# Patient Record
Sex: MALE | Race: WHITE | NOT HISPANIC OR LATINO | Employment: OTHER | URBAN - METROPOLITAN AREA
[De-identification: names, ages, dates, MRNs, and addresses within clinical notes are randomized per-mention and may not be internally consistent; named-entity substitution may affect disease eponyms.]

---

## 2023-06-01 ENCOUNTER — TELEPHONE (OUTPATIENT)
Dept: TRANSPLANT | Facility: CLINIC | Age: 58
End: 2023-06-01

## 2023-06-01 ENCOUNTER — TELEPHONE (OUTPATIENT)
Dept: TRANSPLANT | Facility: CLINIC | Age: 58
End: 2023-06-01
Payer: COMMERCIAL

## 2023-06-01 NOTE — LETTER
June 1, 2023    Grupo Gallegos  [address]  [address]  [place] [address]  Phone: [phone]  Fax: [phone]             Dear Dr. Grupo Gallegos:    Patient: Cornelio Rivers   MR Number: [id number]   YOB: 1965     Thank you for the referral of Cornelio Rivers to our transplant program. Your patients demographic and insurance information has been forwarded to our financial counselor for insurance clearance.  Once the insurance company has approved a transplant evaluation for your patient he will be contacted by the transplant coordinator.  An initial appointment will then be scheduled for your patient with one of our transplant hepatologists.      Thank you again for your trust in our program.  If there is anything we can do for you or your staff, please feel free to contact us.      Sincerely,        [facility]   [address] [place] [address]  [phone]

## 2023-06-01 NOTE — TELEPHONE ENCOUNTER
----- Message from Jatinder Rivers sent at 6/1/2023  1:03 PM CDT -----    Hepatology referral received and scanned into media; pt chart sent to referral nurse for medical review.       Referring Provider: Grupo Gallegos MD   Phone: [phone]   Fax: [phone]        .

## 2023-06-01 NOTE — LETTER
June 1, 2023    Cornelio Rivers  [address]  [place] [address]          Dear Cornelio Rivers:    Your doctor has referred you to the [facility] for liver transplant consideration.  Your demographic and insurance information have been forwarded to our financial counselor for insurance clearance.  You will be contacted by our office once your insurance company has approved a transplant consult and/or evaluation for you. An initial appointment will then be scheduled for you.     We look forward to seeing you soon. If you have any further questions, please contact us at [phone].       Sincerely,        [facility]   [address] [place] [address]  [phone]

## 2023-06-01 NOTE — TELEPHONE ENCOUNTER
"----- Message from Jatinder Rivers sent at 6/1/2023  1:47 PM CDT -----    Called pt x2 at [phone] (Mobile), but the VM belongs to a woman named "Charles." [facility] for him to call back to [facility] appt. Called pt at [phone], but that a company number for "[employer]" stating to call [phone], but there was no answer.     Called ref Md office at [phone] and [facility] for them to call back with better contact info for the pt.  .      "

## 2023-06-01 NOTE — TELEPHONE ENCOUNTER
Referral received from Dr Grupo Gallegos  Patient with Alcoholic cirrhosis. MELD 29  ICD-10: K74.60  Referred for liver transplant for EVALUATION.    Referral completed and forwarded to Transplant Financial Services.        Insurance:

## 2023-06-02 ENCOUNTER — TELEPHONE (OUTPATIENT)
Dept: TRANSPLANT | Facility: CLINIC | Age: 58
End: 2023-06-02
Payer: COMMERCIAL

## 2023-06-02 NOTE — TELEPHONE ENCOUNTER
----- Message from Jatinder Rivers sent at 6/2/2023  8:21 AM CDT -----  Regarding: FW: Appointment    Returned call to pt wife to get pt shaun an appt for next week, but she wanted to what a few weeks. She told me that the pt was d/c'ed form the [facility] at the end of April and was fine, but the last few weeks the pt has been very weak; low BP and sleeping more and hard to rouse. Told her that I will have a nurse give her a call back.    .  ----- Message -----  From: Princess Aquino  Sent: 6/2/2023   6:03 AM CDT  To: Jatinder Rivers  Subject: FW: Appointment                                    ----- Message -----  From: Amanda Sagastume  Sent: 6/1/2023   5:00 PM CDT  To: [facility] Non-Clinical  Subject: Appointment                                      Pt's wife is returning a call to Jatinder. In regards to scheduling an appt for the pt.      Rogers  (Wife) @ [phone]

## 2023-06-02 NOTE — TELEPHONE ENCOUNTER
Informed per  who spoke with patient's wife that patient is difficult to arouse and has low BP. Called and spoke with wife. She states he sleeps a lot and his BP is low. Advised wife to bring patient to ER and notify Dr. Gallegos's office. States patient is taking Lactulose and Rifaximin. Advised that patient sounds encephalopathic and letting him sleep is not going help him get better. He could go into a coma. Needs to be seen and assessed urgently.

## 2023-06-09 ENCOUNTER — TELEPHONE (OUTPATIENT)
Dept: TRANSPLANT | Facility: CLINIC | Age: 58
End: 2023-06-09
Payer: COMMERCIAL

## 2023-06-09 NOTE — TELEPHONE ENCOUNTER
Patient called to let him know that he was cleared for transplant evaluation and consult appointment.  There was no answer message left for the patient to call the office.

## 2023-06-09 NOTE — TELEPHONE ENCOUNTER
Patient is currently inpatient at [facility] for elevated ammonia level, pneumonia and sepsis.  Patient is no longer in  ICU.  Patient still has an elevated ammonia  level and is currently being treated to bring it down to normal level..   Patient remains extremely lethargic according to his wife.    Patient's wife instructed to call the office when he is discharged so the patient can be scheduled urgently in the clinic.

## 2023-06-12 ENCOUNTER — TELEPHONE (OUTPATIENT)
Dept: HEPATOLOGY | Facility: CLINIC | Age: 58
End: 2023-06-12
Payer: COMMERCIAL

## 2023-06-13 NOTE — TELEPHONE ENCOUNTER
Dr. Morrison called from ER via transfer center about this 57 yo alc hep pt, quit drinking 3 mon ago because he got too sick, slow recovery. Had appt last wk for transplant eval. INR 2.5, T. bili 13, mildly HE, creat 0.5, Na 142.  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     MELD 28 or 29.     May be a transplant candidate.

## 2023-06-13 NOTE — PROVIDER TRANSFER
(Physician in Lead of Transfers)  Outside Transfer Acceptance Note / Regional Referral Center    Upon patient arrival to floor, please send SecureChat to [facility] or call extension [phone] (if no answer, do NOT leave a callback number after the beep, rather please send a SecureChat to [facility]), for Hospital Medicine admit team assignment and for additional admit orders for the patient.  Do not page the attending physician associated with the patient on arrival (this physician may not be on duty at the time of arrival).  Rather, always send a SecureChat to [facility] or call [phone] to reach the triage physician for orders and team assignment.    Referring facility: [facility]   Referring provider: BLAS AGUDELO  Accepting facility: [facility]  Accepting provider: LURDES CASTILLO  Reason for transfer: Higher Level of Care   Transfer diagnosis: decompensated cirrhosis  Transfer specialty requested: Hepatology  Transfer specialty notified: Yes  Transfer level: NUMBER 1-5: 2  Bed type requested: stepdown  Isolation status: No active isolations   Admission class or status: IP- Inpatient      Narrative     58-year-old male with a history of cirrhosis with ascites and varices, SBP, alcohol use, hyperlipidemia, thrombocytopenia   admitted to [facility] on June 2 with altered mental status, increasing weakness, poor appetite, and possible pneumonia.  He had ammonia level 102 and a MELD score 32 (sodium 118, INR 2.2, total bilirubin 16.3, creatinine 1.02) chest x-ray on admission had mild interstitial edema versus pneumonitis with findings suggestive of developing airspace disease or atelectasis in the right chest.  Initially he had hypotension with concern for septic shock and was treated in the ICU with pressors.  He was treated with broad-spectrum antibiotics.  Hemoglobin decreased during his stay and he received packed red blood cells, but he did not  have signs of GI bleeding.  INR increased during his stay and he received vitamin K/FFP.  He gradually weaned off pressors and was transferred out of the ICU on June 8.  Mental status has not improved, and he remains intermittently lethargic.  Bilirubin was stable to slightly improved, but INR has worsened.  He was empirically started on Rocephin for SBP prophylaxis (no paracentesis with elevated INR//blood cultures with no growth so far).  He has persistent abdominal distention.  Current medications include Xifaxan and lactulose, Protonix, midodrine, ceftriaxone and Lasix/Aldactone.  Last alcohol use was noted to be about 2 months ago. Current MELD score 29.  Referring team spoke with Hepatology at [facility].  Requesting transfer to Hospital Medicine at [facility] for further treatment of decompensated cirrhosis.  Referring provider noted that the patient is stable.  He remains somnolent but will awake and answer questions.  He is able to tolerate oral medications.  He is not on any continuous IV infusions by report.    Current diagnoses include hepatic encephalopathy, alcoholic hepatitis, coagulopathy, and anemia.    June 13: Sodium 141, potassium 3.4, chloride 105 CO2 30, BUN 11, creatinine 0.51, glucose 103, total bilirubin 13.8, , ALT 75, INR 3, white blood cells 8.3, hemoglobin 8.4, hematocrit 25.5, platelets 89    June 12: Sodium 142, potassium 3, chloride 106, CO2 29, BUN 10, creatinine 0.59, glucose 125, calcium 9.5, magnesium 1.64, bilirubin 13.5, , ALT 78, white blood cells 7.4, hemoglobin 8.3, hematocrit 25.5, platelets 109, INR 2.9    June 9:  Right upper extremity Doppler venous ultrasound had no DVT  -chest x-ray had stable patchy bilateral pulmonary infiltrates.    June 2: COVID negative, influenza negative  -gallbladder ultrasound noted moderate volume ascites.  Thick-walled gallbladder seen, nonspecific.  Internal gallbladder sludge.  Chest x-ray had mild interstitial  edema or pneumonitis with findings suggestive of developing airspace disease or atelectasis in the right chest.  -CT head had no evidence of acute intracranial hemorrhage.  Some microvascular disease is present.    February 16, 2023: EGD had no significant recurrence of varices in the esophagus.  Moderate portal hypertensive gastropathy with friable mucosa.    Objective     Vitals:  Temperature 98.9°, pulse 81, blood pressure 100/62, O2 sats 96%    Instructions    Admit to Hospital Medicine  Consult Hepatology      JATINDER Hardy MD  Hospital Medicine Staff  Cell: [phone]

## 2023-06-15 ENCOUNTER — HOSPITAL ENCOUNTER (INPATIENT)
Facility: HOSPITAL | Age: 58
LOS: 13 days | Discharge: HOSPICE | DRG: 432 | End: 2023-06-28
Attending: INTERNAL MEDICINE | Admitting: INTERNAL MEDICINE
Payer: COMMERCIAL

## 2023-06-15 DIAGNOSIS — K72.90 DECOMPENSATED HEPATIC CIRRHOSIS: ICD-10-CM

## 2023-06-15 DIAGNOSIS — R06.02 SOB (SHORTNESS OF BREATH): ICD-10-CM

## 2023-06-15 DIAGNOSIS — K70.31 ALCOHOLIC CIRRHOSIS OF LIVER WITH ASCITES: ICD-10-CM

## 2023-06-15 DIAGNOSIS — Z03.89 RULED OUT FOR MYOCARDIAL INFARCTION: ICD-10-CM

## 2023-06-15 DIAGNOSIS — K74.60 DECOMPENSATED HEPATIC CIRRHOSIS: ICD-10-CM

## 2023-06-15 DIAGNOSIS — R07.9 CHEST PAIN: ICD-10-CM

## 2023-06-15 DIAGNOSIS — K76.82 HEPATIC ENCEPHALOPATHY: Primary | ICD-10-CM

## 2023-06-15 PROBLEM — E87.6 HYPOKALEMIA: Status: ACTIVE | Noted: 2023-06-15

## 2023-06-15 PROBLEM — R41.0 DELIRIUM: Status: ACTIVE | Noted: 2023-06-15

## 2023-06-15 PROBLEM — I85.10 SECONDARY ESOPHAGEAL VARICES WITHOUT BLEEDING: Status: ACTIVE | Noted: 2023-06-15

## 2023-06-15 PROBLEM — R53.81 PHYSICAL DEBILITY: Status: ACTIVE | Noted: 2023-06-15

## 2023-06-15 PROBLEM — E83.42 HYPOMAGNESEMIA: Status: ACTIVE | Noted: 2023-06-15

## 2023-06-15 PROBLEM — K76.6 PORTAL HYPERTENSION: Status: ACTIVE | Noted: 2023-06-15

## 2023-06-15 PROBLEM — D68.9 COAGULOPATHY: Status: ACTIVE | Noted: 2023-06-15

## 2023-06-15 PROBLEM — D69.6 THROMBOCYTOPENIA: Status: ACTIVE | Noted: 2023-06-15

## 2023-06-15 LAB
ABO + RH BLD: NORMAL
AFP SERPL-MCNC: <2 NG/ML (ref 0–8.4)
ALBUMIN FLD-MCNC: <0.4 G/DL
ALBUMIN SERPL BCP-MCNC: 2.4 G/DL (ref 3.5–5.2)
ALP SERPL-CCNC: 210 U/L (ref 55–135)
ALT SERPL W/O P-5'-P-CCNC: 71 U/L (ref 10–44)
AMMONIA PLAS-SCNC: 40 UMOL/L (ref 10–50)
AMPHET+METHAMPHET UR QL: NEGATIVE
ANION GAP SERPL CALC-SCNC: 11 MMOL/L (ref 8–16)
APPEARANCE FLD: CLEAR
AST SERPL-CCNC: 97 U/L (ref 10–40)
AV INDEX (PROSTH): 1.27
AV MEAN GRADIENT: 3 MMHG
AV PEAK GRADIENT: 4 MMHG
AV VALVE AREA: 6.65 CM2
AV VELOCITY RATIO: 1.21
BARBITURATES UR QL SCN>200 NG/ML: NEGATIVE
BASOPHILS # BLD AUTO: 0.06 K/UL (ref 0–0.2)
BASOPHILS NFR BLD: 0.6 % (ref 0–1.9)
BENZODIAZ UR QL SCN>200 NG/ML: NEGATIVE
BILIRUB SERPL-MCNC: 13.5 MG/DL (ref 0.1–1)
BLD GP AB SCN CELLS X3 SERPL QL: NORMAL
BNP SERPL-MCNC: 133 PG/ML (ref 0–99)
BODY FLD TYPE: NORMAL
BODY FLUID SOURCE, LDH: NORMAL
BSA FOR ECHO PROCEDURE: 1.92 M2
BUN SERPL-MCNC: 14 MG/DL (ref 6–20)
BZE UR QL SCN: NEGATIVE
CALCIUM SERPL-MCNC: 10 MG/DL (ref 8.7–10.5)
CANNABINOIDS UR QL SCN: ABNORMAL
CHLORIDE SERPL-SCNC: 100 MMOL/L (ref 95–110)
CO2 SERPL-SCNC: 29 MMOL/L (ref 23–29)
COLOR FLD: YELLOW
CREAT SERPL-MCNC: 0.6 MG/DL (ref 0.5–1.4)
CREAT UR-MCNC: 126 MG/DL (ref 23–375)
CV ECHO LV RWT: 0.31 CM
DIFFERENTIAL METHOD: ABNORMAL
DOP CALC AO PEAK VEL: 1.02 M/S
DOP CALC AO VTI: 19.79 CM
DOP CALC LVOT AREA: 5.2 CM2
DOP CALC LVOT DIAMETER: 2.58 CM
DOP CALC LVOT PEAK VEL: 1.23 M/S
DOP CALC LVOT STROKE VOLUME: 131.68 CM3
DOP CALCLVOT PEAK VEL VTI: 25.2 CM
E WAVE DECELERATION TIME: 218.75 MSEC
E/A RATIO: 1.31
E/E' RATIO: 9.63 M/S
ECHO LV POSTERIOR WALL: 0.84 CM (ref 0.6–1.1)
EJECTION FRACTION: 70 %
EOSINOPHIL # BLD AUTO: 0.2 K/UL (ref 0–0.5)
EOSINOPHIL NFR BLD: 1.9 % (ref 0–8)
ERYTHROCYTE [DISTWIDTH] IN BLOOD BY AUTOMATED COUNT: ABNORMAL % (ref 11.5–14.5)
EST. GFR  (NO RACE VARIABLE): >60 ML/MIN/1.73 M^2
ETHANOL UR-MCNC: <10 MG/DL
FERRITIN SERPL-MCNC: 1840 NG/ML (ref 20–300)
FIBRINOGEN PPP-MCNC: 93 MG/DL (ref 182–400)
FRACTIONAL SHORTENING: 35 % (ref 28–44)
GLUCOSE SERPL-MCNC: 121 MG/DL (ref 70–110)
GRAM STN SPEC: NORMAL
GRAM STN SPEC: NORMAL
HAV IGM SERPL QL IA: NORMAL
HBV CORE IGM SERPL QL IA: NORMAL
HBV SURFACE AG SERPL QL IA: NORMAL
HCT VFR BLD AUTO: 25.5 % (ref 40–54)
HCV AB SERPL QL IA: NORMAL
HGB BLD-MCNC: 8.3 G/DL (ref 14–18)
HIV 1+2 AB+HIV1 P24 AG SERPL QL IA: NORMAL
IMM GRANULOCYTES # BLD AUTO: 0.06 K/UL (ref 0–0.04)
IMM GRANULOCYTES NFR BLD AUTO: 0.6 % (ref 0–0.5)
INR PPP: 2.1 (ref 0.8–1.2)
INTERVENTRICULAR SEPTUM: 0.71 CM (ref 0.6–1.1)
LA MAJOR: 6.37 CM
LA MINOR: 4.97 CM
LA WIDTH: 4.49 CM
LDH FLD L TO P-CCNC: 39 U/L
LEFT ATRIUM SIZE: 3.77 CM
LEFT ATRIUM VOLUME INDEX MOD: 36.2 ML/M2
LEFT ATRIUM VOLUME INDEX: 41.8 ML/M2
LEFT ATRIUM VOLUME MOD: 69.47 CM3
LEFT ATRIUM VOLUME: 80.34 CM3
LEFT INTERNAL DIMENSION IN SYSTOLE: 3.49 CM (ref 2.1–4)
LEFT VENTRICLE DIASTOLIC VOLUME INDEX: 74.07 ML/M2
LEFT VENTRICLE DIASTOLIC VOLUME: 142.22 ML
LEFT VENTRICLE MASS INDEX: 78 G/M2
LEFT VENTRICLE SYSTOLIC VOLUME INDEX: 26.3 ML/M2
LEFT VENTRICLE SYSTOLIC VOLUME: 50.45 ML
LEFT VENTRICULAR INTERNAL DIMENSION IN DIASTOLE: 5.41 CM (ref 3.5–6)
LEFT VENTRICULAR MASS: 149.37 G
LV LATERAL E/E' RATIO: 8.56 M/S
LV SEPTAL E/E' RATIO: 11 M/S
LYMPHOCYTES # BLD AUTO: 1.4 K/UL (ref 1–4.8)
LYMPHOCYTES NFR BLD: 13.8 % (ref 18–48)
LYMPHOCYTES NFR FLD MANUAL: 28 %
MAGNESIUM SERPL-MCNC: 1.5 MG/DL (ref 1.6–2.6)
MCH RBC QN AUTO: 36.2 PG (ref 27–31)
MCHC RBC AUTO-ENTMCNC: 32.5 G/DL (ref 32–36)
MCV RBC AUTO: 111 FL (ref 82–98)
MESOTHL CELL NFR FLD MANUAL: 16 %
METHADONE UR QL SCN>300 NG/ML: NEGATIVE
MONOCYTES # BLD AUTO: 0.9 K/UL (ref 0.3–1)
MONOCYTES NFR BLD: 9 % (ref 4–15)
MONOS+MACROS NFR FLD MANUAL: 47 %
MV A" WAVE DURATION": 10.85 MSEC
MV PEAK A VEL: 0.59 M/S
MV PEAK E VEL: 0.77 M/S
MV STENOSIS PRESSURE HALF TIME: 63.44 MS
MV VALVE AREA P 1/2 METHOD: 3.47 CM2
NEUTROPHILS # BLD AUTO: 7.7 K/UL (ref 1.8–7.7)
NEUTROPHILS NFR BLD: 74.1 % (ref 38–73)
NEUTROPHILS NFR FLD MANUAL: 9 %
NRBC BLD-RTO: 0 /100 WBC
OPIATES UR QL SCN: NEGATIVE
PCP UR QL SCN>25 NG/ML: NEGATIVE
PHOSPHATE SERPL-MCNC: 3.2 MG/DL (ref 2.7–4.5)
PLATELET # BLD AUTO: 133 K/UL (ref 150–450)
PMV BLD AUTO: 9.7 FL (ref 9.2–12.9)
POTASSIUM SERPL-SCNC: 3.2 MMOL/L (ref 3.5–5.1)
PROCALCITONIN SERPL IA-MCNC: 0.11 NG/ML
PROT FLD-MCNC: <1 G/DL
PROT SERPL-MCNC: 6 G/DL (ref 6–8.4)
PROTHROMBIN TIME: 21.6 SEC (ref 9–12.5)
PULM VEIN S/D RATIO: 2.83
PV PEAK D VEL: 0.29 M/S
PV PEAK S VEL: 0.82 M/S
RBC # BLD AUTO: 2.29 M/UL (ref 4.6–6.2)
SINUS: 4.64 CM
SODIUM SERPL-SCNC: 140 MMOL/L (ref 136–145)
SODIUM UR-SCNC: 38 MMOL/L (ref 20–250)
SPECIMEN OUTDATE: NORMAL
SPECIMEN SOURCE: NORMAL
SPECIMEN SOURCE: NORMAL
STJ: 4.55 CM
TDI LATERAL: 0.09 M/S
TDI SEPTAL: 0.07 M/S
TDI: 0.08 M/S
TOXICOLOGY INFORMATION: ABNORMAL
TRICUSPID ANNULAR PLANE SYSTOLIC EXCURSION: 1.65 CM
WBC # BLD AUTO: 10.35 K/UL (ref 3.9–12.7)
WBC # FLD: 25 /CU MM

## 2023-06-15 PROCEDURE — 93010 ELECTROCARDIOGRAM REPORT: CPT | Mod: NTX,,, | Performed by: INTERNAL MEDICINE

## 2023-06-15 PROCEDURE — 83880 ASSAY OF NATRIURETIC PEPTIDE: CPT | Mod: NTX | Performed by: HOSPITALIST

## 2023-06-15 PROCEDURE — 84157 ASSAY OF PROTEIN OTHER: CPT | Mod: NTX | Performed by: HOSPITALIST

## 2023-06-15 PROCEDURE — 36415 COLL VENOUS BLD VENIPUNCTURE: CPT | Mod: NTX | Performed by: HOSPITALIST

## 2023-06-15 PROCEDURE — 85610 PROTHROMBIN TIME: CPT | Mod: NTX | Performed by: HOSPITALIST

## 2023-06-15 PROCEDURE — 82105 ALPHA-FETOPROTEIN SERUM: CPT | Mod: NTX | Performed by: HOSPITALIST

## 2023-06-15 PROCEDURE — 86900 BLOOD TYPING SEROLOGIC ABO: CPT | Mod: NTX | Performed by: HOSPITALIST

## 2023-06-15 PROCEDURE — 83615 LACTATE (LD) (LDH) ENZYME: CPT | Mod: NTX | Performed by: HOSPITALIST

## 2023-06-15 PROCEDURE — 99222 PR INITIAL HOSPITAL CARE,LEVL II: ICD-10-PCS | Mod: NTX,,, | Performed by: INTERNAL MEDICINE

## 2023-06-15 PROCEDURE — 84145 PROCALCITONIN (PCT): CPT | Mod: NTX | Performed by: HOSPITALIST

## 2023-06-15 PROCEDURE — 93010 EKG 12-LEAD: ICD-10-PCS | Mod: NTX,,, | Performed by: INTERNAL MEDICINE

## 2023-06-15 PROCEDURE — 87075 CULTR BACTERIA EXCEPT BLOOD: CPT | Mod: NTX | Performed by: HOSPITALIST

## 2023-06-15 PROCEDURE — 84100 ASSAY OF PHOSPHORUS: CPT | Mod: NTX | Performed by: HOSPITALIST

## 2023-06-15 PROCEDURE — 85384 FIBRINOGEN ACTIVITY: CPT | Mod: NTX | Performed by: HOSPITALIST

## 2023-06-15 PROCEDURE — 99223 PR INITIAL HOSPITAL CARE,LEVL III: ICD-10-PCS | Mod: NTX,,, | Performed by: HOSPITALIST

## 2023-06-15 PROCEDURE — 87205 SMEAR GRAM STAIN: CPT | Mod: NTX | Performed by: HOSPITALIST

## 2023-06-15 PROCEDURE — 82140 ASSAY OF AMMONIA: CPT | Mod: NTX | Performed by: HOSPITALIST

## 2023-06-15 PROCEDURE — 25000003 PHARM REV CODE 250: Mod: NTX | Performed by: HOSPITALIST

## 2023-06-15 PROCEDURE — 89051 BODY FLUID CELL COUNT: CPT | Mod: NTX | Performed by: HOSPITALIST

## 2023-06-15 PROCEDURE — 82728 ASSAY OF FERRITIN: CPT | Mod: NTX | Performed by: HOSPITALIST

## 2023-06-15 PROCEDURE — 80053 COMPREHEN METABOLIC PANEL: CPT | Mod: NTX | Performed by: HOSPITALIST

## 2023-06-15 PROCEDURE — 84300 ASSAY OF URINE SODIUM: CPT | Mod: NTX | Performed by: HOSPITALIST

## 2023-06-15 PROCEDURE — 85025 COMPLETE CBC W/AUTO DIFF WBC: CPT | Mod: NTX | Performed by: HOSPITALIST

## 2023-06-15 PROCEDURE — 99223 1ST HOSP IP/OBS HIGH 75: CPT | Mod: NTX,,, | Performed by: HOSPITALIST

## 2023-06-15 PROCEDURE — 80074 ACUTE HEPATITIS PANEL: CPT | Mod: NTX | Performed by: HOSPITALIST

## 2023-06-15 PROCEDURE — 97530 THERAPEUTIC ACTIVITIES: CPT | Mod: NTX

## 2023-06-15 PROCEDURE — 83735 ASSAY OF MAGNESIUM: CPT | Mod: NTX | Performed by: HOSPITALIST

## 2023-06-15 PROCEDURE — 82042 OTHER SOURCE ALBUMIN QUAN EA: CPT | Mod: NTX | Performed by: HOSPITALIST

## 2023-06-15 PROCEDURE — 63600175 PHARM REV CODE 636 W HCPCS: Mod: NTX | Performed by: HOSPITALIST

## 2023-06-15 PROCEDURE — 87389 HIV-1 AG W/HIV-1&-2 AB AG IA: CPT | Mod: NTX | Performed by: HOSPITALIST

## 2023-06-15 PROCEDURE — 80307 DRUG TEST PRSMV CHEM ANLYZR: CPT | Mod: NTX | Performed by: HOSPITALIST

## 2023-06-15 PROCEDURE — 97162 PT EVAL MOD COMPLEX 30 MIN: CPT | Mod: NTX

## 2023-06-15 PROCEDURE — 80321 ALCOHOLS BIOMARKERS 1OR 2: CPT | Mod: NTX | Performed by: HOSPITALIST

## 2023-06-15 PROCEDURE — 93005 ELECTROCARDIOGRAM TRACING: CPT | Mod: NTX

## 2023-06-15 PROCEDURE — 99222 1ST HOSP IP/OBS MODERATE 55: CPT | Mod: NTX,,, | Performed by: INTERNAL MEDICINE

## 2023-06-15 PROCEDURE — 25000003 PHARM REV CODE 250: Mod: NTX

## 2023-06-15 PROCEDURE — 87070 CULTURE OTHR SPECIMN AEROBIC: CPT | Mod: NTX | Performed by: HOSPITALIST

## 2023-06-15 PROCEDURE — 20600001 HC STEP DOWN PRIVATE ROOM: Mod: NTX

## 2023-06-15 RX ORDER — DEXTROSE 40 %
15 GEL (GRAM) ORAL
Status: DISCONTINUED | OUTPATIENT
Start: 2023-06-15 | End: 2023-06-28 | Stop reason: HOSPADM

## 2023-06-15 RX ORDER — GLUCAGON 1 MG
1 KIT INJECTION
Status: DISCONTINUED | OUTPATIENT
Start: 2023-06-15 | End: 2023-06-28 | Stop reason: HOSPADM

## 2023-06-15 RX ORDER — SODIUM CHLORIDE 0.9 % (FLUSH) 0.9 %
10 SYRINGE (ML) INJECTION EVERY 12 HOURS PRN
Status: DISCONTINUED | OUTPATIENT
Start: 2023-06-15 | End: 2023-06-28 | Stop reason: HOSPADM

## 2023-06-15 RX ORDER — ACETAMINOPHEN 500 MG
500 TABLET ORAL EVERY 6 HOURS PRN
Status: DISCONTINUED | OUTPATIENT
Start: 2023-06-15 | End: 2023-06-28 | Stop reason: HOSPADM

## 2023-06-15 RX ORDER — IPRATROPIUM BROMIDE AND ALBUTEROL SULFATE 2.5; .5 MG/3ML; MG/3ML
3 SOLUTION RESPIRATORY (INHALATION) EVERY 4 HOURS PRN
Status: DISCONTINUED | OUTPATIENT
Start: 2023-06-15 | End: 2023-06-28 | Stop reason: HOSPADM

## 2023-06-15 RX ORDER — THIAMINE HCL 100 MG
100 TABLET ORAL DAILY
Status: DISCONTINUED | OUTPATIENT
Start: 2023-06-15 | End: 2023-06-15

## 2023-06-15 RX ORDER — POTASSIUM CHLORIDE 7.45 MG/ML
10 INJECTION INTRAVENOUS
Status: COMPLETED | OUTPATIENT
Start: 2023-06-15 | End: 2023-06-15

## 2023-06-15 RX ORDER — MIDODRINE HYDROCHLORIDE 5 MG/1
10 TABLET ORAL
Status: DISCONTINUED | OUTPATIENT
Start: 2023-06-15 | End: 2023-06-19

## 2023-06-15 RX ORDER — NADOLOL 40 MG/1
40 TABLET ORAL DAILY
Status: ON HOLD | COMMUNITY
Start: 2023-04-26 | End: 2023-06-28 | Stop reason: HOSPADM

## 2023-06-15 RX ORDER — RIFAXIMIN 550 MG/1
550 TABLET ORAL 2 TIMES DAILY
Status: ON HOLD | COMMUNITY
Start: 2023-05-29 | End: 2023-06-28 | Stop reason: SDUPTHER

## 2023-06-15 RX ORDER — SPIRONOLACTONE 50 MG/1
100 TABLET, FILM COATED ORAL DAILY
Status: DISCONTINUED | OUTPATIENT
Start: 2023-06-15 | End: 2023-06-21

## 2023-06-15 RX ORDER — FUROSEMIDE 40 MG/1
40 TABLET ORAL DAILY
Status: DISCONTINUED | OUTPATIENT
Start: 2023-06-15 | End: 2023-06-15

## 2023-06-15 RX ORDER — LACTULOSE 10 G/15ML
SOLUTION ORAL
Status: ON HOLD | COMMUNITY
End: 2023-06-28 | Stop reason: HOSPADM

## 2023-06-15 RX ORDER — DEXTROSE 40 %
30 GEL (GRAM) ORAL
Status: DISCONTINUED | OUTPATIENT
Start: 2023-06-15 | End: 2023-06-28 | Stop reason: HOSPADM

## 2023-06-15 RX ORDER — LACTULOSE 10 G/15ML
20 SOLUTION ORAL 3 TIMES DAILY
Status: DISCONTINUED | OUTPATIENT
Start: 2023-06-15 | End: 2023-06-17

## 2023-06-15 RX ORDER — PANTOPRAZOLE SODIUM 40 MG/1
40 TABLET, DELAYED RELEASE ORAL DAILY
Status: ON HOLD | COMMUNITY
Start: 2023-05-29 | End: 2023-06-28 | Stop reason: HOSPADM

## 2023-06-15 RX ORDER — TALC
6 POWDER (GRAM) TOPICAL NIGHTLY PRN
Status: DISCONTINUED | OUTPATIENT
Start: 2023-06-15 | End: 2023-06-28 | Stop reason: HOSPADM

## 2023-06-15 RX ORDER — CIPROFLOXACIN 500 MG/1
500 TABLET ORAL DAILY
Status: ON HOLD | COMMUNITY
Start: 2023-05-04 | End: 2023-06-28 | Stop reason: SDUPTHER

## 2023-06-15 RX ORDER — FUROSEMIDE 10 MG/ML
40 INJECTION INTRAMUSCULAR; INTRAVENOUS DAILY
Status: DISCONTINUED | OUTPATIENT
Start: 2023-06-16 | End: 2023-06-16

## 2023-06-15 RX ORDER — PANTOPRAZOLE SODIUM 40 MG/1
40 TABLET, DELAYED RELEASE ORAL DAILY
Status: DISCONTINUED | OUTPATIENT
Start: 2023-06-15 | End: 2023-06-22

## 2023-06-15 RX ORDER — LIDOCAINE HYDROCHLORIDE 10 MG/ML
INJECTION INFILTRATION; PERINEURAL
Status: COMPLETED | OUTPATIENT
Start: 2023-06-15 | End: 2023-06-15

## 2023-06-15 RX ORDER — MAGNESIUM SULFATE HEPTAHYDRATE 40 MG/ML
2 INJECTION, SOLUTION INTRAVENOUS ONCE
Status: COMPLETED | OUTPATIENT
Start: 2023-06-15 | End: 2023-06-15

## 2023-06-15 RX ORDER — MIRTAZAPINE 7.5 MG/1
7.5 TABLET, FILM COATED ORAL NIGHTLY
Status: ON HOLD | COMMUNITY
Start: 2023-05-05 | End: 2023-06-28 | Stop reason: HOSPADM

## 2023-06-15 RX ORDER — FOLIC ACID 1 MG/1
1 TABLET ORAL DAILY
Status: DISCONTINUED | OUTPATIENT
Start: 2023-06-15 | End: 2023-06-15

## 2023-06-15 RX ORDER — SPIRONOLACTONE 100 MG/1
100 TABLET, FILM COATED ORAL DAILY
Status: ON HOLD | COMMUNITY
Start: 2023-05-22 | End: 2023-06-28 | Stop reason: SDUPTHER

## 2023-06-15 RX ORDER — FUROSEMIDE 10 MG/ML
40 INJECTION INTRAMUSCULAR; INTRAVENOUS DAILY
Status: DISCONTINUED | OUTPATIENT
Start: 2023-06-15 | End: 2023-06-15

## 2023-06-15 RX ORDER — NALOXONE HCL 0.4 MG/ML
0.02 VIAL (ML) INJECTION
Status: DISCONTINUED | OUTPATIENT
Start: 2023-06-15 | End: 2023-06-28 | Stop reason: HOSPADM

## 2023-06-15 RX ORDER — LACTULOSE 10 G/15ML
200 SOLUTION ORAL; RECTAL 3 TIMES DAILY
Status: DISCONTINUED | OUTPATIENT
Start: 2023-06-15 | End: 2023-06-15

## 2023-06-15 RX ORDER — ONDANSETRON 2 MG/ML
4 INJECTION INTRAMUSCULAR; INTRAVENOUS EVERY 8 HOURS PRN
Status: DISCONTINUED | OUTPATIENT
Start: 2023-06-15 | End: 2023-06-28 | Stop reason: HOSPADM

## 2023-06-15 RX ORDER — MECLIZINE HYDROCHLORIDE 25 MG/1
25 TABLET ORAL DAILY PRN
Status: ON HOLD | COMMUNITY
Start: 2023-04-19 | End: 2023-06-28 | Stop reason: HOSPADM

## 2023-06-15 RX ORDER — FUROSEMIDE 40 MG/1
40 TABLET ORAL 2 TIMES DAILY
Status: ON HOLD | COMMUNITY
Start: 2023-05-29 | End: 2023-06-28 | Stop reason: SDUPTHER

## 2023-06-15 RX ADMIN — POTASSIUM CHLORIDE 10 MEQ: 7.46 INJECTION, SOLUTION INTRAVENOUS at 06:06

## 2023-06-15 RX ADMIN — FOLIC ACID 1 MG: 1 TABLET ORAL at 09:06

## 2023-06-15 RX ADMIN — PHYTONADIONE 5 MG: 10 INJECTION, EMULSION INTRAMUSCULAR; INTRAVENOUS; SUBCUTANEOUS at 09:06

## 2023-06-15 RX ADMIN — PHYTONADIONE 10 MG: 10 INJECTION, EMULSION INTRAMUSCULAR; INTRAVENOUS; SUBCUTANEOUS at 11:06

## 2023-06-15 RX ADMIN — MIDODRINE HYDROCHLORIDE 10 MG: 5 TABLET ORAL at 05:06

## 2023-06-15 RX ADMIN — LACTULOSE 20 G: 20 SOLUTION ORAL at 09:06

## 2023-06-15 RX ADMIN — RIFAXIMIN 550 MG: 550 TABLET ORAL at 09:06

## 2023-06-15 RX ADMIN — PANTOPRAZOLE SODIUM 40 MG: 40 TABLET, DELAYED RELEASE ORAL at 09:06

## 2023-06-15 RX ADMIN — POTASSIUM CHLORIDE 10 MEQ: 7.46 INJECTION, SOLUTION INTRAVENOUS at 05:06

## 2023-06-15 RX ADMIN — Medication 100 MG: at 09:06

## 2023-06-15 RX ADMIN — MIDODRINE HYDROCHLORIDE 10 MG: 5 TABLET ORAL at 07:06

## 2023-06-15 RX ADMIN — CEFTRIAXONE 1 G: 1 INJECTION, POWDER, FOR SOLUTION INTRAMUSCULAR; INTRAVENOUS at 03:06

## 2023-06-15 RX ADMIN — MAGNESIUM SULFATE 2 G: 2 INJECTION INTRAVENOUS at 05:06

## 2023-06-15 RX ADMIN — SPIRONOLACTONE 100 MG: 50 TABLET ORAL at 09:06

## 2023-06-15 RX ADMIN — LACTULOSE 20 G: 20 SOLUTION ORAL at 03:06

## 2023-06-15 RX ADMIN — LIDOCAINE HYDROCHLORIDE 10 ML: 10 INJECTION, SOLUTION INFILTRATION; PERINEURAL at 12:06

## 2023-06-15 RX ADMIN — FUROSEMIDE 40 MG: 40 TABLET ORAL at 09:06

## 2023-06-15 NOTE — PT/OT/SLP EVAL
"Physical Therapy Evaluation and Treatment    Patient Name:  Cornelio Rivers   MRN:  [id number]    Recommendations:     Discharge Recommendations: nursing facility, skilled   Discharge Equipment Recommendations: hospital bed, lift device, wheelchair   Barriers to discharge: Increased level of assist    Assessment:     Cornelio Rivers is a 58 y.o. male admitted with a medical diagnosis of Decompensated hepatic cirrhosis. He presents with the following impairments/functional limitations: weakness, impaired endurance, impaired self care skills, impaired functional mobility, gait instability, impaired balance, decreased lower extremity function. Patient appears to attempt to follow commands minimally with significant delay. Attempted sit to stand however no activation noted.    Rehab Prognosis: Fair; patient would benefit from acute skilled PT services 3 x/week to address these deficits and reach maximum level of function.  Recent Surgery: * No surgery found *      Plan:     During this hospitalization, patient to be seen 3 x/week to address the identified rehab impairments via gait training, therapeutic activities, therapeutic exercises, neuromuscular re-education and progress toward the following goals:    Plan of Care Expires:  07/15/23    Subjective     Chief Complaint: Indicated pain with seated forward flexion, stated "burning" while sitting edge of bed  Patient/Family Comments/Goals: Pt's wife became tearful at end of session  Pain/Comfort:  Pain Rating 1:  (unable to assess, indicates pain with seated forward flexion, unable to rate or localize)    Patients cultural, spiritual, Holiness conflicts given the current situation: no    Living Environment: Per pt's wife  Living Environment: Patient lives with their spouse in a mobile home. Plans to discharge to wife's sister's house, single story home with threshold step to enter, tub-shower combo.  Prior Level of Function: Prior to admission, patient was independent and not " working and able to drive but hasn't been . Pt's wife reports he was independent prior to 2 weeks ago and then has required total assistance for ADLs since. He was ambulating independently prior to that and began using a rolling walker (~30 ft) for ambulation in the weeks leading to hospital admission and then wasn't getting out of bed the last couple of days prior to admission.   Equipment Used at Home: walker, rolling.  DME owned (not currently used): none  Assistance Upon Discharge: significant other and family (spouse is able to work from home sometimes and her sister can be there on the other days)    Objective:     Communicated with nursing prior to session. Patient found HOB elevated with telemetry, peripheral IV, Condom Catheter, oxygen upon PT entry to room.    General Precautions: Standard, fall  Orthopedic Precautions:N/A    Braces: N/A    Exams:  Cognitive Exam:  Patient is oriented to unable to assess, patient non-verbal, follows commands <10% of the time with significant delay and minimal movement (when asked to stick his tongue out, he opened his mouth slightly)  RLE ROM: WFL except lacking ~30 degrees knee extension in short sitting  RLE Strength: 1/5 dorsiflexion and knee extension, 0/5 otherwise  LLE ROM: WFL except lacking ~30 degrees knee extension in short sitting  LLE Strength: 1/5 dorsiflexion, 0/5 otherwise  Sensation: unable to assess    Functional Mobility:  Bed Mobility:    Rolling Right: total assistance  Supine to Sit: total assistance  Sit to Supine: total assistance  Transfers: attempted sit to stand with total assist, unable to clear hips, no initiation noted  Balance:   Static Sitting: Poor, able to maintain for 8 minute(s) with maximal - total assistance  Dynamic Sitting: Poor: Patient unable to accept challenge or move without loss of balance, total assistance    Therapeutic Activities and Exercises:  Patient educated on role of acute care PT and PT POC  Patient is not clear to  transfer with RN/PCT  Educated pt's spouse about pressure relief boots    AM-PAC 6 CLICK MOBILITY  Total Score:6     Patient left HOB elevated with all lines intact, call button in reach, RN notified, and spouse present.    GOALS:   Multidisciplinary Problems       Physical Therapy Goals          Problem: Physical Therapy    Goal Priority Disciplines Outcome Goal Variances Interventions   Physical Therapy Goal     PT, PT/OT Ongoing, Progressing     Description: Goals to be met by: 2023     Patient will increase functional independence with mobility by performin. Supine to sit with moderate assistance  2. Sit to supine with moderate assistance  3. Sit to stand transfer with maximal assistance  4. Bed to chair transfer with maximal assistance using LRAD as needed  5. Gait  x 10 feet with maximal assistance using LRAD as needed  6. Lower extremity exercise program x10 reps per handout, with supervision                        History:     Past Medical History:   Diagnosis Date    Alcoholic cirrhosis of liver with ascites     Ascites     Coagulopathy     Esophageal varices with bleeding     Hepatic encephalopathy     Mixed hyperlipidemia     Portal hypertension     Thrombocytopenia, unspecified        Past Surgical History:   Procedure Laterality Date    BICEPS TENDON REPAIR Right     femur facture Right     HERNIA REPAIR Bilateral        Time Tracking:     PT Received On: 06/15/23  PT Start Time: 935     PT Stop Time: 955  PT Total Time (min): 20 min     Billable Minutes: Evaluation 10 min Therapeutic Activity 8 min      06/15/2023

## 2023-06-15 NOTE — PROGRESS NOTES
"[facility] - Intensive Care ([facility]  Adult Nutrition  Consult Note    SUMMARY     Recommendations    1) Continue low sodium diet  2) If intake <50% add Boost Plus TID   3) Following    Goals: meet % een/epn by next Rd f/u  Nutrition Goal Status: new  Communication of RD Recs: other (comment) (POC)    Assessment and Plan    Nutrition Problem  Increased nutrient needs    Related to (etiology):   Physiological condition    Signs and Symptoms (as evidenced by):   Decompensated hepatic cirrhosis    Interventions/Recommendations (treatment strategy):  Collaboration with other providers    Nutrition Diagnosis Status:   Continues     Reason for Assessment    Reason For Assessment: consult  Diagnosis: liver disease  Relevant Medical History: Alcoholic cirrhosis of liver with ascites and portal HTN, HLD  Interdisciplinary Rounds: did not attend  General Information Comments: RD consulted for low appetite and wt loss. Unable to speak to pt x2 attempts. D/T radiology at 1 attempts and CT scan at second attempt. Saw pt be wheeled off of floor, appears thin. Wt hx per chart review is in conclusive with wt loss.  Following    Nutrition Discharge Planning: pending medical course    Nutrition Risk Screen    Nutrition Risk Screen: unintentional loss of 10 lbs or more in the past 2 months    Nutrition/Diet History    Spiritual, Cultural Beliefs, Adventism Practices, Values that Affect Care: no    Anthropometrics    Temp: 97.6 °F (36.4 °C)  Height: 5' 10" (177.8 cm)  Height (inches): 70 in  Weight Method: Bed Scale  Weight: 74.4 kg (164 lb)  Weight (lb): 164 lb  Ideal Body Weight (IBW), Male: 166 lb  % Ideal Body Weight, Male (lb): 98.8 %  BMI (Calculated): 23.5  BMI Grade: 18.5-24.9 - normal       Lab/Procedures/Meds    Pertinent Labs Reviewed: reviewed  Pertinent Labs Comments: H/H: 8.3/25.5, Potassium: 3.2, Glu:121, Ma.5, AST: 97, ALT: 71  Pertinent Medications Reviewed: reviewed  Pertinent Medications Comments: " lactulose, ABX, pantoprazole, thiamine      Estimated/Assessed Needs    Weight Used For Calorie Calculations: 74.4 kg (164 lb)  Energy Calorie Requirements (kcal): 1962 (MSJ * 1.25)  Energy Need Method: [place]  Protein Requirements:  (1.3-1.6 g/kg)  Weight Used For Protein Calculations: 74.4 kg (164 lb)     Estimated Fluid Requirement Method: RDA Method  RDA Method (mL): 1962         Nutrition Prescription Ordered    Current Diet Order: Low Sodium, 2gm    Evaluation of Received Nutrient/Fluid Intake    I/O: -360 ml since admit  Comments: LBM 6/15  % Intake of Estimated Energy Needs: Other: no intake recorded   % Meal Intake: Other: no intake recorded     Nutrition Risk    Level of Risk/Frequency of Follow-up: low - moderate (f/u 1x/week)       Monitor and Evaluation    Food and Nutrient Intake: food and beverage intake, energy intake  Food and Nutrient Adminstration: diet order  Knowledge/Beliefs/Attitudes: food and nutrition knowledge/skill, beliefs and attitudes  Physical Activity and Function: nutrition-related ADLs and IADLs  Anthropometric Measurements: height/length, weight, weight change, body mass index  Biochemical Data, Medical Tests and Procedures: gastrointestinal profile, electrolyte and renal panel, glucose/endocrine profile, inflammatory profile, lipid profile  Nutrition-Focused Physical Findings: overall appearance       Nutrition Follow-Up    RD Follow-up?: Yes    Abby Duff, Registration Eligible, Provisional LDN

## 2023-06-15 NOTE — PLAN OF CARE
Recommendations    1) Continue low sodium diet  2) If intake <50% add Boost Plus TID   3) Following    Goals: meet % een/epn by next Rd f/u  Nutrition Goal Status: new  Communication of RD Recs: other (comment) (POC)

## 2023-06-15 NOTE — PLAN OF CARE
Pt arrived to mpu room 3 for para, no acute distress noted. Orders and labs reviewed on chart. Awaiting consent.  Family at bedside.

## 2023-06-15 NOTE — ASSESSMENT & PLAN NOTE
-see above   -cut down alcohol in 2020 when diagnosed with esophageal varices and cirrhosis   -last alcohol use 2-3 months ago per wife   -check PETH   -consider addiction psychiatry consult

## 2023-06-15 NOTE — PLAN OF CARE
"End of shift Report  Patient arrived to hospital slightly lethargic but easily arousal with confusion noted.   Vitals sign stable on 3L bnc. Juandice noted to eyes and whole body.  Patient arrived with a left A/C I.V. RN informed on call provider at bedside of I.V who was okay with keeping it in.  RN performed dressing change to I.V blood return noted.   Blood work performed, chest xray, ultrasound of liver performed. Electrolytes replaced.   Pending Urine culture collection.   Wife at bedside    Blood pressure 110/69, pulse 99, temperature 97.9 °F (36.6 °C), temperature source Axillary, resp. rate 20, height 5' 10" (1.778 m), weight 74.8 kg (164 lb 14.5 oz), SpO2 95 %.\    Problem: Gas Exchange Impaired  Goal: Optimal Gas Exchange  Outcome: Ongoing, Progressing     Problem: Fall Injury Risk  Goal: Absence of Fall and Fall-Related Injury  Outcome: Ongoing, Progressing     Problem: Adult Inpatient Plan of Care  Goal: Plan of Care Review  Outcome: Ongoing, Progressing     "

## 2023-06-15 NOTE — ASSESSMENT & PLAN NOTE
-had initial episode of bleeding in 2020 treated with banding   -no further bleeding episode since then per wife   -follows with local GI. Dr. Gallegos   -last EGD in February 2023 showed no bleeding from varices, but had portal hypertensive gastropathy   -continue protonix daily

## 2023-06-15 NOTE — H&P
Inpatient Radiology Pre-procedure Note    History of Present Illness:  Cornelio Rivers is a 58 y.o. male who presents for paracentesis.  Admission H&P reviewed.  Past Medical History:   Diagnosis Date    Alcoholic cirrhosis of liver with ascites     Ascites     Coagulopathy     Esophageal varices with bleeding     Hepatic encephalopathy     Mixed hyperlipidemia     Portal hypertension     Thrombocytopenia, unspecified      Past Surgical History:   Procedure Laterality Date    BICEPS TENDON REPAIR Right     femur facture Right     HERNIA REPAIR Bilateral        Review of Systems:   As documented in primary team H&P    Home Meds:   Prior to Admission medications    Medication Sig Start Date End Date Taking? Authorizing Provider   ciprofloxacin HCl (CIPRO) 500 MG tablet Take 500 mg by mouth Daily. 5/4/23  Yes Historical Provider   furosemide (LASIX) 40 MG tablet Take 40 mg by mouth 2 (two) times daily. 5/29/23  Yes Historical Provider   lactulose (CHRONULAC) 20 gram/30 mL Soln 15 ml   Yes Historical Provider   mirtazapine (REMERON) 7.5 MG Tab Take 7.5 mg by mouth every evening. 5/5/23  Yes Historical Provider   nadoloL (CORGARD) 40 MG tablet Take 40 mg by mouth once daily. 4/26/23  Yes Historical Provider   pantoprazole (PROTONIX) 40 MG tablet Take 40 mg by mouth once daily. 5/29/23  Yes Historical Provider   spironolactone (ALDACTONE) 100 MG tablet Take 100 mg by mouth once daily. 5/22/23  Yes Historical Provider   XIFAXAN 550 mg Tab Take 550 mg by mouth 2 (two) times daily. 5/29/23  Yes Historical Provider   meclizine (ANTIVERT) 25 mg tablet Take 25 mg by mouth daily as needed. 4/19/23   Historical Provider     Scheduled Meds:    cefTRIAXone (ROCEPHIN) IVPB  1 g Intravenous Q24H    [START ON 6/16/2023] furosemide (LASIX) injection  40 mg Intravenous Daily    lactulose  200 g Rectal TID    lactulose  20 g Oral TID    midodrine  10 mg Oral TID WM    pantoprazole  40 mg Oral Daily    phytonadione ((AQUA-MEPHYTON) IVPB  10  mg Intravenous Daily    rifAXImin  550 mg Oral BID    spironolactone  100 mg Oral Daily    [START ON 6/16/2023] thiamine (VITAMIN B1) IVPB  100 mg Intravenous Daily     Continuous Infusions:   PRN Meds:acetaminophen, albuterol-ipratropium, dextrose 10%, dextrose 10%, dextrose, dextrose, glucagon (human recombinant), LIDOcaine HCL 10 mg/ml (1%), melatonin, naloxone, ondansetron, sodium chloride 0.9%  Anticoagulants/Antiplatelets: no anticoagulation    Allergies: Review of patient's allergies indicates:  No Known Allergies  Sedation Hx: have not been any systemic reactions    Labs:  Recent Labs   Lab 06/15/23  0056   INR 2.1*       Recent Labs   Lab 06/15/23  0056   WBC 10.35   HGB 8.3*   HCT 25.5*   *   *      Recent Labs   Lab 06/15/23  0056   *      K 3.2*      CO2 29   BUN 14   CREATININE 0.6   CALCIUM 10.0   MG 1.5*   ALT 71*   AST 97*   ALBUMIN 2.4*   BILITOT 13.5*         Vitals:  Temp: 97.6 °F (36.4 °C) (06/15/23 0737)  Pulse: 87 (06/15/23 1040)  Resp: 16 (06/15/23 0737)  BP: 113/67 (06/15/23 0737)  SpO2: 95 % (06/15/23 1040)     Physical Exam:  ASA: 3    General: no acute distress  Mental Status: confused  HEENT: normocephalic, atraumatic  Chest: unlabored breathing  Heart: regular heart rate  Abdomen: distended    Plan: imaging guided paracentesis  Sedation Plan: local anesthesia    Brianne Song MD PhD  Radiology Resident    Patient is 65 year old female admitted with history of MI, LBBB, HLD, PUD, CAD, asthma, presents with nausea, vomiting and loose stools.

## 2023-06-15 NOTE — HPI
Cornelio Rivers is a 58-year-old male with a history of alcoholic cirrhosis diagnosed in 2020 complicated by portal hypertension, bleeding esophageal varices s/p banding in 2020, ascites, thrombocytopenia, coagulopathy, hepatic encephalopathy, SBP, alcohol use, and hyperlipidemia who presents as a transfer from [facility] for management of decompensated liver cirrhosis. Patient was admitted to [facility] on June 2 with altered mental status, increasing weakness, low blood pressure, poor appetite, and possible pneumonia.  He had ammonia level 102 and a MELD score 32 (sodium 118, INR 2.2, total bilirubin 16.3, creatinine 1.02). Chest x-ray on admission had mild interstitial edema versus pneumonitis with findings suggestive of developing airspace disease or atelectasis in the right chest.  Initially he had hypotension with concern for septic shock and was treated in the ICU with pressors.  He was treated with broad-spectrum antibiotics.  Hemoglobin decreased during his stay and he received packed red blood cells, but he did not have signs of GI bleeding.  INR increased during his stay and he received vitamin K/FFP.  He gradually weaned off pressors and was transferred out of the ICU on June 8.  Mental status has not improved, and he remains intermittently lethargic.  Bilirubin was stable to slightly improved, but INR has worsened.  He was empirically started on Rocephin for SBP prophylaxis (no paracentesis with elevated INR//blood cultures with no growth so far).  He has persistent abdominal distention.  Current medications include Xifaxan and lactulose, Protonix, midodrine, ceftriaxone and Lasix/Aldactone.  Last alcohol use was noted to be about 2 months ago. Current MELD score 29.  Referring team spoke with Hepatology at [facility] with plan to transfer for further evaluation.   Current diagnoses include hepatic encephalopathy, alcoholic hepatitis, coagulopathy, and anemia.     June  13: Sodium 141, potassium 3.4, chloride 105 CO2 30, BUN 11, creatinine 0.51, glucose 103, total bilirubin 13.8, , ALT 75, INR 3, white blood cells 8.3, hemoglobin 8.4, hematocrit 25.5, platelets 89     June 12: Sodium 142, potassium 3, chloride 106, CO2 29, BUN 10, creatinine 0.59, glucose 125, calcium 9.5, magnesium 1.64, bilirubin 13.5, , ALT 78, white blood cells 7.4, hemoglobin 8.3, hematocrit 25.5, platelets 109, INR 2.9     June 9:  Right upper extremity Doppler venous ultrasound had no DVT  -chest x-ray had stable patchy bilateral pulmonary infiltrates.     June 2: COVID negative, influenza negative  -gallbladder ultrasound noted moderate volume ascites.  Thick-walled gallbladder seen, nonspecific.  Internal gallbladder sludge.  Chest x-ray had mild interstitial edema or pneumonitis with findings suggestive of developing airspace disease or atelectasis in the right chest.  -CT head had no evidence of acute intracranial hemorrhage.  Some microvascular disease is present.     February 16, 2023: EGD had no significant recurrence of varices in the esophagus.  Moderate portal hypertensive gastropathy with friable mucosa.    During my interview upon arrival to [facility], patient with waxing and waning mental status. He open eyes upon calling name, speaks few words but then falls back to sleep. He is oriented to person only as he was able to tell his name and his wife's name correctly who is present at bedside. He is able to follow simple commands like open his mouth upon asking. Wife states patient had a long history of alcohol use prior to 2020 when he developed acute esophageal variceal bleeding and diagnosed with alcoholic liver cirrhosis. He has been following with local GI doctor Dr. Gallegos in [place] for cirrhosis. His last hospital admission was in January of this year when he was admitted with SBP and had 5 liter paracentesis. He was discharged home on course of prednisone and ciprofloxacin at  that time. Wife states patient cut down alcohol since 2020 and his last alcohol use about 2-3 months ago. Wife noticed overall declining about 2 weeks prior to this hospital admission as he was getting more weak, confused, lethargy, losing weight and muscle mass, decreased appetite. On June 2nd wife noticed his blood pressure to be low in 70s when she drove him to [facility]. Denies tobacco, IVDU or other illicit drug use. He worked as a .

## 2023-06-15 NOTE — PLAN OF CARE
PT eval complete, plan of care established    6/15/2023    Problem: Physical Therapy  Goal: Physical Therapy Goal  Description: Goals to be met by: 2023     Patient will increase functional independence with mobility by performin. Supine to sit with moderate assistance  2. Sit to supine with moderate assistance  3. Sit to stand transfer with maximal assistance  4. Bed to chair transfer with maximal assistance using LRAD as needed  5. Gait  x 10 feet with maximal assistance using LRAD as needed  6. Lower extremity exercise program x10 reps per handout, with supervision   Outcome: Ongoing, Progressing

## 2023-06-15 NOTE — ASSESSMENT & PLAN NOTE
-improved with lactulose and rifaximin   -ammonia improved from 102 to 40  -titrate lactulose to 3-4 BMs/day

## 2023-06-15 NOTE — ASSESSMENT & PLAN NOTE
-waxing and waning mental status   -due to decomp liver cirrhosis and prolonged hospital stay   -delirium precautions

## 2023-06-15 NOTE — H&P
[facility] - Intensive Care ([address] Medicine  History & Physical    Patient Name: Cornelio Rivers  MRN: [id number]  Patient Class: IP- Inpatient  Admission Date: 6/15/2023  Attending Physician: Alexsandra Menendez MD   Primary Care Provider: Primary Doctor No         Patient information was obtained from spouse/SO and outside records, wife at bedside .     Subjective:     Principal Problem:Decompensated hepatic cirrhosis    Chief Complaint: weakness        HPI: Cornelio Rivers is a 58-year-old male with a history of alcoholic cirrhosis diagnosed in 2020 complicated by portal hypertension, bleeding esophageal varices s/p banding in 2020, ascites, thrombocytopenia, coagulopathy, hepatic encephalopathy, SBP, alcohol use, and hyperlipidemia who presents as a transfer from [facility] for management of decompensated liver cirrhosis. Patient was admitted to [facility] on June 2 with altered mental status, increasing weakness, low blood pressure, poor appetite, and possible pneumonia.  He had ammonia level 102 and a MELD score 32 (sodium 118, INR 2.2, total bilirubin 16.3, creatinine 1.02). Chest x-ray on admission had mild interstitial edema versus pneumonitis with findings suggestive of developing airspace disease or atelectasis in the right chest.  Initially he had hypotension with concern for septic shock and was treated in the ICU with pressors.  He was treated with broad-spectrum antibiotics.  Hemoglobin decreased during his stay and he received packed red blood cells, but he did not have signs of GI bleeding.  INR increased during his stay and he received vitamin K/FFP.  He gradually weaned off pressors and was transferred out of the ICU on June 8.  Mental status has not improved, and he remains intermittently lethargic.  Bilirubin was stable to slightly improved, but INR has worsened.  He was empirically started on Rocephin for SBP prophylaxis (no paracentesis with elevated  INR//blood cultures with no growth so far).  He has persistent abdominal distention.  Current medications include Xifaxan and lactulose, Protonix, midodrine, ceftriaxone and Lasix/Aldactone.  Last alcohol use was noted to be about 2 months ago. Current MELD score 29.  Referring team spoke with Hepatology at [facility] with plan to transfer for further evaluation.   Current diagnoses include hepatic encephalopathy, alcoholic hepatitis, coagulopathy, and anemia.     June 13: Sodium 141, potassium 3.4, chloride 105 CO2 30, BUN 11, creatinine 0.51, glucose 103, total bilirubin 13.8, , ALT 75, INR 3, white blood cells 8.3, hemoglobin 8.4, hematocrit 25.5, platelets 89     June 12: Sodium 142, potassium 3, chloride 106, CO2 29, BUN 10, creatinine 0.59, glucose 125, calcium 9.5, magnesium 1.64, bilirubin 13.5, , ALT 78, white blood cells 7.4, hemoglobin 8.3, hematocrit 25.5, platelets 109, INR 2.9     June 9:  Right upper extremity Doppler venous ultrasound had no DVT  -chest x-ray had stable patchy bilateral pulmonary infiltrates.     June 2: COVID negative, influenza negative  -gallbladder ultrasound noted moderate volume ascites.  Thick-walled gallbladder seen, nonspecific.  Internal gallbladder sludge.  Chest x-ray had mild interstitial edema or pneumonitis with findings suggestive of developing airspace disease or atelectasis in the right chest.  -CT head had no evidence of acute intracranial hemorrhage.  Some microvascular disease is present.     February 16, 2023: EGD had no significant recurrence of varices in the esophagus.  Moderate portal hypertensive gastropathy with friable mucosa.    During my interview upon arrival to [facility], patient with waxing and waning mental status. He open eyes upon calling name, speaks few words but then falls back to sleep. He is oriented to person only as he was able to tell his name and his wife's name correctly who is present at bedside. He is able to follow  simple commands like open his mouth upon asking. Wife states patient had a long history of alcohol use prior to 2020 when he developed acute esophageal variceal bleeding and diagnosed with alcoholic liver cirrhosis. He has been following with local GI doctor Dr. Gallegos in [place] for cirrhosis. His last hospital admission was in January of this year when he was admitted with SBP and had 5 liter paracentesis. He was discharged home on course of prednisone and ciprofloxacin at that time. Wife states patient cut down alcohol since 2020 and his last alcohol use about 2-3 months ago. Wife noticed overall declining about 2 weeks prior to this hospital admission as he was getting more weak, confused, lethargy, losing weight and muscle mass, decreased appetite. On June 2nd wife noticed his blood pressure to be low in 70s when she drove him to [facility]. Denies tobacco, IVDU or other illicit drug use. He worked as a .          Past Medical History:   Diagnosis Date    Alcoholic cirrhosis of liver with ascites     Ascites     Coagulopathy     Esophageal varices with bleeding     Hepatic encephalopathy     Mixed hyperlipidemia     Portal hypertension     Thrombocytopenia, unspecified        Past Surgical History:   Procedure Laterality Date    BICEPS TENDON REPAIR Right     femur facture Right     HERNIA REPAIR Bilateral        Review of patient's allergies indicates:  No Known Allergies    No current facility-administered medications on file prior to encounter.     Current Outpatient Medications on File Prior to Encounter   Medication Sig    furosemide (LASIX) 40 MG tablet Take 40 mg by mouth 2 (two) times daily.    lactulose (CHRONULAC) 20 gram/30 mL Soln 15 ml    pantoprazole (PROTONIX) 40 MG tablet Take 40 mg by mouth.    spironolactone (ALDACTONE) 100 MG tablet Take 100 mg by mouth.    XIFAXAN 550 mg Tab Take 550 mg by mouth 2 (two) times daily.    ciprofloxacin HCl (CIPRO) 500  MG tablet Take 500 mg by mouth.    meclizine (ANTIVERT) 25 mg tablet Take 25 mg by mouth daily as needed.    mirtazapine (REMERON) 7.5 MG Tab Take 7.5 mg by mouth every evening.    nadoloL (CORGARD) 40 MG tablet Take 40 mg by mouth.     Family History       Problem Relation (Age of Onset)    Heart disease Father    Hypertension Mother          Tobacco Use    Smoking status: Former     Types: Cigarettes    Smokeless tobacco: Not on file   Substance and Sexual Activity    Alcohol use: Not Currently    Drug use: Never    Sexual activity: Yes     Review of Systems   Reason unable to perform ROS: lethargy and confusion.   Objective:     Vital Signs (Most Recent):  Temp: 97.9 °F (36.6 °C) (06/15/23 0323)  Pulse: 94 (06/15/23 0323)  Resp: 20 (06/15/23 0323)  BP: 110/69 (06/15/23 0323)  SpO2: 95 % (06/15/23 0323) Vital Signs (24h Range):  Temp:  [97.7 °F (36.5 °C)-98.8 °F (37.1 °C)] 97.9 °F (36.6 °C)  Pulse:  [] 94  Resp:  [16-20] 20  SpO2:  [94 %-95 %] 95 %  BP: (103-126)/(65-76) 110/69     Weight: 74.8 kg (164 lb 14.5 oz)  Body mass index is 23.66 kg/m².     Physical Exam  Constitutional:       General: He is not in acute distress.     Appearance: He is well-developed. He is ill-appearing. He is not diaphoretic.   HENT:      Head: Normocephalic and atraumatic.      Nose: Nose normal.      Mouth/Throat:      Pharynx: No oropharyngeal exudate.   Eyes:      General: No scleral icterus.     Extraocular Movements: EOM normal.      Conjunctiva/sclera: Conjunctivae normal.      Pupils: Pupils are equal, round, and reactive to light.   Neck:      Thyroid: No thyromegaly.      Vascular: No JVD.      Trachea: No tracheal deviation.   Cardiovascular:      Rate and Rhythm: Normal rate and regular rhythm.      Heart sounds: Normal heart sounds. No murmur heard.  Pulmonary:      Effort: Pulmonary effort is normal. No respiratory distress.      Breath sounds: Rhonchi present. No wheezing or rales.   Chest:      Chest wall:  No tenderness.   Abdominal:      General: Bowel sounds are normal. There is distension (moderate distention with ascites).      Palpations: Abdomen is soft. There is no mass.      Tenderness: There is abdominal tenderness (mild diffuse TTP w/o guarding). There is no guarding or rebound.      Hernia: A hernia (reducilble umbilical hernia) is present.   Musculoskeletal:         General: No tenderness.      Cervical back: Normal range of motion and neck supple.      Right lower leg: Edema present.      Left lower leg: Edema present.   Lymphadenopathy:      Cervical: No cervical adenopathy.   Skin:     General: Skin is warm and dry.      Coloration: Skin is jaundiced.      Findings: No erythema or rash.   Neurological:      Mental Status: He is alert.      Cranial Nerves: No cranial nerve deficit.      Motor: No abnormal muscle tone.      Coordination: Coordination normal.      Deep Tendon Reflexes: Reflexes are normal and symmetric. Reflexes normal.      Comments: Oriented to person only, waxing and waning mental status. Able to follow simple commands and speaks few words. Limited neuro status due to unable to lack of patient cooperation.             CRANIAL NERVES     CN III, IV, VI   Pupils are equal, round, and reactive to light.  Extraocular motions are normal.      Significant Labs: All pertinent labs within the past 24 hours have been reviewed.  Recent Lab Results         06/15/23  0056        Procalcitonin 0.11  Comment: A concentration < 0.25 ng/mL represents a low risk of bacterial   infection.  Procalcitonin may not be accurate among patients with localized   infection, recent trauma or major surgery, immunosuppressed state,   invasive fungal infection, renal dysfunction. Decisions regarding   initiation or continuation of antibiotic therapy should not be based   solely on procalcitonin levels.         AFP <2.0  Comment: The testing method is a chemiluminescent microparticle immunoassay   manufactured by  [employer] and performed on the    or   "[employer]" system. Values obtained with different assay manufacturers   for   methods may be different and cannot be used interchangeably.         Albumin 2.4       Alkaline Phosphatase 210       ALT 71       Ammonia 40       Anion Gap 11       AST 97       Baso # 0.06       Basophil % 0.6       BILIRUBIN TOTAL 13.5  Comment: For infants and newborns, interpretation of results should be based  on gestational age, weight and in agreement with clinical  observations.    Premature Infant recommended reference ranges:  Up to 24 hours.............<8.0 mg/dL  Up to 48 hours............<12.0 mg/dL  3-5 days..................<15.0 mg/dL  6-29 days.................<15.0 mg/dL           Comment: Values of less than 100 pg/ml are consistent with non-CHF populations.       BUN 14       Calcium 10.0       Chloride 100       CO2 29       Creatinine 0.6       Differential Method Automated       eGFR >60.0       Eos # 0.2       Eosinophil % 1.9       Ferritin 1,840       Fibrinogen 93  Comment: fibrinogen  critical result(s) called and verbal readback obtained   from carey parrish rn by POD 06/15/2023 02:10         Glucose 121       Gran # (ANC) 7.7       Gran % 74.1       Group & Rh O POS       Hematocrit 25.5       Hemoglobin 8.3       Hep A IgM Non-reactive       Hep B C IgM Non-reactive       Hepatitis B Surface Ag Non-reactive       Hepatitis C Ab Non-reactive       HIV 1/2 Ag/Ab Non-reactive       Immature Grans (Abs) 0.06  Comment: Mild elevation in immature granulocytes is non specific and   can be seen in a variety of conditions including stress response,   acute inflammation, trauma and pregnancy. Correlation with other   laboratory and clinical findings is essential.         Immature Granulocytes 0.6       INDIRECT ARNOLDO NEG       INR 2.1  Comment: Coumadin Therapy:  2.0 - 3.0 for INR for all indicators except mechanical heart valves  and  antiphospholipid syndromes which should use 2.5 - 3.5.         Lymph # 1.4       Lymph % 13.8       Magnesium 1.5       MCH 36.2       MCHC 32.5              Mono # 0.9       Mono % 9.0       MPV 9.7       nRBC 0       Phosphorus 3.2       Platelets 133       Potassium 3.2       PROTEIN TOTAL 6.0       Protime 21.6       RBC 2.29       RDW SEE COMMENT  Comment: Result not available.       Sodium 140       Specimen Outdate 06/18/2023 23:59       WBC 10.35               Significant Imaging: I have reviewed all pertinent imaging results/findings within the past 24 hours.    Assessment/Plan:     * Decompensated hepatic cirrhosis  Alcoholic liver cirrhosis with ascites   Portal hypertension   Esophageal varices w/o bleeding   Hepatic encephalopathy   Coagulopathy   Thrombocytopenia     -admitted to [facility] ICU on 6/02 for presumed septic shock and hepatic encephalopathy (ammonia 102)  -treated with pressors, broad spectrum antibiotics and lactulose with some improvement of clinical status      MELD-Na: 25 at 6/15/2023 12:56 AM  MELD: 25 at 6/15/2023 12:56 AM  Calculated from:  Serum Creatinine: 0.6 mg/dL (Using min of 1 mg/dL) at 6/15/2023 12:56 AM  Serum Sodium: 140 mmol/L (Using max of 137 mmol/L) at 6/15/2023 12:56 AM  Total Bilirubin: 13.5 mg/dL at 6/15/2023 12:56 AM  INR(ratio): 2.1 at 6/15/2023 12:56 AM  -no signs of active bleeding or overt sepsis   -did not have paracentesis at outside hospital due to elevated INR, but empirically started on rocephin for SBP PPX  -check ultrasound liver with doppler and consider paracentesis based on findings   -continue lactulose and rifaximin for hepatic encephalopathy (ammonia improved to 40)  -continue diuretics lasix and aldactone for ascites   -will check echo given elevated BNP and possible pleural effusion on CXR   -check HIV, hep panel, PETH  -continue midodrine for borderline low BP   -consider to restart nadolol for portal hypertension if BP  improves   -continue protonix daily   -monitor MELD closely with daily labs   -consult hepatology         Alcoholic cirrhosis of liver with ascites  -see above   -cut down alcohol in 2020 when diagnosed with esophageal varices and cirrhosis   -last alcohol use 2-3 months ago per wife   -check PETH   -consider addiction psychiatry consult       Portal hypertension  -see above under decomp cirrhosis       Secondary esophageal varices without bleeding  -had initial episode of bleeding in 2020 treated with banding   -no further bleeding episode since then per wife   -follows with local GI. Dr. Gallegos   -last EGD in February 2023 showed no bleeding from varices, but had portal hypertensive gastropathy   -continue protonix daily       Hepatic encephalopathy  -improved with lactulose and rifaximin   -ammonia improved from 102 to 40  -titrate lactulose to 3-4 BMs/day       Thrombocytopenia  -platelet today 133  -due to portal hypertension and splenic sequestration   -monitor trend       Coagulopathy  -INR 2.1  -due to decomp liver cirrhosis   -no signs of bleeding   -received FFP and vitamin K at outside hospital   -continue vitamin K daily and monitor INR       Delirium  -waxing and waning mental status   -due to decomp liver cirrhosis and prolonged hospital stay   -delirium precautions       Physical debility  -due to liver cirrhosis and prolonged hospital stay for 2 weeks   -PT evaluation and treat       Hypomagnesemia  -likely from diuretic use   -will replace with MgSO4      Hypokalemia  -due to diuretic use   -will replace with KCL IVPB X 2      VTE Risk Mitigation (From admission, onward)         Ordered     IP VTE LOW RISK PATIENT  Once         06/15/23 0148     Place sequential compression device  Until discontinued         06/15/23 0148                     Carroll Sinha,   Department of Hospital Medicine  [facility] - Intensive Care ([place]-)

## 2023-06-15 NOTE — NURSING
Patient arrives to floor at 0035. M.D in room to see patient and speak with spouse at bedside. Orders placed.  RN unable to get a urine sample due to bladder scan volume of 79. Patient NPO for liver scan. Will promote hydration after scan.

## 2023-06-15 NOTE — CONSULTS
[facility]  Hepatology  Consult Note    Patient Name: Cornelio Rivers  MRN: [id number]  Admission Date: 6/15/2023  Hospital Length of Stay: 0 days  Code Status: Full Code   Attending Provider: Alexsandra Menendez MD   Consulting Provider: Yoni Reilly MD  Primary Care Physician: Primary Doctor No  Principal Problem:Decompensated hepatic cirrhosis    Inpatient consult to Hepatology  Consult performed by: Yoni Reilly MD  Consult ordered by: Carroll Sinha DO      Subjective:     HPI:   Mr Rivers is a 59yo PMHx decompensated EtOH cirrhosis (Dx 2020--HE, EVH--2020, asctes c/b SBP) presents as transfer from OSH 06/15 for management of decompensated cirrhosis.    Remains encephalopathic here, unable to obtain history.    Wife reports he was diagnosed in 08/2020, was sober for a full camilla and then relapsed until last drink was 3 mos ago.    Initially presented to [facility] 06/02 with AMS, PNA. Ammonia level 102, MELD-Na 32. Required ICU admission d/t hypotension requireing pressors and BSABX. Treated empirically for SBP, no paracentesis conducted.     Previously followed with Dr Gallegos in [place] for his cirrhosis.      VS since arrival notable for AF, HR 111s, normotensive, 3L NC.  CBC w/o leukocytosis, Hgb 8.3, plts 133  BMP w/ BUN/ Cr 14/ 0.6  LFTs w/ BR 13.5, , AST/ ALT 97/ 71  INR 2.1    MELD--Na 25    US liver pending    Objective:     Vitals:    06/15/23 0737   BP: 113/67   Pulse: 82   Resp: 16   Temp: 97.6 °F (36.4 °C)       Constitutional:  not in acute distress and well developed  HENT: Head: Normal, normocephalic, atraumatic.  Eyes: conjunctiva clear and sclera nonicteric  GI: soft, non-tender, without masses or organomegaly  Skin: normal color  Neurological: alert        Assessment/Plan:     59yo PMHx decompensated EtOH cirrhosis (Dx 2020--HE, EVH--2020, asctes c/b SBP) presents as transfer from OSH 06/15 for management of decompensated cirrhosis.    Remains  encephalopathic here, unable to obtain history.    Chart review demonstrates possible last drink 2 mos ago.    MELD-Na improved from 32-->25 here    Problem List:  decompensated EtOH cirrhosis (Dx 2020--HE, EVH--2020, asctes c/b SBP)     Recommendations:  Daily CMP/ INR  Full serologic work up   F/u US liver  Continue lactulose, xifaxan  Continue home diuretic regimen  Diagnostic paracentesis, resume SBP ppx given prior history  PETH  Will need to obtain proper history once patient less encephalopathic    Thank you for involving us in the care of Cornelio Rivers. Please call with any additional questions, concerns or changes in the patient's clinical status. We will continue to follow.   Yoni Reilly MD  Gastroenterology Fellow PGY VI  [facility]

## 2023-06-15 NOTE — SUBJECTIVE & OBJECTIVE
Past Medical History:   Diagnosis Date    Alcoholic cirrhosis of liver with ascites     Ascites     Coagulopathy     Esophageal varices with bleeding     Hepatic encephalopathy     Mixed hyperlipidemia     Portal hypertension     Thrombocytopenia, unspecified        Past Surgical History:   Procedure Laterality Date    BICEPS TENDON REPAIR Right     femur facture Right     HERNIA REPAIR Bilateral        Review of patient's allergies indicates:  No Known Allergies    No current facility-administered medications on file prior to encounter.     Current Outpatient Medications on File Prior to Encounter   Medication Sig    furosemide (LASIX) 40 MG tablet Take 40 mg by mouth 2 (two) times daily.    lactulose (CHRONULAC) 20 gram/30 mL Soln 15 ml    pantoprazole (PROTONIX) 40 MG tablet Take 40 mg by mouth.    spironolactone (ALDACTONE) 100 MG tablet Take 100 mg by mouth.    XIFAXAN 550 mg Tab Take 550 mg by mouth 2 (two) times daily.    ciprofloxacin HCl (CIPRO) 500 MG tablet Take 500 mg by mouth.    meclizine (ANTIVERT) 25 mg tablet Take 25 mg by mouth daily as needed.    mirtazapine (REMERON) 7.5 MG Tab Take 7.5 mg by mouth every evening.    nadoloL (CORGARD) 40 MG tablet Take 40 mg by mouth.     Family History       Problem Relation (Age of Onset)    Heart disease Father    Hypertension Mother          Tobacco Use    Smoking status: Former     Types: Cigarettes    Smokeless tobacco: Not on file   Substance and Sexual Activity    Alcohol use: Not Currently    Drug use: Never    Sexual activity: Yes     Review of Systems   Reason unable to perform ROS: lethargy and confusion.   Objective:     Vital Signs (Most Recent):  Temp: 97.9 °F (36.6 °C) (06/15/23 0323)  Pulse: 94 (06/15/23 0323)  Resp: 20 (06/15/23 0323)  BP: 110/69 (06/15/23 0323)  SpO2: 95 % (06/15/23 0323) Vital Signs (24h Range):  Temp:  [97.7 °F (36.5 °C)-98.8 °F (37.1 °C)] 97.9 °F (36.6 °C)  Pulse:  [] 94  Resp:  [16-20] 20  SpO2:  [94 %-95 %] 95 %  BP:  (103-126)/(65-76) 110/69     Weight: 74.8 kg (164 lb 14.5 oz)  Body mass index is 23.66 kg/m².     Physical Exam  Constitutional:       General: He is not in acute distress.     Appearance: He is well-developed. He is ill-appearing. He is not diaphoretic.   HENT:      Head: Normocephalic and atraumatic.      Nose: Nose normal.      Mouth/Throat:      Pharynx: No oropharyngeal exudate.   Eyes:      General: No scleral icterus.     Extraocular Movements: EOM normal.      Conjunctiva/sclera: Conjunctivae normal.      Pupils: Pupils are equal, round, and reactive to light.   Neck:      Thyroid: No thyromegaly.      Vascular: No JVD.      Trachea: No tracheal deviation.   Cardiovascular:      Rate and Rhythm: Normal rate and regular rhythm.      Heart sounds: Normal heart sounds. No murmur heard.  Pulmonary:      Effort: Pulmonary effort is normal. No respiratory distress.      Breath sounds: Rhonchi present. No wheezing or rales.   Chest:      Chest wall: No tenderness.   Abdominal:      General: Bowel sounds are normal. There is distension (moderate distention with ascites).      Palpations: Abdomen is soft. There is no mass.      Tenderness: There is abdominal tenderness (mild diffuse TTP w/o guarding). There is no guarding or rebound.      Hernia: A hernia (reducilble umbilical hernia) is present.   Musculoskeletal:         General: No tenderness.      Cervical back: Normal range of motion and neck supple.      Right lower leg: Edema present.      Left lower leg: Edema present.   Lymphadenopathy:      Cervical: No cervical adenopathy.   Skin:     General: Skin is warm and dry.      Coloration: Skin is jaundiced.      Findings: No erythema or rash.   Neurological:      Mental Status: He is alert.      Cranial Nerves: No cranial nerve deficit.      Motor: No abnormal muscle tone.      Coordination: Coordination normal.      Deep Tendon Reflexes: Reflexes are normal and symmetric. Reflexes normal.      Comments:  Oriented to person only, waxing and waning mental status. Able to follow simple commands and speaks few words. Limited neuro status due to unable to lack of patient cooperation.             CRANIAL NERVES     CN III, IV, VI   Pupils are equal, round, and reactive to light.  Extraocular motions are normal.      Significant Labs: All pertinent labs within the past 24 hours have been reviewed.  Recent Lab Results         06/15/23  0056        Procalcitonin 0.11  Comment: A concentration < 0.25 ng/mL represents a low risk of bacterial   infection.  Procalcitonin may not be accurate among patients with localized   infection, recent trauma or major surgery, immunosuppressed state,   invasive fungal infection, renal dysfunction. Decisions regarding   initiation or continuation of antibiotic therapy should not be based   solely on procalcitonin levels.         AFP <2.0  Comment: The testing method is a chemiluminescent microparticle immunoassay   manufactured by [employer] and performed on the [employer]   or   GPal system. Values obtained with different assay manufacturers   for   methods may be different and cannot be used interchangeably.         Albumin 2.4       Alkaline Phosphatase 210       ALT 71       Ammonia 40       Anion Gap 11       AST 97       Baso # 0.06       Basophil % 0.6       BILIRUBIN TOTAL 13.5  Comment: For infants and newborns, interpretation of results should be based  on gestational age, weight and in agreement with clinical  observations.    Premature Infant recommended reference ranges:  Up to 24 hours.............<8.0 mg/dL  Up to 48 hours............<12.0 mg/dL  3-5 days..................<15.0 mg/dL  6-29 days.................<15.0 mg/dL           Comment: Values of less than 100 pg/ml are consistent with non-CHF populations.       BUN 14       Calcium 10.0       Chloride 100       CO2 29       Creatinine 0.6       Differential Method Automated       eGFR >60.0       Eos #  0.2       Eosinophil % 1.9       Ferritin 1,840       Fibrinogen 93  Comment: fibrinogen  critical result(s) called and verbal readback obtained   from carey parrish rn by POD 06/15/2023 02:10         Glucose 121       Gran # (ANC) 7.7       Gran % 74.1       Group & Rh O POS       Hematocrit 25.5       Hemoglobin 8.3       Hep A IgM Non-reactive       Hep B C IgM Non-reactive       Hepatitis B Surface Ag Non-reactive       Hepatitis C Ab Non-reactive       HIV 1/2 Ag/Ab Non-reactive       Immature Grans (Abs) 0.06  Comment: Mild elevation in immature granulocytes is non specific and   can be seen in a variety of conditions including stress response,   acute inflammation, trauma and pregnancy. Correlation with other   laboratory and clinical findings is essential.         Immature Granulocytes 0.6       INDIRECT ARNOLDO NEG       INR 2.1  Comment: Coumadin Therapy:  2.0 - 3.0 for INR for all indicators except mechanical heart valves  and antiphospholipid syndromes which should use 2.5 - 3.5.         Lymph # 1.4       Lymph % 13.8       Magnesium 1.5       MCH 36.2       MCHC 32.5              Mono # 0.9       Mono % 9.0       MPV 9.7       nRBC 0       Phosphorus 3.2       Platelets 133       Potassium 3.2       PROTEIN TOTAL 6.0       Protime 21.6       RBC 2.29       RDW SEE COMMENT  Comment: Result not available.       Sodium 140       Specimen Outdate 06/18/2023 23:59       WBC 10.35               Significant Imaging: I have reviewed all pertinent imaging results/findings within the past 24 hours.

## 2023-06-15 NOTE — PLAN OF CARE
Para completed, pt tolerated well. No apparent distress noted. 4.2 Liters removed from left abd, mepore applied CDI. Labs collected and sent. Albumin not given per protocol. Report called to MATI Hensley. Pt awaiting transport.

## 2023-06-15 NOTE — ASSESSMENT & PLAN NOTE
Alcoholic liver cirrhosis with ascites   Portal hypertension   Esophageal varices w/o bleeding   Hepatic encephalopathy   Coagulopathy   Thrombocytopenia     -admitted to [facility] ICU on 6/02 for presumed septic shock and hepatic encephalopathy (ammonia 102)  -treated with pressors, broad spectrum antibiotics and lactulose with some improvement of clinical status      MELD-Na: 25 at 6/15/2023 12:56 AM  MELD: 25 at 6/15/2023 12:56 AM  Calculated from:  Serum Creatinine: 0.6 mg/dL (Using min of 1 mg/dL) at 6/15/2023 12:56 AM  Serum Sodium: 140 mmol/L (Using max of 137 mmol/L) at 6/15/2023 12:56 AM  Total Bilirubin: 13.5 mg/dL at 6/15/2023 12:56 AM  INR(ratio): 2.1 at 6/15/2023 12:56 AM  -no signs of active bleeding or overt sepsis   -did not have paracentesis at outside hospital due to elevated INR, but empirically started on rocephin for SBP PPX  -check ultrasound liver with doppler and consider paracentesis based on findings   -continue lactulose and rifaximin for hepatic encephalopathy (ammonia improved to 40)  -continue diuretics lasix and aldactone for ascites   -will check echo given elevated BNP and possible pleural effusion on CXR   -check HIV, hep panel, PETH  -continue midodrine for borderline low BP   -consider to restart nadolol for portal hypertension if BP improves   -continue protonix daily   -monitor MELD closely with daily labs   -consult hepatology

## 2023-06-15 NOTE — ASSESSMENT & PLAN NOTE
-INR 2.1  -due to decomp liver cirrhosis   -no signs of bleeding   -received FFP and vitamin K at outside hospital   -continue vitamin K daily and monitor INR

## 2023-06-15 NOTE — NURSING
Nurses Note -- 4 Eyes      6/15/2023   12:53 AM      Skin assessed during: Admit      [] No Altered Skin Integrity Present    []Prevention Measures Documented      [x] Yes- Altered Skin Integrity Present or Discovered   [] LDA Added if Not in Epic (Describe Wound)   [x] New Altered Skin Integrity was Present on Admit and Documented in LDA   [] Wound Image Taken    Wound Care Consulted? Yes    Attending Nurse:  Misa Newell RN     Second RN/Staff Member: Mary Ellen THORNE      Non blanchable redness to buttocks     Umbilical code blister    Right arm bruising      Left arm bruising

## 2023-06-16 LAB
ALBUMIN SERPL BCP-MCNC: 2.2 G/DL (ref 3.5–5.2)
ALP SERPL-CCNC: 190 U/L (ref 55–135)
ALT SERPL W/O P-5'-P-CCNC: 60 U/L (ref 10–44)
ANION GAP SERPL CALC-SCNC: 8 MMOL/L (ref 8–16)
ANISOCYTOSIS BLD QL SMEAR: ABNORMAL
AST SERPL-CCNC: 83 U/L (ref 10–40)
BASOPHILS # BLD AUTO: 0.04 K/UL (ref 0–0.2)
BASOPHILS NFR BLD: 0.5 % (ref 0–1.9)
BILIRUB SERPL-MCNC: 11.8 MG/DL (ref 0.1–1)
BUN SERPL-MCNC: 12 MG/DL (ref 6–20)
BURR CELLS BLD QL SMEAR: ABNORMAL
CALCIUM SERPL-MCNC: 8.9 MG/DL (ref 8.7–10.5)
CHLORIDE SERPL-SCNC: 100 MMOL/L (ref 95–110)
CO2 SERPL-SCNC: 31 MMOL/L (ref 23–29)
CREAT SERPL-MCNC: 0.6 MG/DL (ref 0.5–1.4)
DIFFERENTIAL METHOD: ABNORMAL
EOSINOPHIL # BLD AUTO: 0.5 K/UL (ref 0–0.5)
EOSINOPHIL NFR BLD: 6.1 % (ref 0–8)
ERYTHROCYTE [DISTWIDTH] IN BLOOD BY AUTOMATED COUNT: ABNORMAL % (ref 11.5–14.5)
EST. GFR  (NO RACE VARIABLE): >60 ML/MIN/1.73 M^2
GLUCOSE SERPL-MCNC: 107 MG/DL (ref 70–110)
HCT VFR BLD AUTO: 24 % (ref 40–54)
HGB BLD-MCNC: 7.9 G/DL (ref 14–18)
IMM GRANULOCYTES # BLD AUTO: 0.05 K/UL (ref 0–0.04)
IMM GRANULOCYTES NFR BLD AUTO: 0.7 % (ref 0–0.5)
INR PPP: 2 (ref 0.8–1.2)
LYMPHOCYTES # BLD AUTO: 1.3 K/UL (ref 1–4.8)
LYMPHOCYTES NFR BLD: 17.6 % (ref 18–48)
MAGNESIUM SERPL-MCNC: 1.6 MG/DL (ref 1.6–2.6)
MCH RBC QN AUTO: 36.4 PG (ref 27–31)
MCHC RBC AUTO-ENTMCNC: 32.9 G/DL (ref 32–36)
MCV RBC AUTO: 111 FL (ref 82–98)
MONOCYTES # BLD AUTO: 0.7 K/UL (ref 0.3–1)
MONOCYTES NFR BLD: 9.9 % (ref 4–15)
NEUTROPHILS # BLD AUTO: 4.8 K/UL (ref 1.8–7.7)
NEUTROPHILS NFR BLD: 65.2 % (ref 38–73)
NRBC BLD-RTO: 0 /100 WBC
OVALOCYTES BLD QL SMEAR: ABNORMAL
PHOSPHATE SERPL-MCNC: 2.1 MG/DL (ref 2.7–4.5)
PLATELET # BLD AUTO: 85 K/UL (ref 150–450)
PMV BLD AUTO: 10.9 FL (ref 9.2–12.9)
POIKILOCYTOSIS BLD QL SMEAR: SLIGHT
POLYCHROMASIA BLD QL SMEAR: ABNORMAL
POTASSIUM SERPL-SCNC: 3.2 MMOL/L (ref 3.5–5.1)
PROT SERPL-MCNC: 5.3 G/DL (ref 6–8.4)
PROTHROMBIN TIME: 21 SEC (ref 9–12.5)
RBC # BLD AUTO: 2.17 M/UL (ref 4.6–6.2)
SCHISTOCYTES BLD QL SMEAR: ABNORMAL
SODIUM SERPL-SCNC: 139 MMOL/L (ref 136–145)
WBC # BLD AUTO: 7.39 K/UL (ref 3.9–12.7)

## 2023-06-16 PROCEDURE — 25000003 PHARM REV CODE 250: Mod: NTX | Performed by: HOSPITALIST

## 2023-06-16 PROCEDURE — 85610 PROTHROMBIN TIME: CPT | Mod: NTX | Performed by: HOSPITALIST

## 2023-06-16 PROCEDURE — 84100 ASSAY OF PHOSPHORUS: CPT | Mod: NTX | Performed by: HOSPITALIST

## 2023-06-16 PROCEDURE — 63600175 PHARM REV CODE 636 W HCPCS: Mod: NTX | Performed by: HOSPITALIST

## 2023-06-16 PROCEDURE — 99233 SBSQ HOSP IP/OBS HIGH 50: CPT | Mod: NTX,,, | Performed by: HOSPITALIST

## 2023-06-16 PROCEDURE — 20600001 HC STEP DOWN PRIVATE ROOM: Mod: NTX

## 2023-06-16 PROCEDURE — 85025 COMPLETE CBC W/AUTO DIFF WBC: CPT | Mod: NTX | Performed by: HOSPITALIST

## 2023-06-16 PROCEDURE — 83735 ASSAY OF MAGNESIUM: CPT | Mod: NTX | Performed by: HOSPITALIST

## 2023-06-16 PROCEDURE — 99233 PR SUBSEQUENT HOSPITAL CARE,LEVL III: ICD-10-PCS | Mod: NTX,,, | Performed by: HOSPITALIST

## 2023-06-16 PROCEDURE — 80053 COMPREHEN METABOLIC PANEL: CPT | Mod: NTX | Performed by: HOSPITALIST

## 2023-06-16 PROCEDURE — 92610 EVALUATE SWALLOWING FUNCTION: CPT | Mod: NTX

## 2023-06-16 PROCEDURE — 97535 SELF CARE MNGMENT TRAINING: CPT | Mod: NTX

## 2023-06-16 PROCEDURE — 36415 COLL VENOUS BLD VENIPUNCTURE: CPT | Mod: NTX | Performed by: HOSPITALIST

## 2023-06-16 RX ORDER — FUROSEMIDE 40 MG/1
40 TABLET ORAL DAILY
Status: DISCONTINUED | OUTPATIENT
Start: 2023-06-17 | End: 2023-06-21

## 2023-06-16 RX ORDER — THIAMINE HCL 100 MG
100 TABLET ORAL DAILY
Status: DISCONTINUED | OUTPATIENT
Start: 2023-06-17 | End: 2023-06-19

## 2023-06-16 RX ORDER — CIPROFLOXACIN 250 MG/1
500 TABLET, FILM COATED ORAL EVERY 24 HOURS
Status: DISCONTINUED | OUTPATIENT
Start: 2023-06-16 | End: 2023-06-19

## 2023-06-16 RX ADMIN — LACTULOSE 20 G: 20 SOLUTION ORAL at 09:06

## 2023-06-16 RX ADMIN — MIDODRINE HYDROCHLORIDE 10 MG: 5 TABLET ORAL at 12:06

## 2023-06-16 RX ADMIN — FUROSEMIDE 40 MG: 10 INJECTION, SOLUTION INTRAMUSCULAR; INTRAVENOUS at 09:06

## 2023-06-16 RX ADMIN — MIDODRINE HYDROCHLORIDE 10 MG: 5 TABLET ORAL at 09:06

## 2023-06-16 RX ADMIN — POTASSIUM PHOSPHATE, MONOBASIC AND POTASSIUM PHOSPHATE, DIBASIC 20 MMOL: 224; 236 INJECTION, SOLUTION, CONCENTRATE INTRAVENOUS at 02:06

## 2023-06-16 RX ADMIN — MIDODRINE HYDROCHLORIDE 10 MG: 5 TABLET ORAL at 05:06

## 2023-06-16 RX ADMIN — THIAMINE HYDROCHLORIDE 100 MG: 100 INJECTION, SOLUTION INTRAMUSCULAR; INTRAVENOUS at 09:06

## 2023-06-16 RX ADMIN — CEFTRIAXONE 1 G: 1 INJECTION, POWDER, FOR SOLUTION INTRAMUSCULAR; INTRAVENOUS at 03:06

## 2023-06-16 RX ADMIN — SPIRONOLACTONE 100 MG: 50 TABLET ORAL at 09:06

## 2023-06-16 RX ADMIN — LACTULOSE 20 G: 20 SOLUTION ORAL at 02:06

## 2023-06-16 RX ADMIN — CIPROFLOXACIN 500 MG: 250 TABLET, COATED ORAL at 02:06

## 2023-06-16 RX ADMIN — PANTOPRAZOLE SODIUM 40 MG: 40 TABLET, DELAYED RELEASE ORAL at 09:06

## 2023-06-16 RX ADMIN — RIFAXIMIN 550 MG: 550 TABLET ORAL at 09:06

## 2023-06-16 RX ADMIN — PHYTONADIONE 10 MG: 10 INJECTION, EMULSION INTRAMUSCULAR; INTRAVENOUS; SUBCUTANEOUS at 10:06

## 2023-06-16 NOTE — PLAN OF CARE
Problem: Adult Inpatient Plan of Care  Goal: Plan of Care Review  Outcome: Ongoing, Progressing  Goal: Patient-Specific Goal (Individualized)  Outcome: Ongoing, Progressing  Goal: Absence of Hospital-Acquired Illness or Injury  Outcome: Ongoing, Progressing  Goal: Optimal Comfort and Wellbeing  Outcome: Ongoing, Progressing  Goal: Readiness for Transition of Care  Outcome: Ongoing, Progressing     Problem: Fall Injury Risk  Goal: Absence of Fall and Fall-Related Injury  Outcome: Ongoing, Progressing     Problem: Gas Exchange Impaired  Goal: Optimal Gas Exchange  Outcome: Ongoing, Progressing     Problem: Skin Injury Risk Increased  Goal: Skin Health and Integrity  Outcome: Ongoing, Progressing     Problem: Impaired Wound Healing  Goal: Optimal Wound Healing  Outcome: Ongoing, Progressing  EOS: NO acute changes. 1 BM. Turn q 2. Wife at bedside. Vss.

## 2023-06-16 NOTE — PT/OT/SLP EVAL
"Speech Language Pathology Evaluation  Bedside Swallow    Patient Name:  Cornelio Rivers   MRN:  [id number]  Admitting Diagnosis: Decompensated hepatic cirrhosis    Recommendations:                 General Recommendations:  Dysphagia therapy  Diet recommendations:  Minced & Moist Diet - IDDSI Level 5, Thin liquids - IDDSI Level 0   Aspiration Precautions: 1 bite/sip at a time, Alternating bites/sips, Assistance with meals, Feed only when awake/alert, HOB to 90 degrees, Meds whole 1 at a time, Small bites/sips, and Standard aspiration precautions   General Precautions: Standard, fall  Communication strategies:  none    Assessment:     Cornelio Rivers is a 58 y.o. male with an SLP diagnosis of Dysphagia.  He presents with decreased JOHN this date. SLP will continue to follow.     History:     Past Medical History:   Diagnosis Date    Alcoholic cirrhosis of liver with ascites     Ascites     Coagulopathy     Esophageal varices with bleeding     Hepatic encephalopathy     Mixed hyperlipidemia     Portal hypertension     Thrombocytopenia, unspecified      Past Surgical History:   Procedure Laterality Date    BICEPS TENDON REPAIR Right     femur facture Right     HERNIA REPAIR Bilateral    Principal Problem:Decompensated hepatic cirrhosis     Chief Complaint: weakness      HPI: "Cornelio Rivers is a 58-year-old male with a history of alcoholic cirrhosis diagnosed in 2020 complicated by portal hypertension, bleeding esophageal varices s/p banding in 2020, ascites, thrombocytopenia, coagulopathy, hepatic encephalopathy, SBP, alcohol use, and hyperlipidemia who presents as a transfer from [facility] for management of decompensated liver cirrhosis. Patient was admitted to [facility] on June 2 with altered mental status, increasing weakness, low blood pressure, poor appetite, and possible pneumonia.  He had ammonia level 102 and a MELD score 32 (sodium 118, INR 2.2, total bilirubin 16.3, creatinine 1.02). Chest " "x-ray on admission had mild interstitial edema versus pneumonitis with findings suggestive of developing airspace disease or atelectasis in the right chest.  Initially he had hypotension with concern for septic shock and was treated in the ICU with pressors.  He was treated with broad-spectrum antibiotics.  Hemoglobin decreased during his stay and he received packed red blood cells, but he did not have signs of GI bleeding.  INR increased during his stay and he received vitamin K/FFP.  He gradually weaned off pressors and was transferred out of the ICU on June 8.  Mental status has not improved, and he remains intermittently lethargic.  Bilirubin was stable to slightly improved, but INR has worsened.  He was empirically started on Rocephin for SBP prophylaxis (no paracentesis with elevated INR//blood cultures with no growth so far).  He has persistent abdominal distention.  Current medications include Xifaxan and lactulose, Protonix, midodrine, ceftriaxone and Lasix/Aldactone.  Last alcohol use was noted to be about 2 months ago. Current MELD score 29.  Referring team spoke with Hepatology at [facility] with plan to transfer for further evaluation.   Current diagnoses include hepatic encephalopathy, alcoholic hepatitis, coagulopathy, and anemia."    Social History: Patient lives with spouse.    Prior Intubation HX:  n/a    Modified Barium Swallow: none    Chest X-Rays: 6/15:"Lungs are hypoexpanded.  Bilateral bandlike opacities in the right middle and left lower lung zones, likely representing subsegmental atelectasis versus scarring.  Additional patchy opacities elsewhere as could represent additional areas of atelectasis, noting that infectious or noninfectious inflammatory process not excluded.  No focal consolidation.  No pleural effusion. No pneumothorax."    Prior diet: reg/thin    Subjective     Spoke with RN prior to session. Pt asleep upon entry to room, requiring constant sternal rub to maintain " wakefulness across session. Spouse at bedside.     Pain/Comfort:  Pain Rating 1:  (pt did not indicate)    Respiratory Status: Room air    Objective:     Oral Musculature Evaluation  Oral Musculature: general weakness  Dentition: present and adequate  Secretion Management: adequate  Mucosal Quality: adequate  Mandibular Strength and Mobility: impaired  Oral Labial Strength and Mobility: impaired coordination  Lingual Strength and Mobility: impaired strength, functional protrusion  Velar Elevation: WFL  Volitional Cough: weak  Volitional Swallow: timely  Voice Prior to PO Intake: clear    Bedside Swallow Eval:   Consistencies Assessed:  Thin liquids ice chip x1, tsp x1, straw sips x2 oz  Puree tsp x2  Soft solids tsp x4   Soldis: cracker x1 bite    Oral Phase:   Prolonged mastication  Oral residue, cleared with liquid wash  Slow oral transit time, improved with softer solids    Pharyngeal Phase:   no overt clinical signs/symptoms of aspiration  no overt clinical signs/symptoms of pharyngeal dysphagia    Compensatory Strategies  Cyclic ingestion, feed assist, small bites    Treatment: HOB raised prior to PO trials. Feed assist provided across all consistencies. Pt required constant sternal rub to maintain JOHN during PO trials. He tolerated all trials without any cough or change in vocal quality. Education provided re: role of SLP, diet recs, swallow precs- feeding only when awake/alert, s/s aspiration and POC.  Pt and spouse verbalized understanding and agreement. RN and MD notified of impressions and recommendation post session.     Goals:   Multidisciplinary Problems       SLP Goals          Problem: SLP    Goal Priority Disciplines Outcome   SLP Goal     SLP Ongoing, Progressing   Description: Speech Language Pathology Goals  Goals expected to be met by 6/30    1. Pt will tolerate mechanical soft solids and thin liquids without any overt s/sx of airway compromise.  2. Pt will participate in ongoing swallow  assessment to determine least restrictive PO diet.                                   Plan:     Patient to be seen:  3 x/week   Plan of Care expires:  07/16/23  Plan of Care reviewed with:  patient, spouse   SLP Follow-Up:  Yes       Discharge recommendations:  nursing facility, skilled   Barriers to Discharge:  Level of Skilled Assistance Needed      Time Tracking:     SLP Treatment Date:   06/16/23  Speech Start Time:  1125  Speech Stop Time:  1140     Speech Total Time (min):  15 min    Billable Minutes: Eval Swallow and Oral Function 7 and Self Care/Home Management Training 8    06/16/2023

## 2023-06-16 NOTE — PLAN OF CARE
Problem: Fall Injury Risk  Goal: Absence of Fall and Fall-Related Injury  Outcome: Ongoing, Progressing     Problem: Gas Exchange Impaired  Goal: Optimal Gas Exchange  Outcome: Ongoing, Progressing     Problem: Skin Injury Risk Increased  Goal: Skin Health and Integrity  Outcome: Ongoing, Progressing     Problem: Impaired Wound Healing  Goal: Optimal Wound Healing  Outcome: Ongoing, Progressing       Pt awake but lethargic. VSS.  Multiple BM noted.  Family at bedside.  Safety maintained.  Will monitor pt.

## 2023-06-16 NOTE — NURSING
Patient remains very lethargic and difficult to arouse at times. Informed provider of concerns regarding patient tolerating PO meds, most medications are now IV, patient seems to be able to tolerate lactulose PO for now. Patient had paracentesis, CT and ECHO performed today. The patient's wife has remained at bedside.Patient lying in bed, sleeping on and off

## 2023-06-16 NOTE — ASSESSMENT & PLAN NOTE
-see above   -cut down alcohol in 2020 when diagnosed with esophageal varices and cirrhosis   -last alcohol use 2-3 months ago per wife   -check PETH

## 2023-06-16 NOTE — PLAN OF CARE
Problem: SLP  Goal: SLP Goal  Description: Speech Language Pathology Goals  Goals expected to be met by 6/30    1. Pt will tolerate mechanical soft solids and thin liquids without any overt s/sx of airway compromise.  2. Pt will participate in ongoing swallow assessment to determine least restrictive PO diet.        Outcome: Ongoing, Progressing

## 2023-06-16 NOTE — PROGRESS NOTES
[facility] - Intensive Care ([address] Medicine  Progress Note    Patient Name: Cornelio Rivers  MRN: [id number]  Patient Class: IP- Inpatient   Admission Date: 6/15/2023  Length of Stay: 1 days  Attending Physician: Alexsandra Menendez MD  Primary Care Provider: Primary Doctor No        Subjective:     Principal Problem:Decompensated hepatic cirrhosis        HPI:  Cornelio Rivers is a 58-year-old male with a history of alcoholic cirrhosis diagnosed in 2020 complicated by portal hypertension, bleeding esophageal varices s/p banding in 2020, ascites, thrombocytopenia, coagulopathy, hepatic encephalopathy, SBP, alcohol use, and hyperlipidemia who presents as a transfer from [facility] for management of decompensated liver cirrhosis. Patient was admitted to [facility] on June 2 with altered mental status, increasing weakness, low blood pressure, poor appetite, and possible pneumonia.  He had ammonia level 102 and a MELD score 32 (sodium 118, INR 2.2, total bilirubin 16.3, creatinine 1.02). Chest x-ray on admission had mild interstitial edema versus pneumonitis with findings suggestive of developing airspace disease or atelectasis in the right chest.  Initially he had hypotension with concern for septic shock and was treated in the ICU with pressors.  He was treated with broad-spectrum antibiotics.  Hemoglobin decreased during his stay and he received packed red blood cells, but he did not have signs of GI bleeding.  INR increased during his stay and he received vitamin K/FFP.  He gradually weaned off pressors and was transferred out of the ICU on June 8.  Mental status has not improved, and he remains intermittently lethargic.  Bilirubin was stable to slightly improved, but INR has worsened.  He was empirically started on Rocephin for SBP prophylaxis (no paracentesis with elevated INR//blood cultures with no growth so far).  He has persistent abdominal distention.  Current medications  include Xifaxan and lactulose, Protonix, midodrine, ceftriaxone and Lasix/Aldactone.  Last alcohol use was noted to be about 2 months ago. Current MELD score 29.  Referring team spoke with Hepatology at [facility] with plan to transfer for further evaluation.   Current diagnoses include hepatic encephalopathy, alcoholic hepatitis, coagulopathy, and anemia.     June 13: Sodium 141, potassium 3.4, chloride 105 CO2 30, BUN 11, creatinine 0.51, glucose 103, total bilirubin 13.8, , ALT 75, INR 3, white blood cells 8.3, hemoglobin 8.4, hematocrit 25.5, platelets 89     June 12: Sodium 142, potassium 3, chloride 106, CO2 29, BUN 10, creatinine 0.59, glucose 125, calcium 9.5, magnesium 1.64, bilirubin 13.5, , ALT 78, white blood cells 7.4, hemoglobin 8.3, hematocrit 25.5, platelets 109, INR 2.9     June 9:  Right upper extremity Doppler venous ultrasound had no DVT  -chest x-ray had stable patchy bilateral pulmonary infiltrates.     June 2: COVID negative, influenza negative  -gallbladder ultrasound noted moderate volume ascites.  Thick-walled gallbladder seen, nonspecific.  Internal gallbladder sludge.  Chest x-ray had mild interstitial edema or pneumonitis with findings suggestive of developing airspace disease or atelectasis in the right chest.  -CT head had no evidence of acute intracranial hemorrhage.  Some microvascular disease is present.     February 16, 2023: EGD had no significant recurrence of varices in the esophagus.  Moderate portal hypertensive gastropathy with friable mucosa.    During my interview upon arrival to [facility], patient with waxing and waning mental status. He open eyes upon calling name, speaks few words but then falls back to sleep. He is oriented to person only as he was able to tell his name and his wife's name correctly who is present at bedside. He is able to follow simple commands like open his mouth upon asking. Wife states patient had a long history of alcohol use prior to  2020 when he developed acute esophageal variceal bleeding and diagnosed with alcoholic liver cirrhosis. He has been following with local GI doctor Dr. Gallegos in [place] for cirrhosis. His last hospital admission was in January of this year when he was admitted with SBP and had 5 liter paracentesis. He was discharged home on course of prednisone and ciprofloxacin at that time. Wife states patient cut down alcohol since 2020 and his last alcohol use about 2-3 months ago. Wife noticed overall declining about 2 weeks prior to this hospital admission as he was getting more weak, confused, lethargy, losing weight and muscle mass, decreased appetite. On June 2nd wife noticed his blood pressure to be low in 70s when she drove him to [facility]. Denies tobacco, IVDU or other illicit drug use. He worked as a .          Overview/Hospital Course:  No notes on file    Interval History:   6/16: mental status better    Review of Systems   Reason unable to perform ROS: lethargy and confusion.   Objective:     Vital Signs (Most Recent):  Temp: 98 °F (36.7 °C) (06/16/23 1116)  Pulse: 92 (06/16/23 1116)  Resp: 16 (06/16/23 1116)  BP: 122/81 (06/16/23 1116)  SpO2: 97 % (06/16/23 1116) Vital Signs (24h Range):  Temp:  [97.7 °F (36.5 °C)-98.5 °F (36.9 °C)] 98 °F (36.7 °C)  Pulse:  [] 92  Resp:  [14-17] 16  SpO2:  [91 %-97 %] 97 %  BP: ()/(64-81) 122/81     Weight: 75.2 kg (165 lb 12.6 oz)  Body mass index is 23.79 kg/m².    Intake/Output Summary (Last 24 hours) at 6/16/2023 1353  Last data filed at 6/16/2023 1237  Gross per 24 hour   Intake 760.13 ml   Output 1520 ml   Net -759.87 ml         Physical Exam  Constitutional:       General: He is not in acute distress.     Appearance: He is well-developed. He is ill-appearing. He is not diaphoretic.   HENT:      Head: Normocephalic and atraumatic.      Nose: Nose normal.      Mouth/Throat:      Pharynx: No oropharyngeal exudate.   Eyes:      General: No  scleral icterus.     Conjunctiva/sclera: Conjunctivae normal.      Pupils: Pupils are equal, round, and reactive to light.   Neck:      Thyroid: No thyromegaly.      Vascular: No JVD.      Trachea: No tracheal deviation.   Cardiovascular:      Rate and Rhythm: Normal rate and regular rhythm.      Heart sounds: Normal heart sounds. No murmur heard.  Pulmonary:      Effort: Pulmonary effort is normal. No respiratory distress.      Breath sounds: Rhonchi present. No wheezing or rales.   Chest:      Chest wall: No tenderness.   Abdominal:      General: Bowel sounds are normal. There is distension (moderate distention with ascites).      Palpations: Abdomen is soft. There is no mass.      Tenderness: There is abdominal tenderness (mild diffuse TTP w/o guarding). There is no guarding or rebound.      Hernia: A hernia (reducilble umbilical hernia) is present.   Musculoskeletal:         General: No tenderness.      Cervical back: Normal range of motion and neck supple.      Right lower leg: Edema present.      Left lower leg: Edema present.   Lymphadenopathy:      Cervical: No cervical adenopathy.   Skin:     General: Skin is warm and dry.      Coloration: Skin is jaundiced.      Findings: No erythema or rash.   Neurological:      Mental Status: He is alert.      Cranial Nerves: No cranial nerve deficit.      Motor: No abnormal muscle tone.      Coordination: Coordination normal.      Deep Tendon Reflexes: Reflexes are normal and symmetric. Reflexes normal.      Comments: Oriented to person only, waxing and waning mental status. Able to follow simple commands and speaks few words. Limited neuro status due to unable to lack of patient cooperation.            Significant Labs: All pertinent labs within the past 24 hours have been reviewed.    Significant Imaging: I have reviewed all pertinent imaging results/findings within the past 24 hours.      Assessment/Plan:      * Decompensated hepatic cirrhosis  Alcoholic liver  cirrhosis with ascites   Portal hypertension   Esophageal varices w/o bleeding   Hepatic encephalopathy   Coagulopathy   Thrombocytopenia     -admitted to [facility] ICU on 6/02 for presumed septic shock and hepatic encephalopathy (ammonia 102)  -treated with pressors, broad spectrum antibiotics and lactulose with some improvement of clinical status      MELD-Na: 24 at 6/16/2023  6:17 AM  MELD: 24 at 6/16/2023  6:17 AM  Calculated from:  Serum Creatinine: 0.6 mg/dL (Using min of 1 mg/dL) at 6/16/2023  6:17 AM  Serum Sodium: 139 mmol/L (Using max of 137 mmol/L) at 6/16/2023  6:17 AM  Total Bilirubin: 11.8 mg/dL at 6/16/2023  6:17 AM  INR(ratio): 2.0 at 6/16/2023  6:17 AM  -no signs of active bleeding or overt sepsis   -did not have paracentesis at outside hospital due to elevated INR, but empirically started on rocephin for SBP PPX  - paracentesis on 6/16 no SBP   -continue lactulose and rifaximin for hepatic encephalopathy (ammonia improved to 40)  -continue diuretics lasix and aldactone for ascites   -will check echo given elevated BNP and possible pleural effusion on CXR   -check HIV, hep panel, PETH  -continue midodrine for borderline low BP   -consider to restart nadolol for portal hypertension if BP improves   -continue protonix daily   -monitor MELD closely with daily labs   -consult hepatology         Delirium  -waxing and waning mental status   -due to decomp liver cirrhosis and prolonged hospital stay   -delirium precautions       Physical debility  -due to liver cirrhosis and prolonged hospital stay for 2 weeks   -PT evaluation and treat       Hypomagnesemia  -likely from diuretic use   -will replace with MgSO4      Hypokalemia  -due to diuretic use   -will replace with KCL IVPB X 2      Coagulopathy  -INR 2.1  -due to decomp liver cirrhosis   -no signs of bleeding   -received FFP and vitamin K at outside hospital   -continue vitamin K daily and monitor INR       Thrombocytopenia  -platelet  today 133  -due to portal hypertension and splenic sequestration   -monitor trend       Hepatic encephalopathy  -improved with lactulose and rifaximin   -ammonia improved from 102 to 40  -titrate lactulose to 3-4 BMs/day       Secondary esophageal varices without bleeding  -had initial episode of bleeding in 2020 treated with banding   -no further bleeding episode since then per wife   -follows with local GI. Dr. Gallegos   -last EGD in February 2023 showed no bleeding from varices, but had portal hypertensive gastropathy   -continue protonix daily       Portal hypertension  -see above under decomp cirrhosis       Alcoholic cirrhosis of liver with ascites  -see above   -cut down alcohol in 2020 when diagnosed with esophageal varices and cirrhosis   -last alcohol use 2-3 months ago per wife   -check [facility]           VTE Risk Mitigation (From admission, onward)         Ordered     IP VTE LOW RISK PATIENT  Once         06/15/23 0148     Place sequential compression device  Until discontinued         06/15/23 0148                Discharge Planning   NICKOLAS: 6/20/2023     Code Status: Full Code   Is the patient medically ready for discharge?: No    Reason for patient still in hospital (select all that apply): Patient trending condition  Discharge Plan A: Home with family                  Alexsandra Menendez MD  Department of Hospital Medicine   [facility] - Intensive Care ([place])

## 2023-06-16 NOTE — CONSULTS
Patient seen for wound care consultation.   Reviewed chart for this encounter.   See Flow Sheet for findings.    Pt sitting up in bed with wife at bedside, agreed to assessment.   No open wounds noted, umbilicus is a hernia.   No erythema noted to buttocks.    No open wound noted    RECOMMENDATIONS:  Waffle as preventative.    Discussed POC with patient and primary RN.   See EMR for orders & patient education.    Discussed nutrition and the role of protein in wound healing with the patient. Instructed patient to optimize protein for wound healing.    Nursing to continue care.  Nursing to maintain pressure injury prevention interventions.    WC sign off    Please re-consult if needed.   06/16/23 0800   WOCN Assessment   WOCN Total Time (mins) 30   Visit Date 06/16/23   Visit Time 0800   Consult Type New   WOCN Speciality Wound   Intervention assessed;applied;chart review;coordination of care;orders   Teaching on-going   Pressure Injury Prevention    Check Moisture Management Pad Done        Altered Skin Integrity 06/15/23 0045 Umbilical area Blister(s)   Date First Assessed/Time First Assessed: 06/15/23 0045   Altered Skin Integrity Present on Admission - Did Patient arrive to the hospital with altered skin?: yes  Location: Umbilical area  Is this injury device related?: No  Primary Wound Type: Bliste...   Wound Image    Dressing Appearance Open to air   Appearance Intact   Wound Length (cm) 0 cm   Wound Width (cm) 0 cm   Wound Depth (cm) 0 cm   Wound Volume (cm^3) 0 cm^3   Wound Surface Area (cm^2) 0 cm^2        Altered Skin Integrity 06/15/23 0045  Buttocks Intact skin with non-blanchable redness of localized area   Date First Assessed/Time First Assessed: 06/15/23 0045   Altered Skin Integrity Present on Admission - Did Patient arrive to the hospital with altered skin?: yes  Side: (c)   Location: Buttocks  Is this injury device related?: No  Description of Alter...   Wound Image    Wound Length (cm) 0 cm   Wound  Width (cm) 0 cm   Wound Depth (cm) 0 cm   Wound Volume (cm^3) 0 cm^3   Wound Surface Area (cm^2) 0 cm^2   Male External Urinary Catheter 06/15/23 0110 Small   Placement Date/Time: 06/15/23 0110   Inserted by: RN  External Urinary Catheter Sizes: Small   Collection Container Standard drainage bag   Securement Method secured to top of thigh w/ adhesive device   Skin no redness;no breakdown   Tolerance no signs/symptoms of discomfort

## 2023-06-16 NOTE — ASSESSMENT & PLAN NOTE
Alcoholic liver cirrhosis with ascites   Portal hypertension   Esophageal varices w/o bleeding   Hepatic encephalopathy   Coagulopathy   Thrombocytopenia     -admitted to [facility] ICU on 6/02 for presumed septic shock and hepatic encephalopathy (ammonia 102)  -treated with pressors, broad spectrum antibiotics and lactulose with some improvement of clinical status      MELD-Na: 24 at 6/16/2023  6:17 AM  MELD: 24 at 6/16/2023  6:17 AM  Calculated from:  Serum Creatinine: 0.6 mg/dL (Using min of 1 mg/dL) at 6/16/2023  6:17 AM  Serum Sodium: 139 mmol/L (Using max of 137 mmol/L) at 6/16/2023  6:17 AM  Total Bilirubin: 11.8 mg/dL at 6/16/2023  6:17 AM  INR(ratio): 2.0 at 6/16/2023  6:17 AM  -no signs of active bleeding or overt sepsis   -did not have paracentesis at outside hospital due to elevated INR, but empirically started on rocephin for SBP PPX  - paracentesis on 6/16 no SBP   -continue lactulose and rifaximin for hepatic encephalopathy (ammonia improved to 40)  -continue diuretics lasix and aldactone for ascites   -will check echo given elevated BNP and possible pleural effusion on CXR   -check HIV, hep panel, PETH  -continue midodrine for borderline low BP   -consider to restart nadolol for portal hypertension if BP improves   -continue protonix daily   -monitor MELD closely with daily labs   -consult hepatology

## 2023-06-16 NOTE — SUBJECTIVE & OBJECTIVE
Interval History:   6/16: mental status better    Review of Systems   Reason unable to perform ROS: lethargy and confusion.   Objective:     Vital Signs (Most Recent):  Temp: 98 °F (36.7 °C) (06/16/23 1116)  Pulse: 92 (06/16/23 1116)  Resp: 16 (06/16/23 1116)  BP: 122/81 (06/16/23 1116)  SpO2: 97 % (06/16/23 1116) Vital Signs (24h Range):  Temp:  [97.7 °F (36.5 °C)-98.5 °F (36.9 °C)] 98 °F (36.7 °C)  Pulse:  [] 92  Resp:  [14-17] 16  SpO2:  [91 %-97 %] 97 %  BP: ()/(64-81) 122/81     Weight: 75.2 kg (165 lb 12.6 oz)  Body mass index is 23.79 kg/m².    Intake/Output Summary (Last 24 hours) at 6/16/2023 1353  Last data filed at 6/16/2023 1237  Gross per 24 hour   Intake 760.13 ml   Output 1520 ml   Net -759.87 ml         Physical Exam  Constitutional:       General: He is not in acute distress.     Appearance: He is well-developed. He is ill-appearing. He is not diaphoretic.   HENT:      Head: Normocephalic and atraumatic.      Nose: Nose normal.      Mouth/Throat:      Pharynx: No oropharyngeal exudate.   Eyes:      General: No scleral icterus.     Conjunctiva/sclera: Conjunctivae normal.      Pupils: Pupils are equal, round, and reactive to light.   Neck:      Thyroid: No thyromegaly.      Vascular: No JVD.      Trachea: No tracheal deviation.   Cardiovascular:      Rate and Rhythm: Normal rate and regular rhythm.      Heart sounds: Normal heart sounds. No murmur heard.  Pulmonary:      Effort: Pulmonary effort is normal. No respiratory distress.      Breath sounds: Rhonchi present. No wheezing or rales.   Chest:      Chest wall: No tenderness.   Abdominal:      General: Bowel sounds are normal. There is distension (moderate distention with ascites).      Palpations: Abdomen is soft. There is no mass.      Tenderness: There is abdominal tenderness (mild diffuse TTP w/o guarding). There is no guarding or rebound.      Hernia: A hernia (reducilble umbilical hernia) is present.   Musculoskeletal:          General: No tenderness.      Cervical back: Normal range of motion and neck supple.      Right lower leg: Edema present.      Left lower leg: Edema present.   Lymphadenopathy:      Cervical: No cervical adenopathy.   Skin:     General: Skin is warm and dry.      Coloration: Skin is jaundiced.      Findings: No erythema or rash.   Neurological:      Mental Status: He is alert.      Cranial Nerves: No cranial nerve deficit.      Motor: No abnormal muscle tone.      Coordination: Coordination normal.      Deep Tendon Reflexes: Reflexes are normal and symmetric. Reflexes normal.      Comments: Oriented to person only, waxing and waning mental status. Able to follow simple commands and speaks few words. Limited neuro status due to unable to lack of patient cooperation.            Significant Labs: All pertinent labs within the past 24 hours have been reviewed.    Significant Imaging: I have reviewed all pertinent imaging results/findings within the past 24 hours.

## 2023-06-17 LAB
ALBUMIN SERPL BCP-MCNC: 2.2 G/DL (ref 3.5–5.2)
ALP SERPL-CCNC: 184 U/L (ref 55–135)
ALT SERPL W/O P-5'-P-CCNC: 57 U/L (ref 10–44)
ANION GAP SERPL CALC-SCNC: 10 MMOL/L (ref 8–16)
AST SERPL-CCNC: 81 U/L (ref 10–40)
BASOPHILS # BLD AUTO: 0.07 K/UL (ref 0–0.2)
BASOPHILS NFR BLD: 0.9 % (ref 0–1.9)
BILIRUB SERPL-MCNC: 11.7 MG/DL (ref 0.1–1)
BUN SERPL-MCNC: 10 MG/DL (ref 6–20)
CALCIUM SERPL-MCNC: 8.9 MG/DL (ref 8.7–10.5)
CHLORIDE SERPL-SCNC: 94 MMOL/L (ref 95–110)
CO2 SERPL-SCNC: 30 MMOL/L (ref 23–29)
CREAT SERPL-MCNC: 0.5 MG/DL (ref 0.5–1.4)
DIFFERENTIAL METHOD: ABNORMAL
EOSINOPHIL # BLD AUTO: 0.5 K/UL (ref 0–0.5)
EOSINOPHIL NFR BLD: 6.7 % (ref 0–8)
ERYTHROCYTE [DISTWIDTH] IN BLOOD BY AUTOMATED COUNT: ABNORMAL % (ref 11.5–14.5)
EST. GFR  (NO RACE VARIABLE): >60 ML/MIN/1.73 M^2
GLUCOSE SERPL-MCNC: 81 MG/DL (ref 70–110)
HCT VFR BLD AUTO: 22.2 % (ref 40–54)
HGB BLD-MCNC: 7.6 G/DL (ref 14–18)
IMM GRANULOCYTES # BLD AUTO: 0.05 K/UL (ref 0–0.04)
IMM GRANULOCYTES NFR BLD AUTO: 0.7 % (ref 0–0.5)
INR PPP: 2.2 (ref 0.8–1.2)
LYMPHOCYTES # BLD AUTO: 1.6 K/UL (ref 1–4.8)
LYMPHOCYTES NFR BLD: 20.5 % (ref 18–48)
MAGNESIUM SERPL-MCNC: 1.4 MG/DL (ref 1.6–2.6)
MCH RBC QN AUTO: 37.6 PG (ref 27–31)
MCHC RBC AUTO-ENTMCNC: 34.2 G/DL (ref 32–36)
MCV RBC AUTO: 110 FL (ref 82–98)
MONOCYTES # BLD AUTO: 0.8 K/UL (ref 0.3–1)
MONOCYTES NFR BLD: 10.7 % (ref 4–15)
NEUTROPHILS # BLD AUTO: 4.6 K/UL (ref 1.8–7.7)
NEUTROPHILS NFR BLD: 60.5 % (ref 38–73)
NRBC BLD-RTO: 0 /100 WBC
PHOSPHATE SERPL-MCNC: 2.5 MG/DL (ref 2.7–4.5)
PLATELET # BLD AUTO: 88 K/UL (ref 150–450)
PMV BLD AUTO: 10.3 FL (ref 9.2–12.9)
POTASSIUM SERPL-SCNC: 3.5 MMOL/L (ref 3.5–5.1)
PROT SERPL-MCNC: 5.2 G/DL (ref 6–8.4)
PROTHROMBIN TIME: 22.2 SEC (ref 9–12.5)
RBC # BLD AUTO: 2.02 M/UL (ref 4.6–6.2)
SODIUM SERPL-SCNC: 134 MMOL/L (ref 136–145)
WBC # BLD AUTO: 7.6 K/UL (ref 3.9–12.7)

## 2023-06-17 PROCEDURE — 36415 COLL VENOUS BLD VENIPUNCTURE: CPT | Mod: NTX | Performed by: HOSPITALIST

## 2023-06-17 PROCEDURE — 25000003 PHARM REV CODE 250: Mod: NTX | Performed by: HOSPITALIST

## 2023-06-17 PROCEDURE — 20600001 HC STEP DOWN PRIVATE ROOM: Mod: NTX

## 2023-06-17 PROCEDURE — 63600175 PHARM REV CODE 636 W HCPCS: Mod: NTX | Performed by: HOSPITALIST

## 2023-06-17 PROCEDURE — 99233 SBSQ HOSP IP/OBS HIGH 50: CPT | Mod: NTX,,, | Performed by: HOSPITALIST

## 2023-06-17 PROCEDURE — 85610 PROTHROMBIN TIME: CPT | Mod: NTX | Performed by: HOSPITALIST

## 2023-06-17 PROCEDURE — 80053 COMPREHEN METABOLIC PANEL: CPT | Mod: NTX | Performed by: HOSPITALIST

## 2023-06-17 PROCEDURE — 84100 ASSAY OF PHOSPHORUS: CPT | Mod: NTX | Performed by: HOSPITALIST

## 2023-06-17 PROCEDURE — 97165 OT EVAL LOW COMPLEX 30 MIN: CPT | Mod: NTX

## 2023-06-17 PROCEDURE — 99232 PR SUBSEQUENT HOSPITAL CARE,LEVL II: ICD-10-PCS | Mod: NTX,,, | Performed by: INTERNAL MEDICINE

## 2023-06-17 PROCEDURE — 85025 COMPLETE CBC W/AUTO DIFF WBC: CPT | Mod: NTX | Performed by: HOSPITALIST

## 2023-06-17 PROCEDURE — 83735 ASSAY OF MAGNESIUM: CPT | Mod: NTX | Performed by: HOSPITALIST

## 2023-06-17 PROCEDURE — 99232 SBSQ HOSP IP/OBS MODERATE 35: CPT | Mod: NTX,,, | Performed by: INTERNAL MEDICINE

## 2023-06-17 PROCEDURE — 99233 PR SUBSEQUENT HOSPITAL CARE,LEVL III: ICD-10-PCS | Mod: NTX,,, | Performed by: HOSPITALIST

## 2023-06-17 RX ORDER — LACTULOSE 10 G/15ML
30 SOLUTION ORAL EVERY 6 HOURS
Status: DISCONTINUED | OUTPATIENT
Start: 2023-06-17 | End: 2023-06-23

## 2023-06-17 RX ORDER — LACTULOSE 10 G/15ML
30 SOLUTION ORAL 3 TIMES DAILY
Status: DISCONTINUED | OUTPATIENT
Start: 2023-06-17 | End: 2023-06-17

## 2023-06-17 RX ADMIN — PANTOPRAZOLE SODIUM 40 MG: 40 TABLET, DELAYED RELEASE ORAL at 08:06

## 2023-06-17 RX ADMIN — LACTULOSE 20 G: 20 SOLUTION ORAL at 08:06

## 2023-06-17 RX ADMIN — MIDODRINE HYDROCHLORIDE 10 MG: 5 TABLET ORAL at 11:06

## 2023-06-17 RX ADMIN — SPIRONOLACTONE 100 MG: 50 TABLET ORAL at 08:06

## 2023-06-17 RX ADMIN — MIDODRINE HYDROCHLORIDE 10 MG: 5 TABLET ORAL at 08:06

## 2023-06-17 RX ADMIN — THIAMINE HCL TAB 100 MG 100 MG: 100 TAB at 08:06

## 2023-06-17 RX ADMIN — PHYTONADIONE 10 MG: 10 INJECTION, EMULSION INTRAMUSCULAR; INTRAVENOUS; SUBCUTANEOUS at 09:06

## 2023-06-17 RX ADMIN — RIFAXIMIN 550 MG: 550 TABLET ORAL at 08:06

## 2023-06-17 RX ADMIN — LACTULOSE 30 G: 20 SOLUTION ORAL at 02:06

## 2023-06-17 RX ADMIN — MIDODRINE HYDROCHLORIDE 10 MG: 5 TABLET ORAL at 05:06

## 2023-06-17 RX ADMIN — FUROSEMIDE 40 MG: 40 TABLET ORAL at 08:06

## 2023-06-17 RX ADMIN — CIPROFLOXACIN 500 MG: 250 TABLET, COATED ORAL at 08:06

## 2023-06-17 NOTE — PLAN OF CARE
Problem: Occupational Therapy  Goal: Occupational Therapy Goal  Description: Goals to be met by: 7/8/23     Patient will increase functional independence with ADLs by performing:    Grooming while seated with Moderate Assistance.  Sitting at edge of bed x5 minutes with Moderate Assistance.  Rolling to Right, Left with Moderate Assistance.   Supine to sit with Maximum Assistance.  Upper extremity exercise program x10 reps per handout, with assistance as needed.    Outcome: Ongoing, Progressing

## 2023-06-17 NOTE — PROGRESS NOTES
[facility] - Intensive Care ([address] Medicine  Progress Note    Patient Name: Cornelio Rivers  MRN: [id number]  Patient Class: IP- Inpatient   Admission Date: 6/15/2023  Length of Stay: 2 days  Attending Physician: Alexsandra Menendez MD  Primary Care Provider: Primary Doctor No        Subjective:     Principal Problem:Decompensated hepatic cirrhosis        HPI:  Cornelio Rivers is a 58-year-old male with a history of alcoholic cirrhosis diagnosed in 2020 complicated by portal hypertension, bleeding esophageal varices s/p banding in 2020, ascites, thrombocytopenia, coagulopathy, hepatic encephalopathy, SBP, alcohol use, and hyperlipidemia who presents as a transfer from [facility] for management of decompensated liver cirrhosis. Patient was admitted to [facility] on June 2 with altered mental status, increasing weakness, low blood pressure, poor appetite, and possible pneumonia.  He had ammonia level 102 and a MELD score 32 (sodium 118, INR 2.2, total bilirubin 16.3, creatinine 1.02). Chest x-ray on admission had mild interstitial edema versus pneumonitis with findings suggestive of developing airspace disease or atelectasis in the right chest.  Initially he had hypotension with concern for septic shock and was treated in the ICU with pressors.  He was treated with broad-spectrum antibiotics.  Hemoglobin decreased during his stay and he received packed red blood cells, but he did not have signs of GI bleeding.  INR increased during his stay and he received vitamin K/FFP.  He gradually weaned off pressors and was transferred out of the ICU on June 8.  Mental status has not improved, and he remains intermittently lethargic.  Bilirubin was stable to slightly improved, but INR has worsened.  He was empirically started on Rocephin for SBP prophylaxis (no paracentesis with elevated INR//blood cultures with no growth so far).  He has persistent abdominal distention.  Current medications  include Xifaxan and lactulose, Protonix, midodrine, ceftriaxone and Lasix/Aldactone.  Last alcohol use was noted to be about 2 months ago. Current MELD score 29.  Referring team spoke with Hepatology at [facility] with plan to transfer for further evaluation.   Current diagnoses include hepatic encephalopathy, alcoholic hepatitis, coagulopathy, and anemia.     June 13: Sodium 141, potassium 3.4, chloride 105 CO2 30, BUN 11, creatinine 0.51, glucose 103, total bilirubin 13.8, , ALT 75, INR 3, white blood cells 8.3, hemoglobin 8.4, hematocrit 25.5, platelets 89     June 12: Sodium 142, potassium 3, chloride 106, CO2 29, BUN 10, creatinine 0.59, glucose 125, calcium 9.5, magnesium 1.64, bilirubin 13.5, , ALT 78, white blood cells 7.4, hemoglobin 8.3, hematocrit 25.5, platelets 109, INR 2.9     June 9:  Right upper extremity Doppler venous ultrasound had no DVT  -chest x-ray had stable patchy bilateral pulmonary infiltrates.     June 2: COVID negative, influenza negative  -gallbladder ultrasound noted moderate volume ascites.  Thick-walled gallbladder seen, nonspecific.  Internal gallbladder sludge.  Chest x-ray had mild interstitial edema or pneumonitis with findings suggestive of developing airspace disease or atelectasis in the right chest.  -CT head had no evidence of acute intracranial hemorrhage.  Some microvascular disease is present.     February 16, 2023: EGD had no significant recurrence of varices in the esophagus.  Moderate portal hypertensive gastropathy with friable mucosa.    During my interview upon arrival to [facility], patient with waxing and waning mental status. He open eyes upon calling name, speaks few words but then falls back to sleep. He is oriented to person only as he was able to tell his name and his wife's name correctly who is present at bedside. He is able to follow simple commands like open his mouth upon asking. Wife states patient had a long history of alcohol use prior to  2020 when he developed acute esophageal variceal bleeding and diagnosed with alcoholic liver cirrhosis. He has been following with local GI doctor Dr. Gallegos in [place] for cirrhosis. His last hospital admission was in January of this year when he was admitted with SBP and had 5 liter paracentesis. He was discharged home on course of prednisone and ciprofloxacin at that time. Wife states patient cut down alcohol since 2020 and his last alcohol use about 2-3 months ago. Wife noticed overall declining about 2 weeks prior to this hospital admission as he was getting more weak, confused, lethargy, losing weight and muscle mass, decreased appetite. On June 2nd wife noticed his blood pressure to be low in 70s when she drove him to [facility]. Denies tobacco, IVDU or other illicit drug use. He worked as a .          Overview/Hospital Course:  No notes on file    Interval History:  6/17: remains encephalopathic    Review of Systems   Reason unable to perform ROS: lethargy and confusion.   Objective:     Vital Signs (Most Recent):  Temp: 98.3 °F (36.8 °C) (06/17/23 1130)  Pulse: 92 (06/17/23 1130)  Resp: (!) 22 (06/17/23 1130)  BP: 101/63 (06/17/23 1130)  SpO2: 95 % (06/17/23 1130) Vital Signs (24h Range):  Temp:  [97.2 °F (36.2 °C)-98.8 °F (37.1 °C)] 98.3 °F (36.8 °C)  Pulse:  [] 92  Resp:  [15-24] 22  SpO2:  [92 %-98 %] 95 %  BP: (101-117)/(63-72) 101/63     Weight: 73.8 kg (162 lb 11.2 oz)  Body mass index is 23.34 kg/m².    Intake/Output Summary (Last 24 hours) at 6/17/2023 1420  Last data filed at 6/17/2023 1412  Gross per 24 hour   Intake 1358.96 ml   Output 1250 ml   Net 108.96 ml         Physical Exam  Constitutional:       General: He is not in acute distress.     Appearance: He is well-developed. He is ill-appearing. He is not diaphoretic.   HENT:      Head: Normocephalic and atraumatic.      Nose: Nose normal.      Mouth/Throat:      Pharynx: No oropharyngeal exudate.   Eyes:       General: No scleral icterus.     Conjunctiva/sclera: Conjunctivae normal.      Pupils: Pupils are equal, round, and reactive to light.   Neck:      Thyroid: No thyromegaly.      Vascular: No JVD.      Trachea: No tracheal deviation.   Cardiovascular:      Rate and Rhythm: Normal rate and regular rhythm.      Heart sounds: Normal heart sounds. No murmur heard.  Pulmonary:      Effort: Pulmonary effort is normal. No respiratory distress.      Breath sounds: Rhonchi present. No wheezing or rales.   Chest:      Chest wall: No tenderness.   Abdominal:      General: Bowel sounds are normal. There is distension (moderate distention with ascites).      Palpations: Abdomen is soft. There is no mass.      Tenderness: There is abdominal tenderness (mild diffuse TTP w/o guarding). There is no guarding or rebound.      Hernia: A hernia (reducilble umbilical hernia) is present.   Musculoskeletal:         General: No tenderness.      Cervical back: Normal range of motion and neck supple.      Right lower leg: Edema present.      Left lower leg: Edema present.   Lymphadenopathy:      Cervical: No cervical adenopathy.   Skin:     General: Skin is warm and dry.      Coloration: Skin is jaundiced.      Findings: No erythema or rash.   Neurological:      Mental Status: He is alert.      Cranial Nerves: No cranial nerve deficit.      Motor: No abnormal muscle tone.      Coordination: Coordination normal.      Deep Tendon Reflexes: Reflexes are normal and symmetric. Reflexes normal.      Comments: Oriented to person only, waxing and waning mental status. Able to follow simple commands and speaks few words. Limited neuro status due to unable to lack of patient cooperation.            Significant Labs: All pertinent labs within the past 24 hours have been reviewed.    Significant Imaging: I have reviewed all pertinent imaging results/findings within the past 24 hours.      Assessment/Plan:      * Decompensated hepatic cirrhosis  Alcoholic  liver cirrhosis with ascites   Portal hypertension   Esophageal varices w/o bleeding   Hepatic encephalopathy   Coagulopathy   Thrombocytopenia     -admitted to [facility] ICU on 6/02 for presumed septic shock and hepatic encephalopathy (ammonia 102)  -treated with pressors, broad spectrum antibiotics and lactulose with some improvement of clinical status      MELD-Na: 24 at 6/16/2023  6:17 AM  MELD: 24 at 6/16/2023  6:17 AM  Calculated from:  Serum Creatinine: 0.6 mg/dL (Using min of 1 mg/dL) at 6/16/2023  6:17 AM  Serum Sodium: 139 mmol/L (Using max of 137 mmol/L) at 6/16/2023  6:17 AM  Total Bilirubin: 11.8 mg/dL at 6/16/2023  6:17 AM  INR(ratio): 2.0 at 6/16/2023  6:17 AM  -no signs of active bleeding or overt sepsis   -did not have paracentesis at outside hospital due to elevated INR, but empirically started on rocephin for SBP PPX  - paracentesis on 6/16 no SBP   -continue lactulose and rifaximin for hepatic encephalopathy (ammonia improved to 40)  -continue diuretics lasix and aldactone for ascites   -will check echo given elevated BNP and possible pleural effusion on CXR   -check HIV, hep panel, PETH  -continue midodrine for borderline low BP   -consider to restart nadolol for portal hypertension if BP improves   -continue protonix daily   -monitor MELD closely with daily labs   -consult hepatology         Delirium  -waxing and waning mental status   -due to decomp liver cirrhosis and prolonged hospital stay   -delirium precautions       Physical debility  -due to liver cirrhosis and prolonged hospital stay for 2 weeks   -PT evaluation and treat       Hypomagnesemia  -likely from diuretic use   -will replace with MgSO4      Hypokalemia  -due to diuretic use   -will replace with KCL IVPB X 2      Coagulopathy  -INR 2.1  -due to decomp liver cirrhosis   -no signs of bleeding   -received FFP and vitamin K at outside hospital   -continue vitamin K daily and monitor INR        Thrombocytopenia  -platelet today 133  -due to portal hypertension and splenic sequestration   -monitor trend       Hepatic encephalopathy  -improved with lactulose and rifaximin   -ammonia improved from 102 to 40  -titrate lactulose to 3-4 BMs/day       Secondary esophageal varices without bleeding  -had initial episode of bleeding in 2020 treated with banding   -no further bleeding episode since then per wife   -follows with local GI. Dr. Gallegos   -last EGD in February 2023 showed no bleeding from varices, but had portal hypertensive gastropathy   -continue protonix daily       Portal hypertension  -see above under decomp cirrhosis       Alcoholic cirrhosis of liver with ascites  -see above   -cut down alcohol in 2020 when diagnosed with esophageal varices and cirrhosis   -last alcohol use 2-3 months ago per wife   -check [facility]           VTE Risk Mitigation (From admission, onward)         Ordered     IP VTE LOW RISK PATIENT  Once         06/15/23 0148     Place sequential compression device  Until discontinued         06/15/23 0148                Discharge Planning   NICKOLAS: 6/20/2023     Code Status: Full Code   Is the patient medically ready for discharge?: No    Reason for patient still in hospital (select all that apply): Patient new problem  Discharge Plan A: Home with family                  Alexsandra Menendez MD  Department of Hospital Medicine   [facility] [address] - Intensive Care ([place]-[address])

## 2023-06-17 NOTE — PLAN OF CARE
Problem: Adult Inpatient Plan of Care  Goal: Plan of Care Review  Outcome: Ongoing, Progressing  Goal: Patient-Specific Goal (Individualized)  Outcome: Ongoing, Progressing  Goal: Absence of Hospital-Acquired Illness or Injury  Outcome: Ongoing, Progressing  Goal: Optimal Comfort and Wellbeing  Outcome: Ongoing, Progressing  Goal: Readiness for Transition of Care  Outcome: Ongoing, Progressing     Problem: Fall Injury Risk  Goal: Absence of Fall and Fall-Related Injury  Outcome: Ongoing, Progressing     Problem: Gas Exchange Impaired  Goal: Optimal Gas Exchange  Outcome: Ongoing, Progressing     Problem: Skin Injury Risk Increased  Goal: Skin Health and Integrity  Outcome: Ongoing, Progressing     Problem: Impaired Wound Healing  Goal: Optimal Wound Healing  Outcome: Ongoing, Progressing     EOS: VSS. Turn q 2. Wife at bedside. Patient more alert tonight than previous, but still very confused.

## 2023-06-17 NOTE — PT/OT/SLP EVAL
Occupational Therapy   Evaluation    Name: Cornelio Rivers  MRN: [id number]  Admitting Diagnosis: Decompensated hepatic cirrhosis  Recent Surgery: * No surgery found *      Recommendations:     Discharge Recommendations: nursing facility, skilled  Discharge Equipment Recommendations:  hospital bed, lift device, wheelchair  Barriers to discharge:   (need for increased skilled assistance)    Assessment:     Cornelio Rivers is a 58 y.o. male with a medical diagnosis of Decompensated hepatic cirrhosis.  He presents with the following performance deficits affecting function: weakness, impaired endurance, impaired self care skills, impaired functional mobility, gait instability, impaired balance, impaired cognition, decreased upper extremity function, decreased lower extremity function, decreased safety awareness.      Rehab Prognosis: Good; patient would benefit from acute skilled OT services to address these deficits and reach maximum level of function.       Plan:     Patient to be seen 3 x/week to address the above listed problems via self-care/home management, therapeutic activities, therapeutic exercises, neuromuscular re-education  Plan of Care Expires: 07/08/23  Plan of Care Reviewed with: patient    Subjective     Chief Complaint: none stated  Patient/Family Comments/goals: Return to [facility]    Occupational Profile:  Pt lives w/ wife in [facility] w/ threshold to enter. Pt has a t/s combo.   Prior Level of Function: Prior to admission, patient was independent and not working and able to drive but hasn't been . Pt's wife reports he was independent prior to 2 weeks ago and then has required total assistance for ADLs since. He was ambulating independently prior to that and began using a rolling walker (~30 ft) for ambulation in the weeks leading to hospital admission and then wasn't getting out of bed the last couple of days prior to admission.   Assistance Upon Discharge: significant other and family (spouse is able to work from home  sometimes and her sister can be there on the other days)  Equipment Used at Home: walker, rolling      Pain/Comfort:  Pain Rating 1:  (did not indicate)  Pain Rating Post-Intervention 1:  (did not indicate)    Patients cultural, spiritual, Restorationist conflicts given the current situation: no    Objective:     Communicated with: NSG prior to session.  Patient found supine with telemetry, Condom Catheter, peripheral IV upon OT entry to room.    General Precautions: Standard, fall  Orthopedic Precautions: N/A  Braces: N/A  Respiratory Status: Room air    Occupational Performance:    Bed Mobility:    Patient completed Rolling/Turning to Left with  total assistance  Patient completed Rolling/Turning to Right with total assistance    Cognitive/Visual Perceptual:  Cognitive/Psychosocial Skills:     -       Follows Commands/attention:Follows one-step commands  -     Physical Exam:  Upper Extremity Range of Motion:  -       Right Upper Extremity: WFL  -       Left Upper Extremity: WFL  Upper Extremity Strength:    -       Right Upper Extremity: 2-/5  -       Left Upper Extremity: 2-/5    AMPAC 6 Click ADL:  AMPAC Total Score: 6    Treatment & Education:  Role of OT, goals, POC    Patient left supine with all lines intact, call button in reach, bed alarm on, NSG notified, and family x 2 present    GOALS:   Multidisciplinary Problems       Occupational Therapy Goals          Problem: Occupational Therapy    Goal Priority Disciplines Outcome Interventions   Occupational Therapy Goal     OT, PT/OT Ongoing, Progressing    Description: Goals to be met by: 7/8/23     Patient will increase functional independence with ADLs by performing:    Grooming while seated with Moderate Assistance.  Sitting at edge of bed x5 minutes with Moderate Assistance.  Rolling to Right, Left with Moderate Assistance.   Supine to sit with Maximum Assistance.  Upper extremity exercise program x10 reps per handout, with assistance as needed.                          History:     Past Medical History:   Diagnosis Date    Alcoholic cirrhosis of liver with ascites     Ascites     Coagulopathy     Esophageal varices with bleeding     Hepatic encephalopathy     Mixed hyperlipidemia     Portal hypertension     Thrombocytopenia, unspecified          Past Surgical History:   Procedure Laterality Date    BICEPS TENDON REPAIR Right     femur facture Right     HERNIA REPAIR Bilateral        Time Tracking:     OT Date of Treatment: 06/17/23  OT Start Time: 1118  OT Stop Time: 1126  OT Total Time (min): 8 min    Billable Minutes:Evaluation 8    6/17/2023

## 2023-06-17 NOTE — PROGRESS NOTES
[facility]  Hepatology  Progress Note    Patient Name: Cornelio Rivers  MRN: [id number]  Admission Date: 6/15/2023  Hospital Length of Stay: 2 days    Subjective:     Interval History:  Still not great mental status. Having 3 BM/d    No current facility-administered medications on file prior to encounter.     Current Outpatient Medications on File Prior to Encounter   Medication Sig Dispense Refill    ciprofloxacin HCl (CIPRO) 500 MG tablet Take 500 mg by mouth Daily.      furosemide (LASIX) 40 MG tablet Take 40 mg by mouth 2 (two) times daily.      lactulose (CHRONULAC) 20 gram/30 mL Soln 15 ml      mirtazapine (REMERON) 7.5 MG Tab Take 7.5 mg by mouth every evening.      nadoloL (CORGARD) 40 MG tablet Take 40 mg by mouth once daily.      pantoprazole (PROTONIX) 40 MG tablet Take 40 mg by mouth once daily.      spironolactone (ALDACTONE) 100 MG tablet Take 100 mg by mouth once daily.      XIFAXAN 550 mg Tab Take 550 mg by mouth 2 (two) times daily.      meclizine (ANTIVERT) 25 mg tablet Take 25 mg by mouth daily as needed.         Review of patient's allergies indicates:  No Known Allergies      Objective:     Vitals:    06/17/23 1114   BP:    Pulse: 100   Resp:    Temp:          Constitutional:  not in acute distress and well developed  HENT: Head: Normal, normocephalic, atraumatic.  Eyes: conjunctiva clear and sclera nonicteric  Respiratory: normal chest expansion & respiratory effort   and no accessory muscle use  GI: soft, non-tender, without masses or organomegaly  Skin: normal color  Neurological: alert, disoriented    Significant Labs:  Recent Labs   Lab 06/15/23  0056 06/16/23  0617 06/17/23  0429   HGB 8.3* 7.9* 7.6*       Lab Results   Component Value Date    WBC 7.60 06/17/2023    HGB 7.6 (L) 06/17/2023    HCT 22.2 (L) 06/17/2023     (H) 06/17/2023    PLT 88 (L) 06/17/2023       Lab Results   Component Value Date     (L) 06/17/2023    K 3.5 06/17/2023    CL 94 (L) 06/17/2023     CO2 30 (H) 06/17/2023    BUN 10 06/17/2023    CREATININE 0.5 06/17/2023    CALCIUM 8.9 06/17/2023    ANIONGAP 10 06/17/2023       Lab Results   Component Value Date    ALT 57 (H) 06/17/2023    AST 81 (H) 06/17/2023    ALKPHOS 184 (H) 06/17/2023    BILITOT 11.7 (H) 06/17/2023       Lab Results   Component Value Date    INR 2.2 (H) 06/17/2023    INR 2.0 (H) 06/16/2023    INR 2.1 (H) 06/15/2023       Significant Imaging:  Reviewed pertinent radiology findings.       Assessment/Plan:     57yo PMHx decompensated EtOH cirrhosis (Dx 2020--HE, EVH--2020, asctes c/b SBP) presents as transfer from OSH 06/15 for management of decompensated cirrhosis.     Remains encephalopathic here, unable to obtain history.     Chart review demonstrates possible last drink 2 mos ago.     MELD-Na improved from 32-->26     Problem List:  decompensated EtOH cirrhosis (Dx 2020--HE, EVH--2020, asctes c/b SBP)      Recommendations:  Daily CMP/ INR  F/u full serologic work up   Continue lactulose, xifaxan  Continue home diuretic regimen  Diagnostic paracentesis, resume SBP ppx given prior history  F/u PETH  Will need to obtain proper history and addiction psych once patient less encephalopathic       Thank you for involving us in the care of Cornelio Rivers. Please call with any additional questions, concerns or changes in the patient's clinical status. We will continue to follow.     Yoni Reilly MD  Gastroenterology Fellow PGY IV   [facility]

## 2023-06-17 NOTE — SUBJECTIVE & OBJECTIVE
Interval History:  6/17: remains encephalopathic    Review of Systems   Reason unable to perform ROS: lethargy and confusion.   Objective:     Vital Signs (Most Recent):  Temp: 98.3 °F (36.8 °C) (06/17/23 1130)  Pulse: 92 (06/17/23 1130)  Resp: (!) 22 (06/17/23 1130)  BP: 101/63 (06/17/23 1130)  SpO2: 95 % (06/17/23 1130) Vital Signs (24h Range):  Temp:  [97.2 °F (36.2 °C)-98.8 °F (37.1 °C)] 98.3 °F (36.8 °C)  Pulse:  [] 92  Resp:  [15-24] 22  SpO2:  [92 %-98 %] 95 %  BP: (101-117)/(63-72) 101/63     Weight: 73.8 kg (162 lb 11.2 oz)  Body mass index is 23.34 kg/m².    Intake/Output Summary (Last 24 hours) at 6/17/2023 1420  Last data filed at 6/17/2023 1412  Gross per 24 hour   Intake 1358.96 ml   Output 1250 ml   Net 108.96 ml         Physical Exam  Constitutional:       General: He is not in acute distress.     Appearance: He is well-developed. He is ill-appearing. He is not diaphoretic.   HENT:      Head: Normocephalic and atraumatic.      Nose: Nose normal.      Mouth/Throat:      Pharynx: No oropharyngeal exudate.   Eyes:      General: No scleral icterus.     Conjunctiva/sclera: Conjunctivae normal.      Pupils: Pupils are equal, round, and reactive to light.   Neck:      Thyroid: No thyromegaly.      Vascular: No JVD.      Trachea: No tracheal deviation.   Cardiovascular:      Rate and Rhythm: Normal rate and regular rhythm.      Heart sounds: Normal heart sounds. No murmur heard.  Pulmonary:      Effort: Pulmonary effort is normal. No respiratory distress.      Breath sounds: Rhonchi present. No wheezing or rales.   Chest:      Chest wall: No tenderness.   Abdominal:      General: Bowel sounds are normal. There is distension (moderate distention with ascites).      Palpations: Abdomen is soft. There is no mass.      Tenderness: There is abdominal tenderness (mild diffuse TTP w/o guarding). There is no guarding or rebound.      Hernia: A hernia (reducilble umbilical hernia) is present.   Musculoskeletal:          General: No tenderness.      Cervical back: Normal range of motion and neck supple.      Right lower leg: Edema present.      Left lower leg: Edema present.   Lymphadenopathy:      Cervical: No cervical adenopathy.   Skin:     General: Skin is warm and dry.      Coloration: Skin is jaundiced.      Findings: No erythema or rash.   Neurological:      Mental Status: He is alert.      Cranial Nerves: No cranial nerve deficit.      Motor: No abnormal muscle tone.      Coordination: Coordination normal.      Deep Tendon Reflexes: Reflexes are normal and symmetric. Reflexes normal.      Comments: Oriented to person only, waxing and waning mental status. Able to follow simple commands and speaks few words. Limited neuro status due to unable to lack of patient cooperation.            Significant Labs: All pertinent labs within the past 24 hours have been reviewed.    Significant Imaging: I have reviewed all pertinent imaging results/findings within the past 24 hours.

## 2023-06-17 NOTE — NURSING
"Patient is more alert.  His eyes are open and are looking in the direction of voices.  While assessing Patient's temperature Patient yelled, "God dammit!"  Patient's spouse stated he ate a whole container of fruit.    "

## 2023-06-17 NOTE — NURSING
Patient is alert to self, 3L nasal cannula (SPO2 95-97%), NSR on Airstrips, and assist x 1/2.  Cipro 500 mg oral q24h for intra-abdominal.  Lasix 40 mg & Spironolactone oral daily to diurese.  Patient is able to swallow pills whole.  External Male Catheter in place. UOP 1350 ml. Last BM 6/16/23.       Latest Reference Range & Units 06/17/23 04:29   Magnesium 1.6 - 2.6 mg/dL 1.4 (L)   (L): Data is abnormally low    Team notified via secure chat.  No new orders.     Latest Reference Range & Units 06/17/23 04:29   Protime 9.0 - 12.5 sec 22.2 (H)   INR 0.8 - 1.2  2.2 (H)   (H): Data is abnormally high    Phytonadione VIT K x 3 doses given.    Lactulose changed to q6h.  Spouse and family visited and are very attentive to Patient's care.  Call light within reach.

## 2023-06-18 LAB
ALBUMIN SERPL BCP-MCNC: 2.3 G/DL (ref 3.5–5.2)
ALP SERPL-CCNC: 186 U/L (ref 55–135)
ALT SERPL W/O P-5'-P-CCNC: 59 U/L (ref 10–44)
AMMONIA PLAS-SCNC: 40 UMOL/L (ref 10–50)
ANION GAP SERPL CALC-SCNC: 11 MMOL/L (ref 8–16)
ANISOCYTOSIS BLD QL SMEAR: ABNORMAL
AST SERPL-CCNC: 86 U/L (ref 10–40)
BASO STIPL BLD QL SMEAR: ABNORMAL
BASOPHILS # BLD AUTO: 0.06 K/UL (ref 0–0.2)
BASOPHILS NFR BLD: 0.7 % (ref 0–1.9)
BILIRUB SERPL-MCNC: 13.6 MG/DL (ref 0.1–1)
BUN SERPL-MCNC: 8 MG/DL (ref 6–20)
BURR CELLS BLD QL SMEAR: ABNORMAL
CALCIUM SERPL-MCNC: 8.8 MG/DL (ref 8.7–10.5)
CERULOPLASMIN SERPL-MCNC: 16 MG/DL (ref 15–45)
CHLORIDE SERPL-SCNC: 91 MMOL/L (ref 95–110)
CO2 SERPL-SCNC: 32 MMOL/L (ref 23–29)
CREAT SERPL-MCNC: 0.6 MG/DL (ref 0.5–1.4)
DIFFERENTIAL METHOD: ABNORMAL
EOSINOPHIL # BLD AUTO: 0.4 K/UL (ref 0–0.5)
EOSINOPHIL NFR BLD: 4.9 % (ref 0–8)
ERYTHROCYTE [DISTWIDTH] IN BLOOD BY AUTOMATED COUNT: ABNORMAL % (ref 11.5–14.5)
EST. GFR  (NO RACE VARIABLE): >60 ML/MIN/1.73 M^2
GLUCOSE SERPL-MCNC: 130 MG/DL (ref 70–110)
HCT VFR BLD AUTO: 25.1 % (ref 40–54)
HGB BLD-MCNC: 8.2 G/DL (ref 14–18)
IMM GRANULOCYTES # BLD AUTO: 0.06 K/UL (ref 0–0.04)
IMM GRANULOCYTES NFR BLD AUTO: 0.7 % (ref 0–0.5)
INR PPP: 2 (ref 0.8–1.2)
IRON SERPL-MCNC: 134 UG/DL (ref 45–160)
LYMPHOCYTES # BLD AUTO: 1.6 K/UL (ref 1–4.8)
LYMPHOCYTES NFR BLD: 19.8 % (ref 18–48)
MAGNESIUM SERPL-MCNC: 1.4 MG/DL (ref 1.6–2.6)
MCH RBC QN AUTO: 36.8 PG (ref 27–31)
MCHC RBC AUTO-ENTMCNC: 32.7 G/DL (ref 32–36)
MCV RBC AUTO: 113 FL (ref 82–98)
MONOCYTES # BLD AUTO: 0.9 K/UL (ref 0.3–1)
MONOCYTES NFR BLD: 11.3 % (ref 4–15)
NEUTROPHILS # BLD AUTO: 5.1 K/UL (ref 1.8–7.7)
NEUTROPHILS NFR BLD: 62.6 % (ref 38–73)
NRBC BLD-RTO: 0 /100 WBC
OVALOCYTES BLD QL SMEAR: ABNORMAL
PHOSPHATE SERPL-MCNC: 2.8 MG/DL (ref 2.7–4.5)
PLATELET # BLD AUTO: 120 K/UL (ref 150–450)
PMV BLD AUTO: 10.1 FL (ref 9.2–12.9)
POIKILOCYTOSIS BLD QL SMEAR: SLIGHT
POLYCHROMASIA BLD QL SMEAR: ABNORMAL
POTASSIUM SERPL-SCNC: 3.5 MMOL/L (ref 3.5–5.1)
PROT SERPL-MCNC: 5.5 G/DL (ref 6–8.4)
PROTHROMBIN TIME: 21 SEC (ref 9–12.5)
RBC # BLD AUTO: 2.23 M/UL (ref 4.6–6.2)
SATURATED IRON: 96 % (ref 20–50)
SCHISTOCYTES BLD QL SMEAR: ABNORMAL
SODIUM SERPL-SCNC: 134 MMOL/L (ref 136–145)
TOTAL IRON BINDING CAPACITY: 139 UG/DL (ref 250–450)
TRANSFERRIN SERPL-MCNC: 94 MG/DL (ref 200–375)
WBC # BLD AUTO: 8.14 K/UL (ref 3.9–12.7)

## 2023-06-18 PROCEDURE — 82140 ASSAY OF AMMONIA: CPT | Mod: NTX | Performed by: HOSPITALIST

## 2023-06-18 PROCEDURE — 36415 COLL VENOUS BLD VENIPUNCTURE: CPT | Mod: NTX | Performed by: HOSPITALIST

## 2023-06-18 PROCEDURE — 85610 PROTHROMBIN TIME: CPT | Mod: NTX | Performed by: HOSPITALIST

## 2023-06-18 PROCEDURE — 82390 ASSAY OF CERULOPLASMIN: CPT | Mod: NTX | Performed by: STUDENT IN AN ORGANIZED HEALTH CARE EDUCATION/TRAINING PROGRAM

## 2023-06-18 PROCEDURE — 83735 ASSAY OF MAGNESIUM: CPT | Mod: NTX | Performed by: HOSPITALIST

## 2023-06-18 PROCEDURE — 80053 COMPREHEN METABOLIC PANEL: CPT | Mod: NTX | Performed by: HOSPITALIST

## 2023-06-18 PROCEDURE — 20600001 HC STEP DOWN PRIVATE ROOM: Mod: NTX

## 2023-06-18 PROCEDURE — 82104 ALPHA-1-ANTITRYPSIN PHENO: CPT | Mod: NTX | Performed by: STUDENT IN AN ORGANIZED HEALTH CARE EDUCATION/TRAINING PROGRAM

## 2023-06-18 PROCEDURE — 86381 MITOCHONDRIAL ANTIBODY EACH: CPT | Mod: NTX | Performed by: STUDENT IN AN ORGANIZED HEALTH CARE EDUCATION/TRAINING PROGRAM

## 2023-06-18 PROCEDURE — 87522 HEPATITIS C REVRS TRNSCRPJ: CPT | Mod: NTX | Performed by: STUDENT IN AN ORGANIZED HEALTH CARE EDUCATION/TRAINING PROGRAM

## 2023-06-18 PROCEDURE — 84466 ASSAY OF TRANSFERRIN: CPT | Mod: NTX | Performed by: STUDENT IN AN ORGANIZED HEALTH CARE EDUCATION/TRAINING PROGRAM

## 2023-06-18 PROCEDURE — 82103 ALPHA-1-ANTITRYPSIN TOTAL: CPT | Mod: NTX | Performed by: STUDENT IN AN ORGANIZED HEALTH CARE EDUCATION/TRAINING PROGRAM

## 2023-06-18 PROCEDURE — 25000003 PHARM REV CODE 250: Mod: NTX | Performed by: HOSPITALIST

## 2023-06-18 PROCEDURE — 86038 ANTINUCLEAR ANTIBODIES: CPT | Mod: NTX | Performed by: STUDENT IN AN ORGANIZED HEALTH CARE EDUCATION/TRAINING PROGRAM

## 2023-06-18 PROCEDURE — 86015 ACTIN ANTIBODY EACH: CPT | Mod: NTX | Performed by: STUDENT IN AN ORGANIZED HEALTH CARE EDUCATION/TRAINING PROGRAM

## 2023-06-18 PROCEDURE — 63600175 PHARM REV CODE 636 W HCPCS: Mod: NTX | Performed by: HOSPITALIST

## 2023-06-18 PROCEDURE — 85025 COMPLETE CBC W/AUTO DIFF WBC: CPT | Mod: NTX | Performed by: HOSPITALIST

## 2023-06-18 PROCEDURE — 99233 PR SUBSEQUENT HOSPITAL CARE,LEVL III: ICD-10-PCS | Mod: NTX,,, | Performed by: HOSPITALIST

## 2023-06-18 PROCEDURE — 84100 ASSAY OF PHOSPHORUS: CPT | Mod: NTX | Performed by: HOSPITALIST

## 2023-06-18 PROCEDURE — 99233 SBSQ HOSP IP/OBS HIGH 50: CPT | Mod: NTX,,, | Performed by: HOSPITALIST

## 2023-06-18 RX ORDER — MAGNESIUM SULFATE HEPTAHYDRATE 40 MG/ML
2 INJECTION, SOLUTION INTRAVENOUS ONCE
Status: COMPLETED | OUTPATIENT
Start: 2023-06-18 | End: 2023-06-18

## 2023-06-18 RX ORDER — LACTULOSE 10 G/15ML
200 SOLUTION ORAL; RECTAL ONCE
Status: COMPLETED | OUTPATIENT
Start: 2023-06-18 | End: 2023-06-18

## 2023-06-18 RX ORDER — MUPIROCIN 20 MG/G
OINTMENT TOPICAL 2 TIMES DAILY
Status: COMPLETED | OUTPATIENT
Start: 2023-06-18 | End: 2023-06-23

## 2023-06-18 RX ADMIN — LACTULOSE 30 G: 10 SOLUTION ORAL at 12:06

## 2023-06-18 RX ADMIN — RIFAXIMIN 550 MG: 550 TABLET ORAL at 08:06

## 2023-06-18 RX ADMIN — LACTULOSE 200 G: 10 SOLUTION ORAL at 11:06

## 2023-06-18 RX ADMIN — MUPIROCIN: 20 OINTMENT TOPICAL at 10:06

## 2023-06-18 RX ADMIN — MAGNESIUM SULFATE 2 G: 2 INJECTION INTRAVENOUS at 10:06

## 2023-06-18 RX ADMIN — LACTULOSE 30 G: 10 SOLUTION ORAL at 05:06

## 2023-06-18 RX ADMIN — MIDODRINE HYDROCHLORIDE 10 MG: 5 TABLET ORAL at 07:06

## 2023-06-18 RX ADMIN — MIDODRINE HYDROCHLORIDE 10 MG: 5 TABLET ORAL at 12:06

## 2023-06-18 RX ADMIN — RIFAXIMIN 550 MG: 550 TABLET ORAL at 10:06

## 2023-06-18 RX ADMIN — FUROSEMIDE 40 MG: 40 TABLET ORAL at 08:06

## 2023-06-18 RX ADMIN — PANTOPRAZOLE SODIUM 40 MG: 40 TABLET, DELAYED RELEASE ORAL at 08:06

## 2023-06-18 RX ADMIN — MIDODRINE HYDROCHLORIDE 10 MG: 5 TABLET ORAL at 05:06

## 2023-06-18 RX ADMIN — THIAMINE HCL TAB 100 MG 100 MG: 100 TAB at 08:06

## 2023-06-18 RX ADMIN — CIPROFLOXACIN 500 MG: 250 TABLET, COATED ORAL at 08:06

## 2023-06-18 RX ADMIN — SPIRONOLACTONE 100 MG: 50 TABLET ORAL at 08:06

## 2023-06-18 NOTE — PLAN OF CARE
Problem: Adult Inpatient Plan of Care  Goal: Plan of Care Review  Outcome: Ongoing, Progressing  Goal: Patient-Specific Goal (Individualized)  Outcome: Ongoing, Progressing  Goal: Absence of Hospital-Acquired Illness or Injury  Outcome: Ongoing, Progressing  Goal: Optimal Comfort and Wellbeing  Outcome: Ongoing, Progressing  Goal: Readiness for Transition of Care  Outcome: Ongoing, Progressing     Problem: Fall Injury Risk  Goal: Absence of Fall and Fall-Related Injury  Outcome: Ongoing, Progressing     Problem: Gas Exchange Impaired  Goal: Optimal Gas Exchange  Outcome: Ongoing, Progressing     Problem: Skin Injury Risk Increased  Goal: Skin Health and Integrity  Outcome: Ongoing, Progressing     Problem: Impaired Wound Healing  Goal: Optimal Wound Healing  Outcome: Ongoing, Progressing   EOS: NO acute changes. No bm yet.  weight shift/turn q 2. Wife at bedside.

## 2023-06-18 NOTE — NURSING
INGRID Reilly M.D. requested labs ordered 6/15/23 be collected now.  Orders placed again.  Phlebotomy called.

## 2023-06-18 NOTE — PLAN OF CARE
Problem: Adult Inpatient Plan of Care  Goal: Plan of Care Review  Outcome: Ongoing, Progressing  Goal: Patient-Specific Goal (Individualized)  Outcome: Ongoing, Progressing  Goal: Absence of Hospital-Acquired Illness or Injury  Outcome: Ongoing, Progressing  Goal: Optimal Comfort and Wellbeing  Outcome: Ongoing, Progressing  Goal: Readiness for Transition of Care  Outcome: Ongoing, Progressing     Problem: Fall Injury Risk  Goal: Absence of Fall and Fall-Related Injury  Outcome: Ongoing, Progressing     Problem: Gas Exchange Impaired  Goal: Optimal Gas Exchange  Outcome: Ongoing, Progressing

## 2023-06-18 NOTE — NURSING
"Patient is alert to self, 3L nasal cannula (SPO2 93-98%), NSR on Airstrips, and assist x 1/2. Cipro 500 mg oral q24h for intra-abdominal. X-Ray Abdomen AP 1 completed.  IR Paracentesis ordered. CT Head without contrast ordered.  Lasix 40 mg & Spironolactone oral daily to diurese.  Patient is able to swallow pills whole.  External Male Catheter in place.  ml.  Lactulose 45 ml q6h.  Last BM 6/16/23.  Patient communicated that he is not able to have a BM.  Patient stated, "I can't go."       Latest Reference Range & Units 06/18/23 05:46   Magnesium 1.6 - 2.6 mg/dL 1.4 (L)   (L): Data is abnormally low    MgSo+ 2g IVPB once given.    Spouse has remained at bedside and has displayed constant loving care, and attentiveness toward Patient.         "

## 2023-06-19 LAB
ALBUMIN FLD-MCNC: <0.4 G/DL
ALBUMIN SERPL BCP-MCNC: 2.5 G/DL (ref 3.5–5.2)
ALP SERPL-CCNC: 185 U/L (ref 55–135)
ALT SERPL W/O P-5'-P-CCNC: 69 U/L (ref 10–44)
ANA SER QL IF: NORMAL
ANION GAP SERPL CALC-SCNC: 15 MMOL/L (ref 8–16)
APPEARANCE FLD: CLEAR
AST SERPL-CCNC: 120 U/L (ref 10–40)
BACTERIA SPEC AEROBE CULT: NO GROWTH
BACTERIA SPEC ANAEROBE CULT: NORMAL
BASOPHILS # BLD AUTO: 0.06 K/UL (ref 0–0.2)
BASOPHILS NFR BLD: 0.6 % (ref 0–1.9)
BILIRUB SERPL-MCNC: 14.3 MG/DL (ref 0.1–1)
BILIRUB UR QL STRIP: NEGATIVE
BODY FLD TYPE: NORMAL
BODY FLUID SOURCE, LDH: NORMAL
BUN SERPL-MCNC: 8 MG/DL (ref 6–20)
CALCIUM SERPL-MCNC: 9.4 MG/DL (ref 8.7–10.5)
CHLORIDE SERPL-SCNC: 91 MMOL/L (ref 95–110)
CLARITY UR REFRACT.AUTO: ABNORMAL
CLINICAL BIOCHEMIST REVIEW: NORMAL
CO2 SERPL-SCNC: 26 MMOL/L (ref 23–29)
COLOR FLD: YELLOW
COLOR UR AUTO: YELLOW
CREAT SERPL-MCNC: 0.5 MG/DL (ref 0.5–1.4)
DIFFERENTIAL METHOD: ABNORMAL
EOSINOPHIL # BLD AUTO: 0.2 K/UL (ref 0–0.5)
EOSINOPHIL NFR BLD: 2.3 % (ref 0–8)
ERYTHROCYTE [DISTWIDTH] IN BLOOD BY AUTOMATED COUNT: ABNORMAL % (ref 11.5–14.5)
EST. GFR  (NO RACE VARIABLE): >60 ML/MIN/1.73 M^2
GLUCOSE SERPL-MCNC: 131 MG/DL (ref 70–110)
GLUCOSE UR QL STRIP: NEGATIVE
GRAM STN SPEC: NORMAL
GRAM STN SPEC: NORMAL
HCT VFR BLD AUTO: 24.7 % (ref 40–54)
HCV RNA SERPL QL NAA+PROBE: NOT DETECTED
HCV RNA SPEC NAA+PROBE-ACNC: <12 IU/ML
HGB BLD-MCNC: 8.3 G/DL (ref 14–18)
HGB UR QL STRIP: NEGATIVE
IMM GRANULOCYTES # BLD AUTO: 0.14 K/UL (ref 0–0.04)
IMM GRANULOCYTES NFR BLD AUTO: 1.4 % (ref 0–0.5)
INR PPP: 2.1 (ref 0.8–1.2)
KETONES UR QL STRIP: NEGATIVE
LDH FLD L TO P-CCNC: 48 U/L
LEUKOCYTE ESTERASE UR QL STRIP: NEGATIVE
LYMPHOCYTES # BLD AUTO: 1.3 K/UL (ref 1–4.8)
LYMPHOCYTES NFR BLD: 13.1 % (ref 18–48)
LYMPHOCYTES NFR FLD MANUAL: 34 %
MAGNESIUM SERPL-MCNC: 1.6 MG/DL (ref 1.6–2.6)
MCH RBC QN AUTO: 37.9 PG (ref 27–31)
MCHC RBC AUTO-ENTMCNC: 33.6 G/DL (ref 32–36)
MCV RBC AUTO: 113 FL (ref 82–98)
MESOTHL CELL NFR FLD MANUAL: 10 %
MONOCYTES # BLD AUTO: 1.2 K/UL (ref 0.3–1)
MONOCYTES NFR BLD: 12.1 % (ref 4–15)
MONOS+MACROS NFR FLD MANUAL: 45 %
NEUTROPHILS # BLD AUTO: 7.2 K/UL (ref 1.8–7.7)
NEUTROPHILS NFR BLD: 70.5 % (ref 38–73)
NEUTROPHILS NFR FLD MANUAL: 11 %
NITRITE UR QL STRIP: NEGATIVE
NRBC BLD-RTO: 0 /100 WBC
PH UR STRIP: 6 [PH] (ref 5–8)
PLATELET # BLD AUTO: 142 K/UL (ref 150–450)
PLPETH BLD-MCNC: <10 NG/ML
PMV BLD AUTO: 10.2 FL (ref 9.2–12.9)
POPETH BLD-MCNC: <10 NG/ML
POTASSIUM SERPL-SCNC: 4.3 MMOL/L (ref 3.5–5.1)
PROT FLD-MCNC: <1 G/DL
PROT SERPL-MCNC: 6.5 G/DL (ref 6–8.4)
PROT UR QL STRIP: NEGATIVE
PROTHROMBIN TIME: 21.5 SEC (ref 9–12.5)
RBC # BLD AUTO: 2.19 M/UL (ref 4.6–6.2)
SODIUM SERPL-SCNC: 132 MMOL/L (ref 136–145)
SP GR UR STRIP: 1.01 (ref 1–1.03)
SPECIMEN SOURCE: NORMAL
SPECIMEN SOURCE: NORMAL
URN SPEC COLLECT METH UR: ABNORMAL
WBC # BLD AUTO: 10.16 K/UL (ref 3.9–12.7)
WBC # FLD: 15 /CU MM

## 2023-06-19 PROCEDURE — 93010 EKG 12-LEAD: ICD-10-PCS | Mod: NTX,,, | Performed by: INTERNAL MEDICINE

## 2023-06-19 PROCEDURE — 83615 LACTATE (LD) (LDH) ENZYME: CPT | Mod: NTX | Performed by: HOSPITALIST

## 2023-06-19 PROCEDURE — 20600001 HC STEP DOWN PRIVATE ROOM: Mod: NTX

## 2023-06-19 PROCEDURE — 93005 ELECTROCARDIOGRAM TRACING: CPT | Mod: NTX

## 2023-06-19 PROCEDURE — 36415 COLL VENOUS BLD VENIPUNCTURE: CPT | Mod: NTX | Performed by: HOSPITALIST

## 2023-06-19 PROCEDURE — 85025 COMPLETE CBC W/AUTO DIFF WBC: CPT | Mod: NTX | Performed by: HOSPITALIST

## 2023-06-19 PROCEDURE — 63600175 PHARM REV CODE 636 W HCPCS: Mod: NTX | Performed by: HOSPITALIST

## 2023-06-19 PROCEDURE — 87040 BLOOD CULTURE FOR BACTERIA: CPT | Mod: 59,NTX | Performed by: HOSPITALIST

## 2023-06-19 PROCEDURE — 80053 COMPREHEN METABOLIC PANEL: CPT | Mod: NTX | Performed by: HOSPITALIST

## 2023-06-19 PROCEDURE — 93010 ELECTROCARDIOGRAM REPORT: CPT | Mod: NTX,,, | Performed by: INTERNAL MEDICINE

## 2023-06-19 PROCEDURE — 89051 BODY FLUID CELL COUNT: CPT | Mod: NTX | Performed by: HOSPITALIST

## 2023-06-19 PROCEDURE — 99900035 HC TECH TIME PER 15 MIN (STAT): Mod: NTX

## 2023-06-19 PROCEDURE — 82042 OTHER SOURCE ALBUMIN QUAN EA: CPT | Mod: NTX | Performed by: HOSPITALIST

## 2023-06-19 PROCEDURE — 25000003 PHARM REV CODE 250: Mod: NTX | Performed by: HOSPITALIST

## 2023-06-19 PROCEDURE — 25000003 PHARM REV CODE 250: Mod: NTX | Performed by: FAMILY MEDICINE

## 2023-06-19 PROCEDURE — 85610 PROTHROMBIN TIME: CPT | Mod: NTX | Performed by: HOSPITALIST

## 2023-06-19 PROCEDURE — 99233 PR SUBSEQUENT HOSPITAL CARE,LEVL III: ICD-10-PCS | Mod: NTX,,, | Performed by: HOSPITALIST

## 2023-06-19 PROCEDURE — P9047 ALBUMIN (HUMAN), 25%, 50ML: HCPCS | Mod: JZ,JG,NTX | Performed by: HOSPITALIST

## 2023-06-19 PROCEDURE — 27000221 HC OXYGEN, UP TO 24 HOURS: Mod: NTX

## 2023-06-19 PROCEDURE — 81003 URINALYSIS AUTO W/O SCOPE: CPT | Mod: NTX | Performed by: HOSPITALIST

## 2023-06-19 PROCEDURE — 84157 ASSAY OF PROTEIN OTHER: CPT | Mod: NTX | Performed by: HOSPITALIST

## 2023-06-19 PROCEDURE — 99233 SBSQ HOSP IP/OBS HIGH 50: CPT | Mod: NTX,,, | Performed by: HOSPITALIST

## 2023-06-19 PROCEDURE — 87075 CULTR BACTERIA EXCEPT BLOOD: CPT | Mod: NTX | Performed by: HOSPITALIST

## 2023-06-19 PROCEDURE — 97530 THERAPEUTIC ACTIVITIES: CPT | Mod: NTX,CQ

## 2023-06-19 PROCEDURE — 87070 CULTURE OTHR SPECIMN AEROBIC: CPT | Mod: NTX | Performed by: HOSPITALIST

## 2023-06-19 PROCEDURE — 83735 ASSAY OF MAGNESIUM: CPT | Mod: NTX | Performed by: HOSPITALIST

## 2023-06-19 PROCEDURE — 87205 SMEAR GRAM STAIN: CPT | Mod: NTX | Performed by: HOSPITALIST

## 2023-06-19 RX ORDER — LACTULOSE 10 G/15ML
200 SOLUTION ORAL; RECTAL ONCE
Status: COMPLETED | OUTPATIENT
Start: 2023-06-19 | End: 2023-06-19

## 2023-06-19 RX ORDER — ALBUMIN HUMAN 250 G/1000ML
25 SOLUTION INTRAVENOUS EVERY 12 HOURS
Status: DISCONTINUED | OUTPATIENT
Start: 2023-06-19 | End: 2023-06-20

## 2023-06-19 RX ORDER — LIDOCAINE HYDROCHLORIDE 10 MG/ML
INJECTION INFILTRATION; PERINEURAL
Status: COMPLETED | OUTPATIENT
Start: 2023-06-19 | End: 2023-06-19

## 2023-06-19 RX ORDER — MIDODRINE HYDROCHLORIDE 5 MG/1
15 TABLET ORAL
Status: DISCONTINUED | OUTPATIENT
Start: 2023-06-20 | End: 2023-06-28 | Stop reason: HOSPADM

## 2023-06-19 RX ADMIN — SPIRONOLACTONE 100 MG: 50 TABLET ORAL at 10:06

## 2023-06-19 RX ADMIN — MUPIROCIN: 20 OINTMENT TOPICAL at 10:06

## 2023-06-19 RX ADMIN — FUROSEMIDE 40 MG: 40 TABLET ORAL at 10:06

## 2023-06-19 RX ADMIN — ALBUMIN (HUMAN) 25 G: 12.5 SOLUTION INTRAVENOUS at 04:06

## 2023-06-19 RX ADMIN — LACTULOSE 30 G: 10 SOLUTION ORAL at 12:06

## 2023-06-19 RX ADMIN — LACTULOSE 30 G: 10 SOLUTION ORAL at 05:06

## 2023-06-19 RX ADMIN — THIAMINE HYDROCHLORIDE 500 MG: 100 INJECTION, SOLUTION INTRAMUSCULAR; INTRAVENOUS at 03:06

## 2023-06-19 RX ADMIN — MIDODRINE HYDROCHLORIDE 10 MG: 5 TABLET ORAL at 03:06

## 2023-06-19 RX ADMIN — THIAMINE HYDROCHLORIDE 500 MG: 100 INJECTION, SOLUTION INTRAMUSCULAR; INTRAVENOUS at 11:06

## 2023-06-19 RX ADMIN — PANTOPRAZOLE SODIUM 40 MG: 40 TABLET, DELAYED RELEASE ORAL at 10:06

## 2023-06-19 RX ADMIN — RIFAXIMIN 550 MG: 550 TABLET ORAL at 10:06

## 2023-06-19 RX ADMIN — VANCOMYCIN HYDROCHLORIDE 2000 MG: 500 INJECTION, POWDER, LYOPHILIZED, FOR SOLUTION INTRAVENOUS at 05:06

## 2023-06-19 RX ADMIN — PIPERACILLIN SODIUM AND TAZOBACTAM SODIUM 4.5 G: 4; .5 INJECTION, POWDER, LYOPHILIZED, FOR SOLUTION INTRAVENOUS at 08:06

## 2023-06-19 RX ADMIN — LACTULOSE 200 G: 10 SOLUTION ORAL at 04:06

## 2023-06-19 RX ADMIN — MIDODRINE HYDROCHLORIDE 10 MG: 5 TABLET ORAL at 10:06

## 2023-06-19 RX ADMIN — MUPIROCIN: 20 OINTMENT TOPICAL at 08:06

## 2023-06-19 RX ADMIN — LACTULOSE 30 G: 10 SOLUTION ORAL at 06:06

## 2023-06-19 RX ADMIN — LIDOCAINE HYDROCHLORIDE 5 ML: 10 INJECTION, SOLUTION INFILTRATION; PERINEURAL at 09:06

## 2023-06-19 RX ADMIN — CIPROFLOXACIN 500 MG: 250 TABLET, COATED ORAL at 10:06

## 2023-06-19 RX ADMIN — THIAMINE HYDROCHLORIDE 500 MG: 100 INJECTION, SOLUTION INTRAMUSCULAR; INTRAVENOUS at 08:06

## 2023-06-19 NOTE — PROCEDURES
Radiology Post-Procedure Note    Pre Op Diagnosis: Ascites  Post Op Diagnosis: Same    Procedure: Ultrasound Guided Paracentesis    Procedure performed by: Princess Quiñonez NP     Written Informed Consent Obtained: Yes  Specimen Removed: YES serous  Estimated Blood Loss: Minimal    Findings:   Successful paracentesis.  Albumin administered PRN per protocol.    Patient tolerated procedure well.    Princess Quiñonez, APRN, FNP  Interventional Radiology  [phone] clinic

## 2023-06-19 NOTE — PROGRESS NOTES
[facility] - Int[facility] Care ([address] Medicine  Progress Note    Patient Name: Cornelio Rivers  MRN: [id number]  Patient Class: IP- Inpatient   Admission Date: 6/15/2023  Length of Stay: 4 days  Attending Physician: Alexsandra Menendez MD  Primary Care Provider: Primary Doctor No        Subjective:     Principal Problem:Decompensated hepatic cirrhosis        HPI:  Cornelio Rivers is a 58-year-old male with a history of alcoholic cirrhosis diagnosed in 2020 complicated by portal hypertension, bleeding esophageal varices s/p banding in 2020, ascites, thrombocytopenia, coagulopathy, hepatic encephalopathy, SBP, alcohol use, and hyperlipidemia who presents as a transfer from [facility] for management of decompensated liver cirrhosis. Patient was admitted to [facility] on June 2 with altered mental status, increasing weakness, low blood pressure, poor appetite, and possible pneumonia.  He had ammonia level 102 and a MELD score 32 (sodium 118, INR 2.2, total bilirubin 16.3, creatinine 1.02). Chest x-ray on admission had mild interstitial edema versus pneumonitis with findings suggestive of developing airspace disease or atelectasis in the right chest.  Initially he had hypotension with concern for septic shock and was treated in the ICU with pressors.  He was treated with broad-spectrum antibiotics.  Hemoglobin decreased during his stay and he received packed red blood cells, but he did not have signs of GI bleeding.  INR increased during his stay and he received vitamin K/FFP.  He gradually weaned off pressors and was transferred out of the ICU on June 8.  Mental status has not improved, and he remains intermittently lethargic.  Bilirubin was stable to slightly improved, but INR has worsened.  He was empirically started on Rocephin for SBP prophylaxis (no paracentesis with elevated INR//blood cultures with no growth so far).  He has persistent abdominal distention.  Current medications  include Xifaxan and lactulose, Protonix, midodrine, ceftriaxone and Lasix/Aldactone.  Last alcohol use was noted to be about 2 months ago. Current MELD score 29.  Referring team spoke with Hepatology at [facility] with plan to transfer for further evaluation.   Current diagnoses include hepatic encephalopathy, alcoholic hepatitis, coagulopathy, and anemia.     June 13: Sodium 141, potassium 3.4, chloride 105 CO2 30, BUN 11, creatinine 0.51, glucose 103, total bilirubin 13.8, , ALT 75, INR 3, white blood cells 8.3, hemoglobin 8.4, hematocrit 25.5, platelets 89     June 12: Sodium 142, potassium 3, chloride 106, CO2 29, BUN 10, creatinine 0.59, glucose 125, calcium 9.5, magnesium 1.64, bilirubin 13.5, , ALT 78, white blood cells 7.4, hemoglobin 8.3, hematocrit 25.5, platelets 109, INR 2.9     June 9:  Right upper extremity Doppler venous ultrasound had no DVT  -chest x-ray had stable patchy bilateral pulmonary infiltrates.     June 2: COVID negative, influenza negative  -gallbladder ultrasound noted moderate volume ascites.  Thick-walled gallbladder seen, nonspecific.  Internal gallbladder sludge.  Chest x-ray had mild interstitial edema or pneumonitis with findings suggestive of developing airspace disease or atelectasis in the right chest.  -CT head had no evidence of acute intracranial hemorrhage.  Some microvascular disease is present.     February 16, 2023: EGD had no significant recurrence of varices in the esophagus.  Moderate portal hypertensive gastropathy with friable mucosa.    During my interview upon arrival to [facility], patient with waxing and waning mental status. He open eyes upon calling name, speaks few words but then falls back to sleep. He is oriented to person only as he was able to tell his name and his wife's name correctly who is present at bedside. He is able to follow simple commands like open his mouth upon asking. Wife states patient had a long history of alcohol use prior to  2020 when he developed acute esophageal variceal bleeding and diagnosed with alcoholic liver cirrhosis. He has been following with local GI doctor Dr. Gallegos in [place] for cirrhosis. His last hospital admission was in January of this year when he was admitted with SBP and had 5 liter paracentesis. He was discharged home on course of prednisone and ciprofloxacin at that time. Wife states patient cut down alcohol since 2020 and his last alcohol use about 2-3 months ago. Wife noticed overall declining about 2 weeks prior to this hospital admission as he was getting more weak, confused, lethargy, losing weight and muscle mass, decreased appetite. On June 2nd wife noticed his blood pressure to be low in 70s when she drove him to [facility]. Denies tobacco, IVDU or other illicit drug use. He worked as a .          Overview/Hospital Course:  No notes on file    Interval History:   6/19: notified temp of 97 and . Added cultures, broad spectrum ABX. Will follow up paracentesis results.     Review of Systems   Reason unable to perform ROS: lethargy and confusion.   Objective:     Vital Signs (Most Recent):  Temp: 97.4 °F (36.3 °C) (06/19/23 1536)  Pulse: 104 (06/19/23 1536)  Resp: 18 (06/19/23 1536)  BP: (!) 87/53 (06/19/23 1536)  SpO2: (!) 94 % (06/19/23 1536) Vital Signs (24h Range):  Temp:  [97.4 °F (36.3 °C)-98.6 °F (37 °C)] 97.4 °F (36.3 °C)  Pulse:  [104-127] 104  Resp:  [16-24] 18  SpO2:  [92 %-98 %] 94 %  BP: ()/(51-76) 87/53     Weight: 85.7 kg (188 lb 15 oz)  Body mass index is 27.11 kg/m².    Intake/Output Summary (Last 24 hours) at 6/19/2023 1555  Last data filed at 6/19/2023 1501  Gross per 24 hour   Intake 720 ml   Output 5350 ml   Net -4630 ml         Physical Exam  Constitutional:       General: He is not in acute distress.     Appearance: He is well-developed. He is ill-appearing. He is not diaphoretic.   HENT:      Head: Normocephalic and atraumatic.      Nose: Nose  normal.      Mouth/Throat:      Pharynx: No oropharyngeal exudate.   Eyes:      General: No scleral icterus.     Conjunctiva/sclera: Conjunctivae normal.      Pupils: Pupils are equal, round, and reactive to light.   Neck:      Thyroid: No thyromegaly.      Vascular: No JVD.      Trachea: No tracheal deviation.   Cardiovascular:      Rate and Rhythm: Normal rate and regular rhythm.      Heart sounds: Normal heart sounds. No murmur heard.  Pulmonary:      Effort: Pulmonary effort is normal. No respiratory distress.      Breath sounds: Rhonchi present. No wheezing or rales.   Chest:      Chest wall: No tenderness.   Abdominal:      General: Bowel sounds are normal. There is distension (moderate distention with ascites).      Palpations: Abdomen is soft. There is no mass.      Tenderness: There is abdominal tenderness (mild diffuse TTP w/o guarding). There is no guarding or rebound.      Hernia: A hernia (reducilble umbilical hernia) is present.   Musculoskeletal:         General: No tenderness.      Cervical back: Normal range of motion and neck supple.      Right lower leg: Edema present.      Left lower leg: Edema present.   Lymphadenopathy:      Cervical: No cervical adenopathy.   Skin:     General: Skin is warm and dry.      Coloration: Skin is jaundiced.      Findings: No erythema or rash.   Neurological:      Mental Status: He is alert.      Cranial Nerves: No cranial nerve deficit.      Motor: No abnormal muscle tone.      Coordination: Coordination normal.      Deep Tendon Reflexes: Reflexes are normal and symmetric. Reflexes normal.      Comments: Oriented to person only, waxing and waning mental status. Able to follow simple commands and speaks few words. Limited neuro status due to unable to lack of patient cooperation.            Significant Labs: All pertinent labs within the past 24 hours have been reviewed.    Significant Imaging: I have reviewed all pertinent imaging results/findings within the past 24  hours.      Assessment/Plan:      * Decompensated hepatic cirrhosis  Alcoholic liver cirrhosis with ascites   Portal hypertension   Esophageal varices w/o bleeding   Hepatic encephalopathy   Coagulopathy   Thrombocytopenia     -admitted to [facility] ICU on 6/02 for presumed septic shock and hepatic encephalopathy (ammonia 102)  -treated with pressors, broad spectrum antibiotics and lactulose with some improvement of clinical status      MELD-Na: 24 at 6/16/2023  6:17 AM  MELD: 24 at 6/16/2023  6:17 AM  Calculated from:  Serum Creatinine: 0.6 mg/dL (Using min of 1 mg/dL) at 6/16/2023  6:17 AM  Serum Sodium: 139 mmol/L (Using max of 137 mmol/L) at 6/16/2023  6:17 AM  Total Bilirubin: 11.8 mg/dL at 6/16/2023  6:17 AM  INR(ratio): 2.0 at 6/16/2023  6:17 AM  -no signs of active bleeding or overt sepsis   -did not have paracentesis at outside hospital due to elevated INR, but empirically started on rocephin for SBP PPX  - paracentesis on 6/16 no SBP   -continue lactulose and rifaximin for hepatic encephalopathy (ammonia improved to 40)  -continue diuretics lasix and aldactone for ascites   -will check echo given elevated BNP and possible pleural effusion on CXR   -check HIV, hep panel, PETH  -continue midodrine for borderline low BP   -consider to restart nadolol for portal hypertension if BP improves   -continue protonix daily   -monitor MELD closely with daily labs   -consult hepatology         Delirium  -waxing and waning mental status   -due to decomp liver cirrhosis and prolonged hospital stay   -delirium precautions       Physical debility  -due to liver cirrhosis and prolonged hospital stay for 2 weeks   -PT evaluation and treat       Hypomagnesemia  -likely from diuretic use   -will replace with MgSO4      Hypokalemia  -due to diuretic use   -will replace with KCL IVPB X 2      Coagulopathy  -INR 2.1  -due to decomp liver cirrhosis   -no signs of bleeding   -received FFP and vitamin K at outside  hospital   -continue vitamin K daily and monitor INR       Thrombocytopenia  -platelet today 133  -due to portal hypertension and splenic sequestration   -monitor trend       Hepatic encephalopathy  -improved with lactulose and rifaximin   -ammonia improved from 102 to 40  -titrate lactulose to 3-4 BMs/day       Secondary esophageal varices without bleeding  -had initial episode of bleeding in 2020 treated with banding   -no further bleeding episode since then per wife   -follows with local GI. Dr. Gallegos   -last EGD in February 2023 showed no bleeding from varices, but had portal hypertensive gastropathy   -continue protonix daily       Portal hypertension  -see above under decomp cirrhosis       Alcoholic cirrhosis of liver with ascites  -see above   -cut down alcohol in 2020 when diagnosed with esophageal varices and cirrhosis   -last alcohol use 2-3 months ago per wife   -check [facility]           VTE Risk Mitigation (From admission, onward)         Ordered     IP VTE LOW RISK PATIENT  Once         06/15/23 0148     Place sequential compression device  Until discontinued         06/15/23 0148                Discharge Planning   NICKOLAS: 6/25/2023     Code Status: Full Code   Is the patient medically ready for discharge?: No    Reason for patient still in hospital (select all that apply): Patient trending condition  Discharge Plan A: Home with family                  Alexsandra Menendez MD  Department of Hospital Medicine   [facility] - Intensive Care ([place]-14)

## 2023-06-19 NOTE — NURSING
Patient is alert to self, 3L nasal cannula (SPO2 93-98%), NSR on Airstrips, and assist x 1/2.  Spiritual consult ordered. IR Paracentesis complete, 4.7L removed. CT Head without contrast, EKG, and X-Ray Chest AP portable completed.  Albumin given once. Cipro 500 mg oral q24h for intra-abdominal discontinued.  Vancomycin 2g IVPB once given. Zosyn 4.5 g, first dose ordered. Thiamine 500 mg IVPB given. Lasix 40 mg & Spironolactone oral daily to diurese.  Patient is able to swallow pills whole, but administration was difficult.  Patient tried to spit pills out, clenched teeth to prevent pills from going into his mouth, and held pills in his mouth refusing to swallow.  External Male Catheter in place.  ml.  Lactulose 45 ml q6h.  Last BM 6/19/23 (rectum) x 2.  Fecal Management System removed. Patient cleaned, linen and gown changed.      Spouse has remained at bedside and has been an active, loving participant in all Patient care.

## 2023-06-19 NOTE — PROGRESS NOTES
[facility] - Intensive Care ([address] Medicine  Progress Note    Patient Name: Cornelio Rivers  MRN: [id number]  Patient Class: IP- Inpatient   Admission Date: 6/15/2023  Length of Stay: 4 days  Attending Physician: Alexsandra Menendez MD  Primary Care Provider: Primary Doctor No        Subjective:     Principal Problem:Decompensated hepatic cirrhosis        HPI:  Cornelio Rivers is a 58-year-old male with a history of alcoholic cirrhosis diagnosed in 2020 complicated by portal hypertension, bleeding esophageal varices s/p banding in 2020, ascites, thrombocytopenia, coagulopathy, hepatic encephalopathy, SBP, alcohol use, and hyperlipidemia who presents as a transfer from [facility] for management of decompensated liver cirrhosis. Patient was admitted to [facility] on June 2 with altered mental status, increasing weakness, low blood pressure, poor appetite, and possible pneumonia.  He had ammonia level 102 and a MELD score 32 (sodium 118, INR 2.2, total bilirubin 16.3, creatinine 1.02). Chest x-ray on admission had mild interstitial edema versus pneumonitis with findings suggestive of developing airspace disease or atelectasis in the right chest.  Initially he had hypotension with concern for septic shock and was treated in the ICU with pressors.  He was treated with broad-spectrum antibiotics.  Hemoglobin decreased during his stay and he received packed red blood cells, but he did not have signs of GI bleeding.  INR increased during his stay and he received vitamin K/FFP.  He gradually weaned off pressors and was transferred out of the ICU on June 8.  Mental status has not improved, and he remains intermittently lethargic.  Bilirubin was stable to slightly improved, but INR has worsened.  He was empirically started on Rocephin for SBP prophylaxis (no paracentesis with elevated INR//blood cultures with no growth so far).  He has persistent abdominal distention.  Current medications  include Xifaxan and lactulose, Protonix, midodrine, ceftriaxone and Lasix/Aldactone.  Last alcohol use was noted to be about 2 months ago. Current MELD score 29.  Referring team spoke with Hepatology at [facility] with plan to transfer for further evaluation.   Current diagnoses include hepatic encephalopathy, alcoholic hepatitis, coagulopathy, and anemia.     June 13: Sodium 141, potassium 3.4, chloride 105 CO2 30, BUN 11, creatinine 0.51, glucose 103, total bilirubin 13.8, , ALT 75, INR 3, white blood cells 8.3, hemoglobin 8.4, hematocrit 25.5, platelets 89     June 12: Sodium 142, potassium 3, chloride 106, CO2 29, BUN 10, creatinine 0.59, glucose 125, calcium 9.5, magnesium 1.64, bilirubin 13.5, , ALT 78, white blood cells 7.4, hemoglobin 8.3, hematocrit 25.5, platelets 109, INR 2.9     June 9:  Right upper extremity Doppler venous ultrasound had no DVT  -chest x-ray had stable patchy bilateral pulmonary infiltrates.     June 2: COVID negative, influenza negative  -gallbladder ultrasound noted moderate volume ascites.  Thick-walled gallbladder seen, nonspecific.  Internal gallbladder sludge.  Chest x-ray had mild interstitial edema or pneumonitis with findings suggestive of developing airspace disease or atelectasis in the right chest.  -CT head had no evidence of acute intracranial hemorrhage.  Some microvascular disease is present.     February 16, 2023: EGD had no significant recurrence of varices in the esophagus.  Moderate portal hypertensive gastropathy with friable mucosa.    During my interview upon arrival to [facility], patient with waxing and waning mental status. He open eyes upon calling name, speaks few words but then falls back to sleep. He is oriented to person only as he was able to tell his name and his wife's name correctly who is present at bedside. He is able to follow simple commands like open his mouth upon asking. Wife states patient had a long history of alcohol use prior to  2020 when he developed acute esophageal variceal bleeding and diagnosed with alcoholic liver cirrhosis. He has been following with local GI doctor Dr. Gallegos in [place] for cirrhosis. His last hospital admission was in January of this year when he was admitted with SBP and had 5 liter paracentesis. He was discharged home on course of prednisone and ciprofloxacin at that time. Wife states patient cut down alcohol since 2020 and his last alcohol use about 2-3 months ago. Wife noticed overall declining about 2 weeks prior to this hospital admission as he was getting more weak, confused, lethargy, losing weight and muscle mass, decreased appetite. On June 2nd wife noticed his blood pressure to be low in 70s when she drove him to [facility]. Denies tobacco, IVDU or other illicit drug use. He worked as a .          Overview/Hospital Course:  No notes on file    Interval History:   6/18: remains confused    Review of Systems   Reason unable to perform ROS: lethargy and confusion.   Objective:     Vital Signs (Most Recent):  Temp: 97.4 °F (36.3 °C) (06/19/23 1536)  Pulse: 104 (06/19/23 1536)  Resp: 18 (06/19/23 1536)  BP: (!) 87/53 (06/19/23 1536)  SpO2: (!) 94 % (06/19/23 1536) Vital Signs (24h Range):  Temp:  [97.4 °F (36.3 °C)-98.6 °F (37 °C)] 97.4 °F (36.3 °C)  Pulse:  [104-127] 104  Resp:  [16-24] 18  SpO2:  [92 %-98 %] 94 %  BP: ()/(51-76) 87/53     Weight: 85.7 kg (188 lb 15 oz)  Body mass index is 27.11 kg/m².    Intake/Output Summary (Last 24 hours) at 6/19/2023 1610  Last data filed at 6/19/2023 1501  Gross per 24 hour   Intake 720 ml   Output 5350 ml   Net -4630 ml         Physical Exam  Constitutional:       General: He is not in acute distress.     Appearance: He is well-developed. He is ill-appearing. He is not diaphoretic.   HENT:      Head: Normocephalic and atraumatic.      Nose: Nose normal.      Mouth/Throat:      Pharynx: No oropharyngeal exudate.   Eyes:      General: No  scleral icterus.     Conjunctiva/sclera: Conjunctivae normal.      Pupils: Pupils are equal, round, and reactive to light.   Neck:      Thyroid: No thyromegaly.      Vascular: No JVD.      Trachea: No tracheal deviation.   Cardiovascular:      Rate and Rhythm: Normal rate and regular rhythm.      Heart sounds: Normal heart sounds. No murmur heard.  Pulmonary:      Effort: Pulmonary effort is normal. No respiratory distress.      Breath sounds: Rhonchi present. No wheezing or rales.   Chest:      Chest wall: No tenderness.   Abdominal:      General: Bowel sounds are normal. There is distension (moderate distention with ascites).      Palpations: Abdomen is soft. There is no mass.      Tenderness: There is abdominal tenderness (mild diffuse TTP w/o guarding). There is no guarding or rebound.      Hernia: A hernia (reducilble umbilical hernia) is present.   Musculoskeletal:         General: No tenderness.      Cervical back: Normal range of motion and neck supple.      Right lower leg: Edema present.      Left lower leg: Edema present.   Lymphadenopathy:      Cervical: No cervical adenopathy.   Skin:     General: Skin is warm and dry.      Coloration: Skin is jaundiced.      Findings: No erythema or rash.   Neurological:      Mental Status: He is alert.      Cranial Nerves: No cranial nerve deficit.      Motor: No abnormal muscle tone.      Coordination: Coordination normal.      Deep Tendon Reflexes: Reflexes are normal and symmetric. Reflexes normal.      Comments: Oriented to person only, waxing and waning mental status. Able to follow simple commands and speaks few words. Limited neuro status due to unable to lack of patient cooperation.            Significant Labs: All pertinent labs within the past 24 hours have been reviewed.    Significant Imaging: I have reviewed all pertinent imaging results/findings within the past 24 hours.      Assessment/Plan:      * Decompensated hepatic cirrhosis  Alcoholic liver  cirrhosis with ascites   Portal hypertension   Esophageal varices w/o bleeding   Hepatic encephalopathy   Coagulopathy   Thrombocytopenia     -admitted to [facility] ICU on 6/02 for presumed septic shock and hepatic encephalopathy (ammonia 102)  -treated with pressors, broad spectrum antibiotics and lactulose with some improvement of clinical status      MELD-Na: 24 at 6/16/2023  6:17 AM  MELD: 24 at 6/16/2023  6:17 AM  Calculated from:  Serum Creatinine: 0.6 mg/dL (Using min of 1 mg/dL) at 6/16/2023  6:17 AM  Serum Sodium: 139 mmol/L (Using max of 137 mmol/L) at 6/16/2023  6:17 AM  Total Bilirubin: 11.8 mg/dL at 6/16/2023  6:17 AM  INR(ratio): 2.0 at 6/16/2023  6:17 AM  -no signs of active bleeding or overt sepsis   -did not have paracentesis at outside hospital due to elevated INR, but empirically started on rocephin for SBP PPX  - paracentesis on 6/16 no SBP   -continue lactulose and rifaximin for hepatic encephalopathy (ammonia improved to 40)  -continue diuretics lasix and aldactone for ascites   -will check echo given elevated BNP and possible pleural effusion on CXR   -check HIV, hep panel, PETH  -continue midodrine for borderline low BP   -consider to restart nadolol for portal hypertension if BP improves   -continue protonix daily   -monitor MELD closely with daily labs   -consult hepatology         Delirium  -waxing and waning mental status   -due to decomp liver cirrhosis and prolonged hospital stay   -delirium precautions       Physical debility  -due to liver cirrhosis and prolonged hospital stay for 2 weeks   -PT evaluation and treat       Hypomagnesemia  -likely from diuretic use   -will replace with MgSO4      Hypokalemia  -due to diuretic use   -will replace with KCL IVPB X 2      Coagulopathy  -INR 2.1  -due to decomp liver cirrhosis   -no signs of bleeding   -received FFP and vitamin K at outside hospital   -continue vitamin K daily and monitor INR       Thrombocytopenia  -platelet  today 133  -due to portal hypertension and splenic sequestration   -monitor trend       Hepatic encephalopathy  -improved with lactulose and rifaximin   -ammonia improved from 102 to 40  -titrate lactulose to 3-4 BMs/day       Secondary esophageal varices without bleeding  -had initial episode of bleeding in 2020 treated with banding   -no further bleeding episode since then per wife   -follows with local GI. Dr. Gallegos   -last EGD in February 2023 showed no bleeding from varices, but had portal hypertensive gastropathy   -continue protonix daily       Portal hypertension  -see above under decomp cirrhosis       Alcoholic cirrhosis of liver with ascites  -see above   -cut down alcohol in 2020 when diagnosed with esophageal varices and cirrhosis   -last alcohol use 2-3 months ago per wife   -check [facility]           VTE Risk Mitigation (From admission, onward)         Ordered     IP VTE LOW RISK PATIENT  Once         06/15/23 0148     Place sequential compression device  Until discontinued         06/15/23 0148                Discharge Planning   NICKOLAS: 6/25/2023     Code Status: Full Code   Is the patient medically ready for discharge?: No    Reason for patient still in hospital (select all that apply): Patient trending condition  Discharge Plan A: Home with family                  Alexsandra Menendez MD  Department of Hospital Medicine   [facility] - Intensive Care ([place]-[facility])

## 2023-06-19 NOTE — PLAN OF CARE
Pt arrived to MPU room 1 for para, no acute distress noted. Orders and labs reviewed on chart. Wife at bedside. Awaiting consent.

## 2023-06-19 NOTE — PLAN OF CARE
Problem: Adult Inpatient Plan of Care  Goal: Plan of Care Review  Outcome: Ongoing, Progressing  Flowsheets (Taken 6/19/2023 2268)  Plan of Care Reviewed With:   patient   spouse  Goal: Optimal Comfort and Wellbeing  Outcome: Ongoing, Progressing     Problem: Fall Injury Risk  Goal: Absence of Fall and Fall-Related Injury  Outcome: Ongoing, Progressing     Problem: Skin Injury Risk Increased  Goal: Skin Health and Integrity  Outcome: Ongoing, Progressing   POC reviewed with patient and spouse at bedside, understanding verbalized by spouse. Pt AAOX1, VSS. On 2L NC. Pt with distended abd from Ascites. Continuous cardiac monitoring in place. Pt was unable to tolerate ordered rectal lactulose. Rectal tube placed per order, pt tolerated well. Fall and aspiration precautions maintained. Bed locked and in lowest position; side rails up and locked X3. Bed alarm set and audible; call light and personal belongings within reach. All questions and concerns addressed. See flow sheet for full assessment and V/S info. Spouse instructed to call for assistance, verbalized understanding. Plan for Paracentesis and CT of head  today 6/19.

## 2023-06-19 NOTE — PLAN OF CARE
06/19/23 1130   Discharge Reassessment   Assessment Type Discharge Planning Reassessment   Discharge Plan discussed with: Spouse/sig other   Name(s) and Number(s) Rogers Rivers (Spouse)   [phone] ([place]   Discharge Plan A Skilled Nursing Facility   Discharge Plan B Home Health   DME Needed Upon Discharge  other (see comments)  (TBD)   Why the patient remains in the hospital Requires continued medical care   Post-Acute Status   Post-Acute Authorization Placement   Post-Acute Placement Status Referrals Sent   Discharge Delays None known at this time     CM met with wife at bedside and patient not responding to calling of name, eyes open and wax and wanes.  CM informed wife that therapy recommended SNF. Patients wife asked for SNF in [place] near [facility].  CM listen to wife and wife verbalized concerns and tearful during conversation. CM  encouraged wife to take breaks and allow time to reflect on her thoughts and give her time for self.  CM will courtesy consult for spiritual session. Wife is in agreement with courtesy spiritual consult and sending referrals to SNF.    CM sent multiple referrals to the following'  [facility] Phone: [phone]    [facility] Phone: [phone]    [facility] Phone: [phone]    [facility] Phone: [phone]   [facility] Phone: [phone]    [facility] Phone: [phone]  [facility] Phone: [phone]  [facility] Phone: [phone]    [facility] Phone: [phone]    [facility] Phone: [phone]      CM informed wife that placement to SNF maybe denied because of the patients age. And will need a plan B [palliative care] .      Dilia Aguirre RN  Case Management  [facility]  [phone]

## 2023-06-19 NOTE — NURSING
Patient had BMs x 2.  Patient observed holding breath and bearing down.  Requested to removed rectal tube ([employer] System).  Approved by NINFA Menendez M.D.

## 2023-06-19 NOTE — SUBJECTIVE & OBJECTIVE
Interval History:   6/19: notified temp of 97 and . Added cultures, broad spectrum ABX. Will follow up paracentesis results.     Review of Systems   Reason unable to perform ROS: lethargy and confusion.   Objective:     Vital Signs (Most Recent):  Temp: 97.4 °F (36.3 °C) (06/19/23 1536)  Pulse: 104 (06/19/23 1536)  Resp: 18 (06/19/23 1536)  BP: (!) 87/53 (06/19/23 1536)  SpO2: (!) 94 % (06/19/23 1536) Vital Signs (24h Range):  Temp:  [97.4 °F (36.3 °C)-98.6 °F (37 °C)] 97.4 °F (36.3 °C)  Pulse:  [104-127] 104  Resp:  [16-24] 18  SpO2:  [92 %-98 %] 94 %  BP: ()/(51-76) 87/53     Weight: 85.7 kg (188 lb 15 oz)  Body mass index is 27.11 kg/m².    Intake/Output Summary (Last 24 hours) at 6/19/2023 1555  Last data filed at 6/19/2023 1501  Gross per 24 hour   Intake 720 ml   Output 5350 ml   Net -4630 ml         Physical Exam  Constitutional:       General: He is not in acute distress.     Appearance: He is well-developed. He is ill-appearing. He is not diaphoretic.   HENT:      Head: Normocephalic and atraumatic.      Nose: Nose normal.      Mouth/Throat:      Pharynx: No oropharyngeal exudate.   Eyes:      General: No scleral icterus.     Conjunctiva/sclera: Conjunctivae normal.      Pupils: Pupils are equal, round, and reactive to light.   Neck:      Thyroid: No thyromegaly.      Vascular: No JVD.      Trachea: No tracheal deviation.   Cardiovascular:      Rate and Rhythm: Normal rate and regular rhythm.      Heart sounds: Normal heart sounds. No murmur heard.  Pulmonary:      Effort: Pulmonary effort is normal. No respiratory distress.      Breath sounds: Rhonchi present. No wheezing or rales.   Chest:      Chest wall: No tenderness.   Abdominal:      General: Bowel sounds are normal. There is distension (moderate distention with ascites).      Palpations: Abdomen is soft. There is no mass.      Tenderness: There is abdominal tenderness (mild diffuse TTP w/o guarding). There is no guarding or rebound.       Hernia: A hernia (reducilble umbilical hernia) is present.   Musculoskeletal:         General: No tenderness.      Cervical back: Normal range of motion and neck supple.      Right lower leg: Edema present.      Left lower leg: Edema present.   Lymphadenopathy:      Cervical: No cervical adenopathy.   Skin:     General: Skin is warm and dry.      Coloration: Skin is jaundiced.      Findings: No erythema or rash.   Neurological:      Mental Status: He is alert.      Cranial Nerves: No cranial nerve deficit.      Motor: No abnormal muscle tone.      Coordination: Coordination normal.      Deep Tendon Reflexes: Reflexes are normal and symmetric. Reflexes normal.      Comments: Oriented to person only, waxing and waning mental status. Able to follow simple commands and speaks few words. Limited neuro status due to unable to lack of patient cooperation.            Significant Labs: All pertinent labs within the past 24 hours have been reviewed.    Significant Imaging: I have reviewed all pertinent imaging results/findings within the past 24 hours.

## 2023-06-19 NOTE — H&P
Inpatient Radiology Pre-procedure Note    History of Present Illness:  Cornelio Rivers is a 58 y.o. male who presents for ultrasound guided paracentesis.  Admission H&P reviewed.  Past Medical History:   Diagnosis Date    Alcoholic cirrhosis of liver with ascites     Ascites     Coagulopathy     Esophageal varices with bleeding     Hepatic encephalopathy     Mixed hyperlipidemia     Portal hypertension     Thrombocytopenia, unspecified      Past Surgical History:   Procedure Laterality Date    BICEPS TENDON REPAIR Right     femur facture Right     HERNIA REPAIR Bilateral        Review of Systems:   As documented in primary team H&P    Home Meds:   Prior to Admission medications    Medication Sig Start Date End Date Taking? Authorizing Provider   ciprofloxacin HCl (CIPRO) 500 MG tablet Take 500 mg by mouth Daily. 5/4/23  Yes Historical Provider   furosemide (LASIX) 40 MG tablet Take 40 mg by mouth 2 (two) times daily. 5/29/23  Yes Historical Provider   lactulose (CHRONULAC) 20 gram/30 mL Soln 15 ml   Yes Historical Provider   mirtazapine (REMERON) 7.5 MG Tab Take 7.5 mg by mouth every evening. 5/5/23  Yes Historical Provider   nadoloL (CORGARD) 40 MG tablet Take 40 mg by mouth once daily. 4/26/23  Yes Historical Provider   pantoprazole (PROTONIX) 40 MG tablet Take 40 mg by mouth once daily. 5/29/23  Yes Historical Provider   spironolactone (ALDACTONE) 100 MG tablet Take 100 mg by mouth once daily. 5/22/23  Yes Historical Provider   XIFAXAN 550 mg Tab Take 550 mg by mouth 2 (two) times daily. 5/29/23  Yes Historical Provider   meclizine (ANTIVERT) 25 mg tablet Take 25 mg by mouth daily as needed. 4/19/23   Historical Provider     Scheduled Meds:    ciprofloxacin HCl  500 mg Oral Daily    furosemide  40 mg Oral Daily    lactulose  30 g Oral Q6H    midodrine  10 mg Oral TID WM    mupirocin   Nasal BID    pantoprazole  40 mg Oral Daily    rifAXImin  550 mg Oral BID    spironolactone  100 mg Oral Daily    thiamine (VITAMIN  B1) IVPB  500 mg Intravenous TID     Continuous Infusions:   PRN Meds:acetaminophen, albuterol-ipratropium, dextrose 10%, dextrose 10%, dextrose, dextrose, glucagon (human recombinant), melatonin, naloxone, ondansetron, sodium chloride 0.9%  Anticoagulants/Antiplatelets: no anticoagulation    Allergies: Review of patient's allergies indicates:  No Known Allergies  Sedation Hx: have not been any systemic reactions    Vitals:  Temp: 97.7 °F (36.5 °C) (06/19/23 0706)  Pulse: (!) 111 (06/19/23 0902)  Resp: 16 (06/19/23 0902)  BP: 107/72 (06/19/23 0902)  SpO2: 98 % (06/19/23 0902)     Physical Exam:  ASA: 3  Mallampati: n/a    General: no acute distress  Mental Status: alert but not oriented to person, place and time  HEENT: normocephalic, atraumatic  Chest: unlabored breathing  Heart: regular heart rate  Abdomen: distended  Extremity: moves all extremities    Plan: ultrasound guided paracentesis  Sedation Plan: local    RAAD Morataya, FNP  Interventional Radiology  [phone] [facility]

## 2023-06-19 NOTE — PLAN OF CARE
Para completed, pt tolerated well. No apparent distress noted. 4.7 Liters removed from RLQ, mepore applied CDI. Labs collected and sent. No albumin given per protocol.  Pt transferred back to room via transport. Report called to MATI Miranda.

## 2023-06-19 NOTE — SUBJECTIVE & OBJECTIVE
Interval History:   6/18: remains confused    Review of Systems   Reason unable to perform ROS: lethargy and confusion.   Objective:     Vital Signs (Most Recent):  Temp: 97.4 °F (36.3 °C) (06/19/23 1536)  Pulse: 104 (06/19/23 1536)  Resp: 18 (06/19/23 1536)  BP: (!) 87/53 (06/19/23 1536)  SpO2: (!) 94 % (06/19/23 1536) Vital Signs (24h Range):  Temp:  [97.4 °F (36.3 °C)-98.6 °F (37 °C)] 97.4 °F (36.3 °C)  Pulse:  [104-127] 104  Resp:  [16-24] 18  SpO2:  [92 %-98 %] 94 %  BP: ()/(51-76) 87/53     Weight: 85.7 kg (188 lb 15 oz)  Body mass index is 27.11 kg/m².    Intake/Output Summary (Last 24 hours) at 6/19/2023 1610  Last data filed at 6/19/2023 1501  Gross per 24 hour   Intake 720 ml   Output 5350 ml   Net -4630 ml         Physical Exam  Constitutional:       General: He is not in acute distress.     Appearance: He is well-developed. He is ill-appearing. He is not diaphoretic.   HENT:      Head: Normocephalic and atraumatic.      Nose: Nose normal.      Mouth/Throat:      Pharynx: No oropharyngeal exudate.   Eyes:      General: No scleral icterus.     Conjunctiva/sclera: Conjunctivae normal.      Pupils: Pupils are equal, round, and reactive to light.   Neck:      Thyroid: No thyromegaly.      Vascular: No JVD.      Trachea: No tracheal deviation.   Cardiovascular:      Rate and Rhythm: Normal rate and regular rhythm.      Heart sounds: Normal heart sounds. No murmur heard.  Pulmonary:      Effort: Pulmonary effort is normal. No respiratory distress.      Breath sounds: Rhonchi present. No wheezing or rales.   Chest:      Chest wall: No tenderness.   Abdominal:      General: Bowel sounds are normal. There is distension (moderate distention with ascites).      Palpations: Abdomen is soft. There is no mass.      Tenderness: There is abdominal tenderness (mild diffuse TTP w/o guarding). There is no guarding or rebound.      Hernia: A hernia (reducilble umbilical hernia) is present.   Musculoskeletal:          General: No tenderness.      Cervical back: Normal range of motion and neck supple.      Right lower leg: Edema present.      Left lower leg: Edema present.   Lymphadenopathy:      Cervical: No cervical adenopathy.   Skin:     General: Skin is warm and dry.      Coloration: Skin is jaundiced.      Findings: No erythema or rash.   Neurological:      Mental Status: He is alert.      Cranial Nerves: No cranial nerve deficit.      Motor: No abnormal muscle tone.      Coordination: Coordination normal.      Deep Tendon Reflexes: Reflexes are normal and symmetric. Reflexes normal.      Comments: Oriented to person only, waxing and waning mental status. Able to follow simple commands and speaks few words. Limited neuro status due to unable to lack of patient cooperation.            Significant Labs: All pertinent labs within the past 24 hours have been reviewed.    Significant Imaging: I have reviewed all pertinent imaging results/findings within the past 24 hours.

## 2023-06-19 NOTE — NURSING TRANSFER
Nursing Transfer Note      6/19/2023     Reason patient is being transferred: CT Head without contrast    Transfer : CT    Transfer via bed    Transfer with cardiac monitoring, oxygen    Transported by Transport Team    Telemetry: Rhythm ST    Medicines sent: Oxygen 3L nasal cannula    Any special needs or follow-up needed: Patient is a fall risk.

## 2023-06-19 NOTE — NURSING TRANSFER
Nursing Transfer Note      6/19/2023     Reason patient is being transferred: IR for paracentesis    Transfer To: IR     Transfer via stretcher    Transfer with cardiac monitoring, O2    Transported by Catalina Haile    Telemetry: Rhythm NSR    Medicines sent: Oxygen    Any special needs or follow-up needed: None    Spouse at bedside and will accompany Patient to IR for consent.     Called Telemetry to notify Patient is off unit.

## 2023-06-19 NOTE — PT/OT/SLP PROGRESS
Physical Therapy Treatment    Patient Name:  Cornelio Rivers   MRN:  [id number]    Recommendations:     Discharge Recommendations: nursing facility, skilled  Discharge Equipment Recommendations: hospital bed, lift device, wheelchair  Barriers to discharge: Decreased caregiver support Pt requiring increased skilled assistance at current time.     Assessment:     Cornelio Rivers is a 58 y.o. male admitted with a medical diagnosis of Decompensated hepatic cirrhosis.  He presents with the following impairments/functional limitations: weakness, impaired endurance, impaired sensation, impaired self care skills, impaired functional mobility, gait instability, impaired balance, impaired cognition, decreased coordination, decreased upper extremity function, decreased lower extremity function, decreased safety awareness, pain, impaired coordination requiring significant assistance and verbal cues for bed mob, scooting to EOB/HOB, static sitting at the EOB to prevent falls due to weakness, decreased alertness, uncontrollable body tremors.   In light of pt's current functional level and deficits, it is anticipated that pt will need to participate in an intense rehab program consisting of PT and OT in order to achieve full rehab potential to return to previous level of function and roles.  Pt remains motivated to participate in PT session and will cont to benefit from skilled PT intervention..    Rehab Prognosis: Fair; patient would benefit from acute skilled PT services to address these deficits and reach maximum level of function.    Recent Surgery: * No surgery found *      Plan:     During this hospitalization, patient to be seen 3 x/week to address the identified rehab impairments via gait training, therapeutic activities, therapeutic exercises, neuromuscular re-education and progress toward the following goals:    Plan of Care Expires:  07/15/23    Subjective     Chief Complaint: weakness and pain  Pain/Comfort:  Pain Rating 1:  (no  rating provided, however, pt with facial grimacing)  Location - Orientation 1: generalized  Location 1:  (unspecified)  Pain Addressed 1: Reposition, Distraction, Cessation of Activity  Pain Rating Post-Intervention 1:  (no rating provided)      Objective:     3 attempts for tx session: 1.  Pt ERMELINDA away for paracentesis at 9:15 am; 2. Pt awaiting to go for CT at 10:56 am    Communicated with nurse (Ruth) prior to session.  Patient found  HOB at 35* angle  with bowel management system, Condom Catheter, pressure relief boots, telemetry, oxygen (waffle pad) and wife present upon PT entry to room.     General Precautions: Standard, fall  Orthopedic Precautions: N/A  Braces: N/A  Respiratory Status: Nasal cannula, flow 2 L/min     Functional Mobility:  Bed Mobility:     Rolling Left:  total assistance  Rolling Right: total assistance  Scooting: anteriorly to EOB with max A of 1; to HOB via drawsheet dependent of 2  Supine to Sit: max/total A of 1, exiting on the L side, HOB at 30* angle  Sit to Supine: max A of 2 for trunk and LE's, HOB flat  Transfers:     Sit to Stand:  from EOB NP 2* pt with poor trunk control   Balance: static sitting at the EOB initially with total A for trunk and head control then max/mod A of 1 x5 min.  Pt demonstrates R post lean and body tremors      AM-PAC 6 CLICK MOBILITY  Turning over in bed (including adjusting bedclothes, sheets and blankets)?: 1  Sitting down on and standing up from a chair with arms (e.g., wheelchair, bedside commode, etc.): 1  Moving from lying on back to sitting on the side of the bed?: 1  Moving to and from a bed to a chair (including a wheelchair)?: 1  Need to walk in hospital room?: 1  Climbing 3-5 steps with a railing?: 1  Basic Mobility Total Score: 6       Treatment & Education:  Patient provided with daily orientation and goals of this PT session. They were educated to call for assistance and to transfer with hospital staff only.  Also, pt was educated on the  effects of prolonged immobility and the importance of performing OOB activity and exercises to promote healing and reduce recovery time    Patient left HOB elevated with all lines intact, call button in reach, nurse notified, and wife present..    GOALS:   Multidisciplinary Problems       Physical Therapy Goals          Problem: Physical Therapy    Goal Priority Disciplines Outcome Goal Variances Interventions   Physical Therapy Goal     PT, PT/OT Ongoing, Progressing     Description: Goals to be met by: 2023     Patient will increase functional independence with mobility by performin. Supine to sit with moderate assistance  2. Sit to supine with moderate assistance  3. Sit to stand transfer with maximal assistance  4. Bed to chair transfer with maximal assistance using LRAD as needed  5. Gait  x 10 feet with maximal assistance using LRAD as needed  6. Lower extremity exercise program x10 reps per handout, with supervision                        Time Tracking:     PT Received On: 23  PT Start Time: 1431     PT Stop Time: 1454  PT Total Time (min): 23 min     Billable Minutes: Therapeutic Activity 23    Treatment Type: Treatment  PT/PTA: PTA     Number of PTA visits since last PT visit: 2023

## 2023-06-19 NOTE — NURSING
BP 87/53, Map 63, , T 97.4, 94% on 2L nasal cannula.  NINFA Menendez M.D. and [facility] notified via secure chat.  Albumin and Zosyn ordered.

## 2023-06-19 NOTE — PT/OT/SLP PROGRESS
Occupational Therapy      Patient Name:  Cornelio Rivers   MRN:  [id number]    Patient not seen today secondary to off the floor - IR . Will follow-up tomorrow as appropriate.    6/19/2023

## 2023-06-19 NOTE — PROGRESS NOTES
Pharmacokinetic Initial Assessment: IV Vancomycin    Assessment/Plan:    Initiate intravenous vancomycin with loading dose of 2000 mg once followed by a maintenance dose of vancomycin 1250mg IV every 8 hours  Desired empiric serum trough concentration is 15 to 20 mcg/mL  Draw vancomycin trough level 60 min prior to fourth dose on 6/20 at approximately 1600  Pharmacy will continue to follow and monitor vancomycin.      Please contact pharmacy at extension [phone] with any questions regarding this assessment.     Thank you for the consult,   Cat Valerie       Patient brief summary:  Cornelio Rivers is a 58 y.o. male initiated on antimicrobial therapy with IV Vancomycin for treatment of suspected bacteremia    Drug Allergies:   Review of patient's allergies indicates:  No Known Allergies    Actual Body Weight:   85.7 kg    Renal Function:   Estimated Creatinine Clearance: 166.3 mL/min (based on SCr of 0.5 mg/dL).,     Dialysis Method (if applicable):  N/A    CBC (last 72 hours):  Recent Labs   Lab Result Units 06/17/23 0429 06/18/23  0546 06/19/23  1148   WBC K/uL 7.60 8.14 10.16   Hemoglobin g/dL 7.6* 8.2* 8.3*   Hematocrit % 22.2* 25.1* 24.7*   Platelets K/uL 88* 120* 142*   Gran % % 60.5 62.6 70.5   Lymph % % 20.5 19.8 13.1*   Mono % % 10.7 11.3 12.1   Eosinophil % % 6.7 4.9 2.3   Basophil % % 0.9 0.7 0.6   Differential Method  Automated Automated Automated       Metabolic Panel (last 72 hours):  Recent Labs   Lab Result Units 06/17/23  0429 06/18/23  0546 06/19/23  0806   Sodium mmol/L 134* 134* 132*   Potassium mmol/L 3.5 3.5 4.3   Chloride mmol/L 94* 91* 91*   CO2 mmol/L 30* 32* 26   Glucose mg/dL 81 130* 131*   BUN mg/dL 10 8 8   Creatinine mg/dL 0.5 0.6 0.5   Albumin g/dL 2.2* 2.3* 2.5*   Total Bilirubin mg/dL 11.7* 13.6* 14.3*   Alkaline Phosphatase U/L 184* 186* 185*   AST U/L 81* 86* 120*   ALT U/L 57* 59* 69*   Magnesium mg/dL 1.4* 1.4* 1.6   Phosphorus mg/dL 2.5* 2.8  --        Drug levels (last 3  results):  No results for input(s): VANCOMYCINRA, VANCORANDOM, VANCOMYCINPE, VANCOPEAK, VANCOMYCINTR, VANCOTROUGH in the last 72 hours.    Microbiologic Results:  Microbiology Results (last 7 days)       Procedure Component Value Units Date/Time    Blood culture [[id number]]     Order Status: Sent Specimen: Blood     Blood culture [[id number]]     Order Status: Sent Specimen: Blood     (rule out SBP) Culture, Anaerobic [[id number]] Collected: 06/15/23 1213    Order Status: Completed Specimen: Ascites Updated: 06/19/23 1538     Anaerobic Culture No anaerobes isolated    Narrative:      To rule out SBP order labs: Aerobic culture [[id number]],  Culture, Anaerobic [PVO132], Gram stain [AUJ320], Albumin  [OJF619], Protein [HBO945], LDH [WME488], WBC \T\ Dff  [OCW2431].    Blood culture [[id number]] Collected: 06/19/23 0806    Order Status: Sent Specimen: Blood Updated: 06/19/23 1017    Narrative:      Rt forearm    (rule out SBP) Aerobic culture [[id number]] Collected: 06/15/23 1213    Order Status: Completed Specimen: Ascites Updated: 06/19/23 1008     Aerobic Bacterial Culture No growth    Narrative:      To rule out SBP order labs: Aerobic culture [HGU880],  Culture, Anaerobic [HCS166], Gram stain [ABT589], Albumin  [PAL643], Protein [LHX701], LDH [HTF489], WBC \T\ Dff  [JZR7739].    (rule out SBP) Culture, Anaerobic [[id number]] Collected: 06/19/23 0920    Order Status: Sent Specimen: Ascites Updated: 06/19/23 0930    (rule out SBP) Aerobic culture [[id number]] Collected: 06/19/23 0920    Order Status: Sent Specimen: Ascites Updated: 06/19/23 0930    (rule out SBP) Gram stain [[id number]] Collected: 06/19/23 0920    Order Status: Sent Specimen: Ascites Updated: 06/19/23 0930    (rule out SBP) Gram stain [[id number]] Collected: 06/15/23 1213    Order Status: Completed Specimen: Ascites Updated: 06/15/23 1802     Gram Stain Result No WBC's      No organisms seen    Narrative:      To rule out SBP order labs: Aerobic culture  [TLO317],  Culture, Anaerobic [JDT231], Gram stain [WKT838], Albumin  [YRW872], Protein [HYP763], LDH [LMY845], WBC \T\ Dff  [[id number]].

## 2023-06-20 PROBLEM — Z51.5 PALLIATIVE CARE ENCOUNTER: Status: ACTIVE | Noted: 2023-06-20

## 2023-06-20 PROBLEM — G93.40 ENCEPHALOPATHY: Status: ACTIVE | Noted: 2023-06-20

## 2023-06-20 LAB
ACANTHOCYTES BLD QL SMEAR: PRESENT
ALBUMIN SERPL BCP-MCNC: 2.6 G/DL (ref 3.5–5.2)
ALP SERPL-CCNC: 150 U/L (ref 55–135)
ALT SERPL W/O P-5'-P-CCNC: 63 U/L (ref 10–44)
ANION GAP SERPL CALC-SCNC: 10 MMOL/L (ref 8–16)
ANISOCYTOSIS BLD QL SMEAR: ABNORMAL
AST SERPL-CCNC: 88 U/L (ref 10–40)
BASO STIPL BLD QL SMEAR: ABNORMAL
BASOPHILS # BLD AUTO: 0.07 K/UL (ref 0–0.2)
BASOPHILS NFR BLD: 0.8 % (ref 0–1.9)
BILIRUB SERPL-MCNC: 13.3 MG/DL (ref 0.1–1)
BUN SERPL-MCNC: 8 MG/DL (ref 6–20)
BURR CELLS BLD QL SMEAR: ABNORMAL
CALCIUM SERPL-MCNC: 8.7 MG/DL (ref 8.7–10.5)
CERULOPLASMIN SERPL-MCNC: 15 MG/DL (ref 15–45)
CHLORIDE SERPL-SCNC: 87 MMOL/L (ref 95–110)
CO2 SERPL-SCNC: 30 MMOL/L (ref 23–29)
CREAT SERPL-MCNC: 0.7 MG/DL (ref 0.5–1.4)
DIFFERENTIAL METHOD: ABNORMAL
EOSINOPHIL # BLD AUTO: 0.6 K/UL (ref 0–0.5)
EOSINOPHIL NFR BLD: 6.8 % (ref 0–8)
ERYTHROCYTE [DISTWIDTH] IN BLOOD BY AUTOMATED COUNT: ABNORMAL % (ref 11.5–14.5)
EST. GFR  (NO RACE VARIABLE): >60 ML/MIN/1.73 M^2
GLUCOSE SERPL-MCNC: 112 MG/DL (ref 70–110)
HCT VFR BLD AUTO: 22.1 % (ref 40–54)
HGB BLD-MCNC: 7.4 G/DL (ref 14–18)
HYPOCHROMIA BLD QL SMEAR: ABNORMAL
IMM GRANULOCYTES # BLD AUTO: 0.05 K/UL (ref 0–0.04)
IMM GRANULOCYTES NFR BLD AUTO: 0.5 % (ref 0–0.5)
INR PPP: 2.2 (ref 0.8–1.2)
INR PPP: 2.2 (ref 0.8–1.2)
IRON SERPL-MCNC: 115 UG/DL (ref 45–160)
LYMPHOCYTES # BLD AUTO: 1.2 K/UL (ref 1–4.8)
LYMPHOCYTES NFR BLD: 13.6 % (ref 18–48)
MCH RBC QN AUTO: 36.6 PG (ref 27–31)
MCHC RBC AUTO-ENTMCNC: 33.5 G/DL (ref 32–36)
MCV RBC AUTO: 109 FL (ref 82–98)
MITOCHONDRIA AB TITR SER IF: NORMAL {TITER}
MONOCYTES # BLD AUTO: 1.1 K/UL (ref 0.3–1)
MONOCYTES NFR BLD: 12.5 % (ref 4–15)
NEUTROPHILS # BLD AUTO: 6 K/UL (ref 1.8–7.7)
NEUTROPHILS NFR BLD: 65.8 % (ref 38–73)
NRBC BLD-RTO: 0 /100 WBC
OVALOCYTES BLD QL SMEAR: ABNORMAL
PLATELET # BLD AUTO: 107 K/UL (ref 150–450)
PLATELET BLD QL SMEAR: ABNORMAL
PMV BLD AUTO: 10.9 FL (ref 9.2–12.9)
POIKILOCYTOSIS BLD QL SMEAR: SLIGHT
POTASSIUM SERPL-SCNC: 3.6 MMOL/L (ref 3.5–5.1)
PROT SERPL-MCNC: 5.6 G/DL (ref 6–8.4)
PROTHROMBIN TIME: 22.7 SEC (ref 9–12.5)
PROTHROMBIN TIME: 22.7 SEC (ref 9–12.5)
RBC # BLD AUTO: 2.02 M/UL (ref 4.6–6.2)
SATURATED IRON: 91 % (ref 20–50)
SCHISTOCYTES BLD QL SMEAR: ABNORMAL
SCHISTOCYTES BLD QL SMEAR: PRESENT
SODIUM SERPL-SCNC: 127 MMOL/L (ref 136–145)
TOTAL IRON BINDING CAPACITY: 126 UG/DL (ref 250–450)
TRANSFERRIN SERPL-MCNC: 85 MG/DL (ref 200–375)
VIT B12 SERPL-MCNC: >2000 PG/ML (ref 210–950)
WBC # BLD AUTO: 9.14 K/UL (ref 3.9–12.7)

## 2023-06-20 PROCEDURE — 99223 PR INITIAL HOSPITAL CARE,LEVL III: ICD-10-PCS | Mod: 25,NTX,, | Performed by: INTERNAL MEDICINE

## 2023-06-20 PROCEDURE — 84466 ASSAY OF TRANSFERRIN: CPT | Mod: NTX | Performed by: HOSPITALIST

## 2023-06-20 PROCEDURE — 95700 EEG CONT REC W/VID EEG TECH: CPT | Mod: NTX

## 2023-06-20 PROCEDURE — 92526 ORAL FUNCTION THERAPY: CPT | Mod: NTX

## 2023-06-20 PROCEDURE — 99233 PR SUBSEQUENT HOSPITAL CARE,LEVL III: ICD-10-PCS | Mod: NTX,,, | Performed by: HOSPITALIST

## 2023-06-20 PROCEDURE — 86381 MITOCHONDRIAL ANTIBODY EACH: CPT | Mod: NTX | Performed by: HOSPITALIST

## 2023-06-20 PROCEDURE — 36415 COLL VENOUS BLD VENIPUNCTURE: CPT | Mod: NTX | Performed by: HOSPITALIST

## 2023-06-20 PROCEDURE — 84425 ASSAY OF VITAMIN B-1: CPT | Mod: NTX | Performed by: HOSPITALIST

## 2023-06-20 PROCEDURE — 86038 ANTINUCLEAR ANTIBODIES: CPT | Mod: NTX | Performed by: HOSPITALIST

## 2023-06-20 PROCEDURE — 99222 PR INITIAL HOSPITAL CARE,LEVL II: ICD-10-PCS | Mod: NTX,,, | Performed by: PSYCHIATRY & NEUROLOGY

## 2023-06-20 PROCEDURE — 99222 1ST HOSP IP/OBS MODERATE 55: CPT | Mod: NTX,,, | Performed by: PSYCHIATRY & NEUROLOGY

## 2023-06-20 PROCEDURE — 82103 ALPHA-1-ANTITRYPSIN TOTAL: CPT | Mod: NTX | Performed by: HOSPITALIST

## 2023-06-20 PROCEDURE — 82390 ASSAY OF CERULOPLASMIN: CPT | Mod: NTX | Performed by: HOSPITALIST

## 2023-06-20 PROCEDURE — 99497 ADVNCD CARE PLAN 30 MIN: CPT | Mod: 25,NTX,, | Performed by: INTERNAL MEDICINE

## 2023-06-20 PROCEDURE — 87522 HEPATITIS C REVRS TRNSCRPJ: CPT | Mod: NTX | Performed by: HOSPITALIST

## 2023-06-20 PROCEDURE — 99223 1ST HOSP IP/OBS HIGH 75: CPT | Mod: 25,NTX,, | Performed by: INTERNAL MEDICINE

## 2023-06-20 PROCEDURE — 95720 EEG PHY/QHP EA INCR W/VEEG: CPT | Mod: NTX,,, | Performed by: PSYCHIATRY & NEUROLOGY

## 2023-06-20 PROCEDURE — 85610 PROTHROMBIN TIME: CPT | Mod: NTX | Performed by: HOSPITALIST

## 2023-06-20 PROCEDURE — 95720 PR EEG, W/VIDEO, CONT RECORD, I&R, >12<26 HRS: ICD-10-PCS | Mod: NTX,,, | Performed by: PSYCHIATRY & NEUROLOGY

## 2023-06-20 PROCEDURE — 63600175 PHARM REV CODE 636 W HCPCS: Mod: NTX | Performed by: HOSPITALIST

## 2023-06-20 PROCEDURE — 97112 NEUROMUSCULAR REEDUCATION: CPT | Mod: NTX

## 2023-06-20 PROCEDURE — 82104 ALPHA-1-ANTITRYPSIN PHENO: CPT | Mod: NTX | Performed by: HOSPITALIST

## 2023-06-20 PROCEDURE — 99497 PR ADVNCD CARE PLAN 30 MIN: ICD-10-PCS | Mod: 25,NTX,, | Performed by: INTERNAL MEDICINE

## 2023-06-20 PROCEDURE — 86015 ACTIN ANTIBODY EACH: CPT | Mod: NTX | Performed by: HOSPITALIST

## 2023-06-20 PROCEDURE — 97535 SELF CARE MNGMENT TRAINING: CPT | Mod: NTX

## 2023-06-20 PROCEDURE — 95714 VEEG EA 12-26 HR UNMNTR: CPT | Mod: NTX

## 2023-06-20 PROCEDURE — 99233 SBSQ HOSP IP/OBS HIGH 50: CPT | Mod: NTX,,, | Performed by: HOSPITALIST

## 2023-06-20 PROCEDURE — 25000003 PHARM REV CODE 250: Mod: NTX | Performed by: HOSPITALIST

## 2023-06-20 PROCEDURE — 20600001 HC STEP DOWN PRIVATE ROOM: Mod: NTX

## 2023-06-20 PROCEDURE — 82607 VITAMIN B-12: CPT | Mod: NTX | Performed by: HOSPITALIST

## 2023-06-20 PROCEDURE — 85025 COMPLETE CBC W/AUTO DIFF WBC: CPT | Mod: NTX | Performed by: HOSPITALIST

## 2023-06-20 PROCEDURE — 80053 COMPREHEN METABOLIC PANEL: CPT | Mod: NTX | Performed by: HOSPITALIST

## 2023-06-20 RX ADMIN — RIFAXIMIN 550 MG: 550 TABLET ORAL at 09:06

## 2023-06-20 RX ADMIN — VANCOMYCIN HYDROCHLORIDE 1250 MG: 1.25 INJECTION, POWDER, LYOPHILIZED, FOR SOLUTION INTRAVENOUS at 01:06

## 2023-06-20 RX ADMIN — LACTULOSE 30 G: 10 SOLUTION ORAL at 06:06

## 2023-06-20 RX ADMIN — PIPERACILLIN SODIUM AND TAZOBACTAM SODIUM 4.5 G: 4; .5 INJECTION, POWDER, LYOPHILIZED, FOR SOLUTION INTRAVENOUS at 10:06

## 2023-06-20 RX ADMIN — MUPIROCIN: 20 OINTMENT TOPICAL at 09:06

## 2023-06-20 RX ADMIN — PIPERACILLIN SODIUM AND TAZOBACTAM SODIUM 4.5 G: 4; .5 INJECTION, POWDER, LYOPHILIZED, FOR SOLUTION INTRAVENOUS at 06:06

## 2023-06-20 RX ADMIN — THIAMINE HYDROCHLORIDE 500 MG: 100 INJECTION, SOLUTION INTRAMUSCULAR; INTRAVENOUS at 09:06

## 2023-06-20 RX ADMIN — THIAMINE HYDROCHLORIDE 500 MG: 100 INJECTION, SOLUTION INTRAMUSCULAR; INTRAVENOUS at 03:06

## 2023-06-20 RX ADMIN — SPIRONOLACTONE 100 MG: 50 TABLET ORAL at 08:06

## 2023-06-20 RX ADMIN — PANTOPRAZOLE SODIUM 40 MG: 40 TABLET, DELAYED RELEASE ORAL at 08:06

## 2023-06-20 RX ADMIN — THIAMINE HYDROCHLORIDE 500 MG: 100 INJECTION, SOLUTION INTRAMUSCULAR; INTRAVENOUS at 10:06

## 2023-06-20 RX ADMIN — MIDODRINE HYDROCHLORIDE 15 MG: 5 TABLET ORAL at 08:06

## 2023-06-20 RX ADMIN — RIFAXIMIN 550 MG: 550 TABLET ORAL at 08:06

## 2023-06-20 RX ADMIN — LACTULOSE 30 G: 10 SOLUTION ORAL at 12:06

## 2023-06-20 RX ADMIN — MIDODRINE HYDROCHLORIDE 15 MG: 5 TABLET ORAL at 12:06

## 2023-06-20 RX ADMIN — VANCOMYCIN HYDROCHLORIDE 1250 MG: 1.25 INJECTION, POWDER, LYOPHILIZED, FOR SOLUTION INTRAVENOUS at 12:06

## 2023-06-20 RX ADMIN — PIPERACILLIN SODIUM AND TAZOBACTAM SODIUM 4.5 G: 4; .5 INJECTION, POWDER, LYOPHILIZED, FOR SOLUTION INTRAVENOUS at 03:06

## 2023-06-20 RX ADMIN — FUROSEMIDE 40 MG: 40 TABLET ORAL at 08:06

## 2023-06-20 RX ADMIN — MIDODRINE HYDROCHLORIDE 15 MG: 5 TABLET ORAL at 05:06

## 2023-06-20 RX ADMIN — MUPIROCIN: 20 OINTMENT TOPICAL at 08:06

## 2023-06-20 NOTE — HPI
Cornelio Rivers is a unfortunate 57 yo gentleman with a history of alcoholic cirrhosis of the liver who had a significant episode with GI/variceal bleed, SBP and HE in about 2020. He has been astinent for some years but had a recent relapse. He has had a slow decline in functional capacity and health overall over the past 2-3 months per wife. He was admitted on 6/3 with acute decline in mental status, coagulopathy and LETICIA (MELD 32). He was admitted to [facility] in [place], treated in the ICU for SBP with shock, LETICIA and encephalopathy.   He is being transferred for evaluation by hepatology and consideration of liver transplant.   Since transfer, the pt. Has had persistent severe encephalopathy. His MELD has improved to ~25 now and paracentesis does not suggest ongoing SBP. Workup for his MS changes have been without clear culprit thus far. EEG is being performed and Neurology is consulted, however, concerns persist that his AMS is result of ESLD. He is not felt a candidate for LTX.     In this setting I am asked to assist with goals of care and ACP discussions.     Discussed with Dr. Menendez at length.

## 2023-06-20 NOTE — SUBJECTIVE & OBJECTIVE
Interval History:   6/20: no improvement in mental status    Review of Systems   Reason unable to perform ROS: lethargy and confusion.   Objective:     Vital Signs (Most Recent):  Temp: 97.6 °F (36.4 °C) (06/20/23 1535)  Pulse: 99 (06/20/23 1535)  Resp: 20 (06/20/23 1535)  BP: (!) 93/57 (06/20/23 1535)  SpO2: 100 % (06/20/23 1535) Vital Signs (24h Range):  Temp:  [97.6 °F (36.4 °C)-98.5 °F (36.9 °C)] 97.6 °F (36.4 °C)  Pulse:  [] 99  Resp:  [17-20] 20  SpO2:  [94 %-100 %] 100 %  BP: (85-96)/(52-59) 93/57     Weight: 85.7 kg (188 lb 15 oz)  Body mass index is 27.11 kg/m².    Intake/Output Summary (Last 24 hours) at 6/20/2023 1538  Last data filed at 6/20/2023 1331  Gross per 24 hour   Intake 0 ml   Output 725 ml   Net -725 ml         Physical Exam  Constitutional:       General: He is not in acute distress.     Appearance: He is well-developed. He is ill-appearing. He is not diaphoretic.   HENT:      Head: Normocephalic and atraumatic.      Nose: Nose normal.      Mouth/Throat:      Pharynx: No oropharyngeal exudate.   Eyes:      General: No scleral icterus.     Conjunctiva/sclera: Conjunctivae normal.      Pupils: Pupils are equal, round, and reactive to light.   Neck:      Thyroid: No thyromegaly.      Vascular: No JVD.      Trachea: No tracheal deviation.   Cardiovascular:      Rate and Rhythm: Normal rate and regular rhythm.      Heart sounds: Normal heart sounds. No murmur heard.  Pulmonary:      Effort: Pulmonary effort is normal. No respiratory distress.      Breath sounds: Rhonchi present. No wheezing or rales.   Chest:      Chest wall: No tenderness.   Abdominal:      General: Bowel sounds are normal. There is distension (moderate distention with ascites).      Palpations: Abdomen is soft. There is no mass.      Tenderness: There is abdominal tenderness (mild diffuse TTP w/o guarding). There is no guarding or rebound.      Hernia: A hernia (reducilble umbilical hernia) is present.   Musculoskeletal:          General: No tenderness.      Cervical back: Normal range of motion and neck supple.      Right lower leg: Edema present.      Left lower leg: Edema present.   Lymphadenopathy:      Cervical: No cervical adenopathy.   Skin:     General: Skin is warm and dry.      Coloration: Skin is jaundiced.      Findings: No erythema or rash.   Neurological:      Mental Status: He is alert.      Cranial Nerves: No cranial nerve deficit.      Motor: No abnormal muscle tone.      Coordination: Coordination normal.      Deep Tendon Reflexes: Reflexes are normal and symmetric. Reflexes normal.      Comments: Oriented to person only, waxing and waning mental status. Able to follow simple commands and speaks few words. Limited neuro status due to unable to lack of patient cooperation.            Significant Labs: All pertinent labs within the past 24 hours have been reviewed.    Significant Imaging: I have reviewed all pertinent imaging results/findings within the past 24 hours.

## 2023-06-20 NOTE — SUBJECTIVE & OBJECTIVE
Past Medical History:   Diagnosis Date    Alcoholic cirrhosis of liver with ascites     Ascites     Coagulopathy     Esophageal varices with bleeding     Hepatic encephalopathy     Mixed hyperlipidemia     Portal hypertension     Thrombocytopenia, unspecified        Past Surgical History:   Procedure Laterality Date    BICEPS TENDON REPAIR Right     femur facture Right     HERNIA REPAIR Bilateral        Review of patient's allergies indicates:  No Known Allergies      No current facility-administered medications on file prior to encounter.     Current Outpatient Medications on File Prior to Encounter   Medication Sig    ciprofloxacin HCl (CIPRO) 500 MG tablet Take 500 mg by mouth Daily.    furosemide (LASIX) 40 MG tablet Take 40 mg by mouth 2 (two) times daily.    lactulose (CHRONULAC) 20 gram/30 mL Soln 15 ml    mirtazapine (REMERON) 7.5 MG Tab Take 7.5 mg by mouth every evening.    nadoloL (CORGARD) 40 MG tablet Take 40 mg by mouth once daily.    pantoprazole (PROTONIX) 40 MG tablet Take 40 mg by mouth once daily.    spironolactone (ALDACTONE) 100 MG tablet Take 100 mg by mouth once daily.    XIFAXAN 550 mg Tab Take 550 mg by mouth 2 (two) times daily.    meclizine (ANTIVERT) 25 mg tablet Take 25 mg by mouth daily as needed.     Family History       Problem Relation (Age of Onset)    Heart disease Father    Hypertension Mother          Tobacco Use    Smoking status: Former     Types: Cigarettes    Smokeless tobacco: Not on file   Substance and Sexual Activity    Alcohol use: Not Currently    Drug use: Never    Sexual activity: Yes     Review of Systems   Unable to perform ROS: Mental status change   Objective:     Vital Signs (Most Recent):  Temp: 97.9 °F (36.6 °C) (06/20/23 1137)  Pulse: 95 (06/20/23 1137)  Resp: 19 (06/20/23 1137)  BP: (!) 86/59 (06/20/23 1137)  SpO2: 100 % (06/20/23 1137) Vital Signs (24h Range):  Temp:  [97.4 °F (36.3 °C)-98.5 °F (36.9 °C)] 97.9 °F (36.6 °C)  Pulse:  [] 95  Resp:   "[17-20] 19  SpO2:  [94 %-100 %] 100 %  BP: ()/(51-59) 86/59     Weight: 85.7 kg (188 lb 15 oz)  Body mass index is 27.11 kg/m².     Physical Exam   Neurological Exam:  MENTAL STATUS  Level of consciousness: drowsy, opens eyes spontaneously but not to command; does not follow commands  Orientation: disoriented to person, place, time, situation  Withdraws to pain in all four extremities, verbalizes "no, stop" to painful stimuli   No gaze deviation  No posturing observed    CRANIAL NERVES  CN III: PERRL  ->blinks to threat  ->unable to assess EOM due to inability to follow commands, but no gaze deviation noted  ->no nystagmus noted  CN VII: facial expression symmetric and full  CN IX, X: symmetric palate elevation; phonation normal  CN XI: shoulder shrug and head turn intact bilaterally  CN XII: tongue midline, no deviation upon protrusion, no atrophy    MOTOR EXAM  Muscle bulk: normal  Muscle tone: normal  Intermittently moves all four extremities  No rigidity on passive movement, though patient will intermittently oppose passive manipulation  Asterixis present    Biceps, patellar, and achilles reflexes 2+ bilaterally, though delayed  Planter reflex: down-going bilaterally.  Lopez's sign negative bilaterally.    SENSORY EXAM  Withdraws to pain in all four extremities.    COORDINATION  Unable to assess due to acuity of condition.           Significant Labs: All pertinent lab results from the past 24 hours have been reviewed.    Significant Imaging: I have reviewed all pertinent imaging results/findings within the past 24 hours.  "

## 2023-06-20 NOTE — SUBJECTIVE & OBJECTIVE
Interval History:     Past Medical History:   Diagnosis Date    Alcoholic cirrhosis of liver with ascites     Ascites     Coagulopathy     Esophageal varices with bleeding     Hepatic encephalopathy     Mixed hyperlipidemia     Portal hypertension     Thrombocytopenia, unspecified        Past Surgical History:   Procedure Laterality Date    BICEPS TENDON REPAIR Right     femur facture Right     HERNIA REPAIR Bilateral        Review of patient's allergies indicates:  No Known Allergies    Medications:  Continuous Infusions:  Scheduled Meds:   furosemide  40 mg Oral Daily    lactulose  30 g Oral Q6H    midodrine  15 mg Oral TID WM    mupirocin   Nasal BID    pantoprazole  40 mg Oral Daily    piperacillin-tazobactam (Zosyn) IV (PEDS and ADULTS) (extended infusion is not appropriate)  4.5 g Intravenous Q8H    rifAXImin  550 mg Oral BID    spironolactone  100 mg Oral Daily    thiamine (VITAMIN B1) IVPB  500 mg Intravenous TID    vancomycin (VANCOCIN) IVPB  1,250 mg Intravenous Q12H     PRN Meds:acetaminophen, albuterol-ipratropium, dextrose 10%, dextrose 10%, dextrose, dextrose, glucagon (human recombinant), melatonin, naloxone, ondansetron, sodium chloride 0.9%, Pharmacy to dose Vancomycin consult **AND** vancomycin - pharmacy to dose    Family History       Problem Relation (Age of Onset)    Heart disease Father    Hypertension Mother          Tobacco Use    Smoking status: Former     Types: Cigarettes    Smokeless tobacco: Not on file   Substance and Sexual Activity    Alcohol use: Not Currently    Drug use: Never    Sexual activity: Yes       Review of Systems   Unable to perform ROS: Mental status change   Objective:     Vital Signs (Most Recent):  Temp: 97.9 °F (36.6 °C) (06/20/23 1137)  Pulse: 95 (06/20/23 1137)  Resp: 19 (06/20/23 1137)  BP: (!) 86/59 (06/20/23 1137)  SpO2: 100 % (06/20/23 1137) Vital Signs (24h Range):  Temp:  [97.4 °F (36.3 °C)-98.5 °F (36.9 °C)] 97.9 °F (36.6 °C)  Pulse:  [] 95  Resp:   [17-20] 19  SpO2:  [94 %-100 %] 100 %  BP: (85-96)/(52-59) 86/59     Weight: 85.7 kg (188 lb 15 oz)  Body mass index is 27.11 kg/m².       Physical Exam  Constitutional:       Comments: Somnlent, briefly arrousable. Does not track. Chronically ill appearing.    HENT:      Mouth/Throat:      Mouth: Mucous membranes are moist.      Pharynx: No oropharyngeal exudate.   Eyes:      General: Scleral icterus present.   Cardiovascular:      Rate and Rhythm: Normal rate.      Heart sounds: No murmur heard.  Pulmonary:      Effort: No respiratory distress.      Breath sounds: No wheezing.   Abdominal:      General: There is distension.      Tenderness: There is no abdominal tenderness. There is no guarding.   Musculoskeletal:         General: No swelling or deformity.      Cervical back: No rigidity or tenderness.   Skin:     General: Skin is warm.      Coloration: Skin is jaundiced.   Neurological:      General: No focal deficit present.      Motor: Weakness present.      Comments: Asterixis present. Can not hold head up, opens eyes briefly but drifts to sleep. Can not verbalize.           Review of Symptoms      Symptom Assessment (ESAS 0-10 Scale)  Pain:  0  Dyspnea:  0  Anxiety:  0  Nausea:  0  Depression:  0  Anorexia:  0  Fatigue:  0  Insomnia:  0  Restlessness:  0  Agitation:  0  Unable to complete assessment due to Mental status change         Pain Assessment in Advanced Demential Scale (PAINAD)   Breathing - Independent of vocalization:  0  Negative vocalization:  0  Facial expression:  0  Body language:  0  Consolability:  0  Total:  0    Living Arrangements:  Lives with spouse    Psychosocial/Cultural:   See Palliative Psychosocial Note: No  Social Issues Identified: Coping deficit pt/family  Bereavement Risk: No  Caregiver Needs Discussed. Caregiver Distress: Yes: Intensity of family caregiving  Cultural: none  **Primary  to Follow**  Palliative Care  Consult: No    Spiritual:  F - Ramya  and Belief:  Yes  I - Importance:  Yes  C - Community:  Yes  A - Address in Care:  Yes     Time-Based Charting:  No      Advance Care Planning   Advance Directives:   Living Will: No    LaPOST: No    Do Not Resuscitate Status: No    Medical Power of : No      Decision Making:  Family answered questions  Goals of Care: The family and wife endorses that what is most important right now is to focus on comfort and QOL  and avoiding suffering. Hopes to improve condition but worry that it will not get better    Accordingly, we have decided that the best plan to meet the patient's goals includes continuing with treatment and pivot to comfort-focused care       Significant Labs: CBC:   Recent Labs   Lab 06/19/23  1148 06/20/23  0521   WBC 10.16 9.14   HGB 8.3* 7.4*   HCT 24.7* 22.1*   * 107*     CMP:   Recent Labs   Lab 06/19/23  0806 06/20/23 0521   * 127*   K 4.3 3.6   CL 91* 87*   CO2 26 30*   * 112*   BUN 8 8   CREATININE 0.5 0.7   CALCIUM 9.4 8.7   PROT 6.5 5.6*   ALBUMIN 2.5* 2.6*   BILITOT 14.3* 13.3*   ALKPHOS 185* 150*   * 88*   ALT 69* 63*   ANIONGAP 15 10     Coagulation:   Recent Labs   Lab 06/20/23 0521   INR 2.2*  2.2*     CBC:   Recent Labs   Lab 06/20/23 0521   WBC 9.14   HGB 7.4*   HCT 22.1*   *   *     BMP:  Recent Labs   Lab 06/20/23 0521   *   *   K 3.6   CL 87*   CO2 30*   BUN 8   CREATININE 0.7   CALCIUM 8.7     LFT:  Lab Results   Component Value Date    AST 88 (H) 06/20/2023    ALKPHOS 150 (H) 06/20/2023    BILITOT 13.3 (H) 06/20/2023     Albumin:   Albumin   Date Value Ref Range Status   06/20/2023 2.6 (L) 3.5 - 5.2 g/dL Final     Protein:   Total Protein   Date Value Ref Range Status   06/20/2023 5.6 (L) 6.0 - 8.4 g/dL Final     Lactic acid:   No results found for: LACTATE    Significant Imaging: CXR: I have reviewed all pertinent results/findings within the past 24 hours:

## 2023-06-20 NOTE — CONSULTS
[facility] ([place] [id number])  Palliative Medicine  Consult Note    Patient Name: Cornelio Rivers  MRN: [id number]  Admission Date: 6/15/2023  Hospital Length of Stay: 5 days  Code Status: Full Code   Attending Provider: Alexsandra Menendez MD  Consulting Provider: Matthew Castañeda MD  Primary Care Physician: Primary Doctor No  Principal Problem:Decompensated hepatic cirrhosis    Patient information was obtained from spouse/SO and primary team.      Inpatient consult to Palliative Care  Consult performed by: Matthew Castañeda MD  Consult ordered by: Alexsandra Menendez MD      Assessment/Plan:     Palliative Care  Palliative care encounter  Palliative Care Encounter / Goals of care discussion:     Narrative:   Cornelio Rivers is a 58 y.o. male patient with ESLD, MELD 35 >> 25, admitted with acute mental status changes in the setting of presumed SBP, LETICIA. Has persistent MS changes despite aggressive management of SBP and HE. Workup for alternative reasons for AMS underway, EEG pending and Neuro consult requested.   Not felt a LTX candidate due to poor social support, ongoing ETOH and severe MS changes.        1: Psychosocial :     - Marital status: , met with wife Michelle [phone]   - Children: 4 grown children live close to the family home in [place]  - Profession: HVAC tech    2: Medicolegal / Advance Care Planning     - Decision making Capacity: does not have capacity   - Advance directive: not on file - conversations between the couple have been had in the past   - Surrogate Decision Maker: wife Michelle    3: Support System:    - Spiritual: yes  - Family: limited but supportive.      4: Prognostication: felt to have poor prognosis with very limited life expectancy    5: Prior Goals of Care discussions: Dr. Menendez had discussed grim prognosis earlier today.     6: Goals of care Decisions / Symptom Management / Recommendations / Plan:     1: Encounter for Palliative Care    - Code Status: Full Code  "(wife expressed wishes to not undergo life support or resuscitation - DNR decided today)    - Present for discussion: Wife is at the bedside    - Experience with critical illness: limited, but wife has been through a lot with pt. Past ESLD exacerbations.     - Insight in Disease and Illness trajectory: Ms. Rivers is aware that the pt. Has end stage liver disease and tells me that he can not get a transplant in his condition. She tells me about her conversation with Dr. Menendez earlier today and that she was made aware of a severely limited life expectancy. It came as a shock but not a surprise as she felt he was getting sicker.   She feels overwhelmed and distraught and has not been able to think past this hospitalization.      - Goals of care discussion:    - reviewed findings and expressed concerns that we may not be able to reverse the pt. Persistent and severe AMS.    - outlined that we hope that EEG and neuro eval will give additional treatment options, but also worry that his severe MS impairment may persist due to end stage liver disease - she agrees   - Wife tells me that she knew he was getting sicker over the past months and that her  also told her that he knew something was wrong...     - We discussed goals and limitations of care. Michelle tells me that they spoke about "what if" in the past and that her  never wanted to live like a vegetable. He did not want to go on machines.    - We agreed that in this setting DNR would be most appropriate and reflect the pt. Wishes and also prevent potential suffering without benefit.      - We also agreed to await formal evaluation by neurology to then decide the next steps in care. Wife is aware about hospice services and is open to hearing more once all the evaluations are available.     - Goals of Care: await Neurology evaluation.    No machines or CPR - DNR    - Approach to treatment:     - DNR entered in the chart and d/w Dr. Menendez.     2: " Symptom Management:     - Pain: does not appear to be in pain  - Dyspnea: no breathless  - Anxiety: no anxiety    3: ESLD     - ETOH related, ongoing abuse  - MELD 35>> 25 driven by bilirubin  - not felt a transplant candidate  - history of variceal bleed and portal HTN (banding in the past_  - ascites s/p paracentesis and empiric therapy for SBP    4: AMS:  - evaluation underway - EEG and Neuro Consult to rule out seizures  - concerns that this is persistent severe HE vs. Other encephalopathy      6: Summary and Recommendation:     - DNR   - ongoing conversations about disposition once specialists eval complete.      - Recommendations were discussed with Dr. estrada    7: Follow up plans:    Will follow    Thank you for your consult. Please call [phone] with questions.                     Thank you for your consult. I will follow-up with patient. Please contact us if you have any additional questions.    Subjective:     HPI:   Cornelio Rivers is a unfortunate 57 yo gentleman with a history of alcoholic cirrhosis of the liver who had a significant episode with GI/variceal bleed, SBP and HE in about 2020. He has been astinent for some years but had a recent relapse. He has had a slow decline in functional capacity and health overall over the past 2-3 months per wife. He was admitted on 6/3 with acute decline in mental status, coagulopathy and LETICIA (MELD 32). He was admitted to [facility] in [place], treated in the ICU for SBP with shock, LETICIA and encephalopathy.   He is being transferred for evaluation by hepatology and consideration of liver transplant.   Since transfer, the pt. Has had persistent severe encephalopathy. His MELD has improved to ~25 now and paracentesis does not suggest ongoing SBP. Workup for his MS changes have been without clear culprit thus far. EEG is being performed and Neurology is consulted, however, concerns persist that his AMS is result of ESLD. He is not felt a  candidate for LTX.     In this setting I am asked to assist with goals of care and ACP discussions.     Discussed with Dr. Menendez at length.       Hospital Course:  No notes on file    Interval History:     Past Medical History:   Diagnosis Date    Alcoholic cirrhosis of liver with ascites     Ascites     Coagulopathy     Esophageal varices with bleeding     Hepatic encephalopathy     Mixed hyperlipidemia     Portal hypertension     Thrombocytopenia, unspecified        Past Surgical History:   Procedure Laterality Date    BICEPS TENDON REPAIR Right     femur facture Right     HERNIA REPAIR Bilateral        Review of patient's allergies indicates:  No Known Allergies    Medications:  Continuous Infusions:  Scheduled Meds:   furosemide  40 mg Oral Daily    lactulose  30 g Oral Q6H    midodrine  15 mg Oral TID WM    mupirocin   Nasal BID    pantoprazole  40 mg Oral Daily    piperacillin-tazobactam (Zosyn) IV (PEDS and ADULTS) (extended infusion is not appropriate)  4.5 g Intravenous Q8H    rifAXImin  550 mg Oral BID    spironolactone  100 mg Oral Daily    thiamine (VITAMIN B1) IVPB  500 mg Intravenous TID    vancomycin (VANCOCIN) IVPB  1,250 mg Intravenous Q12H     PRN Meds:acetaminophen, albuterol-ipratropium, dextrose 10%, dextrose 10%, dextrose, dextrose, glucagon (human recombinant), melatonin, naloxone, ondansetron, sodium chloride 0.9%, Pharmacy to dose Vancomycin consult **AND** vancomycin - pharmacy to dose    Family History       Problem Relation (Age of Onset)    Heart disease Father    Hypertension Mother          Tobacco Use    Smoking status: Former     Types: Cigarettes    Smokeless tobacco: Not on file   Substance and Sexual Activity    Alcohol use: Not Currently    Drug use: Never    Sexual activity: Yes       Review of Systems   Unable to perform ROS: Mental status change   Objective:     Vital Signs (Most Recent):  Temp: 97.9 °F (36.6 °C) (06/20/23 1137)  Pulse: 95 (06/20/23 1137)  Resp: 19 (06/20/23  1137)  BP: (!) 86/59 (06/20/23 1137)  SpO2: 100 % (06/20/23 1137) Vital Signs (24h Range):  Temp:  [97.4 °F (36.3 °C)-98.5 °F (36.9 °C)] 97.9 °F (36.6 °C)  Pulse:  [] 95  Resp:  [17-20] 19  SpO2:  [94 %-100 %] 100 %  BP: (85-96)/(52-59) 86/59     Weight: 85.7 kg (188 lb 15 oz)  Body mass index is 27.11 kg/m².       Physical Exam  Constitutional:       Comments: Somnlent, briefly arrousable. Does not track. Chronically ill appearing.    HENT:      Mouth/Throat:      Mouth: Mucous membranes are moist.      Pharynx: No oropharyngeal exudate.   Eyes:      General: Scleral icterus present.   Cardiovascular:      Rate and Rhythm: Normal rate.      Heart sounds: No murmur heard.  Pulmonary:      Effort: No respiratory distress.      Breath sounds: No wheezing.   Abdominal:      General: There is distension.      Tenderness: There is no abdominal tenderness. There is no guarding.   Musculoskeletal:         General: No swelling or deformity.      Cervical back: No rigidity or tenderness.   Skin:     General: Skin is warm.      Coloration: Skin is jaundiced.   Neurological:      General: No focal deficit present.      Motor: Weakness present.      Comments: Asterixis present. Can not hold head up, opens eyes briefly but drifts to sleep. Can not verbalize.           Review of Symptoms      Symptom Assessment (ESAS 0-10 Scale)  Pain:  0  Dyspnea:  0  Anxiety:  0  Nausea:  0  Depression:  0  Anorexia:  0  Fatigue:  0  Insomnia:  0  Restlessness:  0  Agitation:  0  Unable to complete assessment due to Mental status change         Pain Assessment in Advanced Demential Scale (PAINAD)   Breathing - Independent of vocalization:  0  Negative vocalization:  0  Facial expression:  0  Body language:  0  Consolability:  0  Total:  0    Living Arrangements:  Lives with spouse    Psychosocial/Cultural:   See Palliative Psychosocial Note: No  Social Issues Identified: Coping deficit pt/family  Bereavement Risk: No  Caregiver Needs  Discussed. Caregiver Distress: Yes: Intensity of family caregiving  Cultural: none  **Primary  to Follow**  Palliative Care  Consult: No    Spiritual:  F - Ramya and Belief:  Yes  I - Importance:  Yes  C - Community:  Yes  A - Address in Care:  Yes     Time-Based Charting:  No      Advance Care Planning   Advance Directives:   Living Will: No    LaPOST: No    Do Not Resuscitate Status: No    Medical Power of : No      Decision Making:  Family answered questions  Goals of Care: The family and wife endorses that what is most important right now is to focus on comfort and QOL  and avoiding suffering. Hopes to improve condition but worry that it will not get better    Accordingly, we have decided that the best plan to meet the patient's goals includes continuing with treatment and pivot to comfort-focused care       Significant Labs: CBC:   Recent Labs   Lab 06/19/23  1148 06/20/23 0521   WBC 10.16 9.14   HGB 8.3* 7.4*   HCT 24.7* 22.1*   * 107*     CMP:   Recent Labs   Lab 06/19/23  0806 06/20/23 0521   * 127*   K 4.3 3.6   CL 91* 87*   CO2 26 30*   * 112*   BUN 8 8   CREATININE 0.5 0.7   CALCIUM 9.4 8.7   PROT 6.5 5.6*   ALBUMIN 2.5* 2.6*   BILITOT 14.3* 13.3*   ALKPHOS 185* 150*   * 88*   ALT 69* 63*   ANIONGAP 15 10     Coagulation:   Recent Labs   Lab 06/20/23 0521   INR 2.2*  2.2*     CBC:   Recent Labs   Lab 06/20/23 0521   WBC 9.14   HGB 7.4*   HCT 22.1*   *   *     BMP:  Recent Labs   Lab 06/20/23 0521   *   *   K 3.6   CL 87*   CO2 30*   BUN 8   CREATININE 0.7   CALCIUM 8.7     LFT:  Lab Results   Component Value Date    AST 88 (H) 06/20/2023    ALKPHOS 150 (H) 06/20/2023    BILITOT 13.3 (H) 06/20/2023     Albumin:   Albumin   Date Value Ref Range Status   06/20/2023 2.6 (L) 3.5 - 5.2 g/dL Final     Protein:   Total Protein   Date Value Ref Range Status   06/20/2023 5.6 (L) 6.0 - 8.4 g/dL Final     Lactic acid:   No  results found for: LACTATE    Significant Imaging: CXR: I have reviewed all pertinent results/findings within the past 24 hours:           A total of 25 min was spent on advance care planning, goals of care discussion, emotional support, formulating and communicating prognosis and goals of care, exploring burden/benefit of various approaches of treatment.     Matthew Castañeda MD  Palliative Medicine  [facility] - Intensive Care ([facility]

## 2023-06-20 NOTE — ASSESSMENT & PLAN NOTE
Mr. Rivers is a 58 year-old male with a history of alcoholic cirrhosis complicated by hepatic encephalopathy. Presented to OSH due to encephalopathy and hypotension. Per his wife, he was taking lactulose at home, however was not having adequate bowel movements (only one, every other day). She states that with prior episodes of encephalopathy, he was disoriented to place only. This episode appears to be worse. On presentation to OSH, he was oriented to person and place, but not to time and situation. This has worsened progressively to now being A&Ox0. He has also become progressively more drowsy. Was following commands upon transfer to [facility] on 6/15, however he is no longer doing so. Currently being treated for hepatic encephalopathy with lactulose and rifaximin. He is A&Ox0 and doesn't follow commands, moves all four extremities, upper motor neuron signs are absent, displays asterixis, and there is no gaze deviation on exam. Neurology consulted for rule-out of Wernicke's encephalopathy vs seizure.    Recommendations  -please obtain continuous EEG to rule out seizure  -due to patient not following commands, ability to evaluate for Wernicke's encephalopathy (ataxia, opthlamoplegia, nystagmus) is quite limited  ->agree with treatment of presumed Wernicke's with IV thiamine  ->agree with obtaining thiamine level  -continue treatment of hepatic encephalopathy  -continue treatment/correction of other metabolic abnormalities  ->can add TSH  -can hold off on MRI brain at this time due to lack of focal neurologic deficit on exam  ->would reconsider if EEG has focal findings  -would consider LP if clinical condition continue to deteriorate    Neurology will continue to follow at this time for results of EEG. Please contact us with any questions.

## 2023-06-20 NOTE — ASSESSMENT & PLAN NOTE
Palliative Care Encounter / Goals of care discussion:     Narrative:   Cornelio Rivers is a 58 y.o. male patient with ESLD, MELD 35 >> 25, admitted with acute mental status changes in the setting of presumed SBP, LETICIA. Has persistent MS changes despite aggressive management of SBP and HE. Workup for alternative reasons for AMS underway, EEG pending and Neuro consult requested.   Not felt a LTX candidate due to poor social support, ongoing ETOH and severe MS changes.        1: Psychosocial :     - Marital status: , met with wife Michelle [phone]   - Children: 4 grown children live close to the family home in [place]  - Profession: HVAC tech    2: Medicolegal / Advance Care Planning     - Decision making Capacity: does not have capacity   - Advance directive: not on file - conversations between the couple have been had in the past   - Surrogate Decision Maker: wife Michelle    3: Support System:    - Spiritual: yes  - Family: limited but supportive.      4: Prognostication: felt to have poor prognosis with very limited life expectancy    5: Prior Goals of Care discussions: Dr. Menendez had discussed grim prognosis earlier today.     6: Goals of care Decisions / Symptom Management / Recommendations / Plan:     1: Encounter for Palliative Care    - Code Status: Full Code (wife expressed wishes to not undergo life support or resuscitation - DNR decided today)    - Present for discussion: Wife is at the bedside    - Experience with critical illness: limited, but wife has been through a lot with pt. Past ESLD exacerbations.     - Insight in Disease and Illness trajectory: Ms. Rivers is aware that the pt. Has end stage liver disease and tells me that he can not get a transplant in his condition. She tells me about her conversation with Dr. Menendez earlier today and that she was made aware of a severely limited life expectancy. It came as a shock but not a surprise as she felt he was getting sicker.   She feels  "overwhelmed and distraught and has not been able to think past this hospitalization.      - Goals of care discussion:    - reviewed findings and expressed concerns that we may not be able to reverse the pt. Persistent and severe AMS.    - outlined that we hope that EEG and neuro eval will give additional treatment options, but also worry that his severe MS impairment may persist due to end stage liver disease - she agrees   - Wife tells me that she knew he was getting sicker over the past months and that her  also told her that he knew something was wrong...     - We discussed goals and limitations of care. Michelle tells me that they spoke about "what if" in the past and that her  never wanted to live like a vegetable. He did not want to go on machines.    - We agreed that in this setting DNR would be most appropriate and reflect the pt. Wishes and also prevent potential suffering without benefit.      - We also agreed to await formal evaluation by neurology to then decide the next steps in care. Wife is aware about hospice services and is open to hearing more once all the evaluations are available.     - Goals of Care: await Neurology evaluation.    No machines or CPR - DNR    - Approach to treatment:     - DNR entered in the chart and d/w Dr. Menendez.     2: Symptom Management:     - Pain: does not appear to be in pain  - Dyspnea: no breathless  - Anxiety: no anxiety    3: ESLD     - ETOH related, ongoing abuse  - MELD 35>> 25 driven by bilirubin  - not felt a transplant candidate  - history of variceal bleed and portal HTN (banding in the past_  - ascites s/p paracentesis and empiric therapy for SBP    4: AMS:  - evaluation underway - EEG and Neuro Consult to rule out seizures  - concerns that this is persistent severe HE vs. Other encephalopathy      6: Summary and Recommendation:     - DNR   - ongoing conversations about disposition once specialists eval complete.      - Recommendations were " discussed with Dr. estrada    7: Follow up plans:    Will follow    Thank you for your consult. Please call [phone] with questions.

## 2023-06-20 NOTE — PROGRESS NOTES
[facility] - Intensive Care ([address] Medicine  Progress Note    Patient Name: Cornelio Rivers  MRN: [id number]  Patient Class: IP- Inpatient   Admission Date: 6/15/2023  Length of Stay: 5 days  Attending Physician: Alexsandra Menendez MD  Primary Care Provider: Primary Doctor No        Subjective:     Principal Problem:Decompensated hepatic cirrhosis        HPI:  Cornelio Rivers is a 58-year-old male with a history of alcoholic cirrhosis diagnosed in 2020 complicated by portal hypertension, bleeding esophageal varices s/p banding in 2020, ascites, thrombocytopenia, coagulopathy, hepatic encephalopathy, SBP, alcohol use, and hyperlipidemia who presents as a transfer from [facility] for management of decompensated liver cirrhosis. Patient was admitted to [facility] on June 2 with altered mental status, increasing weakness, low blood pressure, poor appetite, and possible pneumonia.  He had ammonia level 102 and a MELD score 32 (sodium 118, INR 2.2, total bilirubin 16.3, creatinine 1.02). Chest x-ray on admission had mild interstitial edema versus pneumonitis with findings suggestive of developing airspace disease or atelectasis in the right chest.  Initially he had hypotension with concern for septic shock and was treated in the ICU with pressors.  He was treated with broad-spectrum antibiotics.  Hemoglobin decreased during his stay and he received packed red blood cells, but he did not have signs of GI bleeding.  INR increased during his stay and he received vitamin K/FFP.  He gradually weaned off pressors and was transferred out of the ICU on June 8.  Mental status has not improved, and he remains intermittently lethargic.  Bilirubin was stable to slightly improved, but INR has worsened.  He was empirically started on Rocephin for SBP prophylaxis (no paracentesis with elevated INR//blood cultures with no growth so far).  He has persistent abdominal distention.  Current medications  include Xifaxan and lactulose, Protonix, midodrine, ceftriaxone and Lasix/Aldactone.  Last alcohol use was noted to be about 2 months ago. Current MELD score 29.  Referring team spoke with Hepatology at [facility] with plan to transfer for further evaluation.   Current diagnoses include hepatic encephalopathy, alcoholic hepatitis, coagulopathy, and anemia.     June 13: Sodium 141, potassium 3.4, chloride 105 CO2 30, BUN 11, creatinine 0.51, glucose 103, total bilirubin 13.8, , ALT 75, INR 3, white blood cells 8.3, hemoglobin 8.4, hematocrit 25.5, platelets 89     June 12: Sodium 142, potassium 3, chloride 106, CO2 29, BUN 10, creatinine 0.59, glucose 125, calcium 9.5, magnesium 1.64, bilirubin 13.5, , ALT 78, white blood cells 7.4, hemoglobin 8.3, hematocrit 25.5, platelets 109, INR 2.9     June 9:  Right upper extremity Doppler venous ultrasound had no DVT  -chest x-ray had stable patchy bilateral pulmonary infiltrates.     June 2: COVID negative, influenza negative  -gallbladder ultrasound noted moderate volume ascites.  Thick-walled gallbladder seen, nonspecific.  Internal gallbladder sludge.  Chest x-ray had mild interstitial edema or pneumonitis with findings suggestive of developing airspace disease or atelectasis in the right chest.  -CT head had no evidence of acute intracranial hemorrhage.  Some microvascular disease is present.     February 16, 2023: EGD had no significant recurrence of varices in the esophagus.  Moderate portal hypertensive gastropathy with friable mucosa.    During my interview upon arrival to [facility], patient with waxing and waning mental status. He open eyes upon calling name, speaks few words but then falls back to sleep. He is oriented to person only as he was able to tell his name and his wife's name correctly who is present at bedside. He is able to follow simple commands like open his mouth upon asking. Wife states patient had a long history of alcohol use prior to  2020 when he developed acute esophageal variceal bleeding and diagnosed with alcoholic liver cirrhosis. He has been following with local GI doctor Dr. Gallegos in [place] for cirrhosis. His last hospital admission was in January of this year when he was admitted with SBP and had 5 liter paracentesis. He was discharged home on course of prednisone and ciprofloxacin at that time. Wife states patient cut down alcohol since 2020 and his last alcohol use about 2-3 months ago. Wife noticed overall declining about 2 weeks prior to this hospital admission as he was getting more weak, confused, lethargy, losing weight and muscle mass, decreased appetite. On June 2nd wife noticed his blood pressure to be low in 70s when she drove him to [facility]. Denies tobacco, IVDU or other illicit drug use. He worked as a .          Overview/Hospital Course:  No notes on file    Interval History:   6/20: no improvement in mental status    Review of Systems   Reason unable to perform ROS: lethargy and confusion.   Objective:     Vital Signs (Most Recent):  Temp: 97.6 °F (36.4 °C) (06/20/23 1535)  Pulse: 99 (06/20/23 1535)  Resp: 20 (06/20/23 1535)  BP: (!) 93/57 (06/20/23 1535)  SpO2: 100 % (06/20/23 1535) Vital Signs (24h Range):  Temp:  [97.6 °F (36.4 °C)-98.5 °F (36.9 °C)] 97.6 °F (36.4 °C)  Pulse:  [] 99  Resp:  [17-20] 20  SpO2:  [94 %-100 %] 100 %  BP: (85-96)/(52-59) 93/57     Weight: 85.7 kg (188 lb 15 oz)  Body mass index is 27.11 kg/m².    Intake/Output Summary (Last 24 hours) at 6/20/2023 1538  Last data filed at 6/20/2023 1331  Gross per 24 hour   Intake 0 ml   Output 725 ml   Net -725 ml         Physical Exam  Constitutional:       General: He is not in acute distress.     Appearance: He is well-developed. He is ill-appearing. He is not diaphoretic.   HENT:      Head: Normocephalic and atraumatic.      Nose: Nose normal.      Mouth/Throat:      Pharynx: No oropharyngeal exudate.   Eyes:       General: No scleral icterus.     Conjunctiva/sclera: Conjunctivae normal.      Pupils: Pupils are equal, round, and reactive to light.   Neck:      Thyroid: No thyromegaly.      Vascular: No JVD.      Trachea: No tracheal deviation.   Cardiovascular:      Rate and Rhythm: Normal rate and regular rhythm.      Heart sounds: Normal heart sounds. No murmur heard.  Pulmonary:      Effort: Pulmonary effort is normal. No respiratory distress.      Breath sounds: Rhonchi present. No wheezing or rales.   Chest:      Chest wall: No tenderness.   Abdominal:      General: Bowel sounds are normal. There is distension (moderate distention with ascites).      Palpations: Abdomen is soft. There is no mass.      Tenderness: There is abdominal tenderness (mild diffuse TTP w/o guarding). There is no guarding or rebound.      Hernia: A hernia (reducilble umbilical hernia) is present.   Musculoskeletal:         General: No tenderness.      Cervical back: Normal range of motion and neck supple.      Right lower leg: Edema present.      Left lower leg: Edema present.   Lymphadenopathy:      Cervical: No cervical adenopathy.   Skin:     General: Skin is warm and dry.      Coloration: Skin is jaundiced.      Findings: No erythema or rash.   Neurological:      Mental Status: He is alert.      Cranial Nerves: No cranial nerve deficit.      Motor: No abnormal muscle tone.      Coordination: Coordination normal.      Deep Tendon Reflexes: Reflexes are normal and symmetric. Reflexes normal.      Comments: Oriented to person only, waxing and waning mental status. Able to follow simple commands and speaks few words. Limited neuro status due to unable to lack of patient cooperation.            Significant Labs: All pertinent labs within the past 24 hours have been reviewed.    Significant Imaging: I have reviewed all pertinent imaging results/findings within the past 24 hours.      Assessment/Plan:      * Decompensated hepatic cirrhosis  Alcoholic  liver cirrhosis with ascites   Portal hypertension   Esophageal varices w/o bleeding   Hepatic encephalopathy   Coagulopathy   Thrombocytopenia     -admitted to [facility] ICU on 6/02 for presumed septic shock and hepatic encephalopathy (ammonia 102)  -treated with pressors, broad spectrum antibiotics and lactulose with some improvement of clinical status      MELD-Na: 24 at 6/16/2023  6:17 AM  MELD: 24 at 6/16/2023  6:17 AM  Calculated from:  Serum Creatinine: 0.6 mg/dL (Using min of 1 mg/dL) at 6/16/2023  6:17 AM  Serum Sodium: 139 mmol/L (Using max of 137 mmol/L) at 6/16/2023  6:17 AM  Total Bilirubin: 11.8 mg/dL at 6/16/2023  6:17 AM  INR(ratio): 2.0 at 6/16/2023  6:17 AM  -no signs of active bleeding or overt sepsis   -did not have paracentesis at outside hospital due to elevated INR, but empirically started on rocephin for SBP PPX  - paracentesis on 6/16 no SBP   -continue lactulose and rifaximin for hepatic encephalopathy (ammonia improved to 40)  -continue diuretics lasix and aldactone for ascites   -will check echo given elevated BNP and possible pleural effusion on CXR   -check HIV, hep panel, PETH  -continue midodrine for borderline low BP   -consider to restart nadolol for portal hypertension if BP improves   -continue protonix daily   -monitor MELD closely with daily labs   -consult hepatology         Delirium  -waxing and waning mental status   -due to decomp liver cirrhosis and prolonged hospital stay   -delirium precautions       Physical debility  -due to liver cirrhosis and prolonged hospital stay for 2 weeks   -PT evaluation and treat       Hypomagnesemia  -likely from diuretic use   -will replace with MgSO4      Hypokalemia  -due to diuretic use   -will replace with KCL IVPB X 2      Coagulopathy  -INR 2.1  -due to decomp liver cirrhosis   -no signs of bleeding   -received FFP and vitamin K at outside hospital   -continue vitamin K daily and monitor INR        Thrombocytopenia  -platelet today 133  -due to portal hypertension and splenic sequestration   -monitor trend       Hepatic encephalopathy  -improved with lactulose and rifaximin   -ammonia improved from 102 to 40  -titrate lactulose to 3-4 BMs/day       Secondary esophageal varices without bleeding  -had initial episode of bleeding in 2020 treated with banding   -no further bleeding episode since then per wife   -follows with local GI. Dr. Gallegos   -last EGD in February 2023 showed no bleeding from varices, but had portal hypertensive gastropathy   -continue protonix daily       Portal hypertension  -see above under decomp cirrhosis       Alcoholic cirrhosis of liver with ascites  -see above   -cut down alcohol in 2020 when diagnosed with esophageal varices and cirrhosis   -last alcohol use 2-3 months ago per wife   -check [facility]           VTE Risk Mitigation (From admission, onward)         Ordered     IP VTE LOW RISK PATIENT  Once         06/15/23 0148     Place sequential compression device  Until discontinued         06/15/23 0148                Discharge Planning   NICKOLAS: 6/25/2023     Code Status: DNR   Is the patient medically ready for discharge?: No    Reason for patient still in hospital (select all that apply): Patient trending condition  Discharge Plan A: Skilled Nursing Facility   Discharge Delays: None known at this time              Alexsandra Menednez MD  Department of Hospital Medicine   [facility] - Intensive Care ([facility]-)

## 2023-06-20 NOTE — PT/OT/SLP PROGRESS
"Occupational Therapy   Treatment    Name: Conrelio Rivers  MRN: [id number]  Admitting Diagnosis:  Decompensated hepatic cirrhosis       Recommendations:     Discharge Recommendations: nursing facility, skilled  Discharge Equipment Recommendations:  hospital bed, lift device, wheelchair  Barriers to discharge:  Other (Comment) (increased skilled (A) required)    Assessment:     Cornelio Rivers is a 58 y.o. male with a medical diagnosis of Decompensated hepatic cirrhosis.  He presents with the following performance deficits affecting function: weakness, impaired endurance, impaired sensation, impaired self care skills, impaired functional mobility, gait instability, impaired balance, impaired cognition, decreased coordination, decreased upper extremity function, decreased lower extremity function, decreased safety awareness, pain, decreased ROM, impaired fine motor. Pt observed with increased lethargy upon entry to room, required auditory and tactile stimulation to open eyes with eyes remaining open 50% of session. Pt was able to verbalize 3 worded statement otherwise non-verbal remainder of session. Pt required maximal-total (A) x2 people for bed mobility and seated ADL this day. Pt would continue to benefit from skilled OT services to maximize functional independence with ADLs and functional mobility, reduce caregiver burden, provide caregiver education, and facilitate safe discharge in the least restrictive environment. OT recommending SNF once medically appropriate for discharge.      Rehab Prognosis:  Good; patient would benefit from acute skilled OT services to address these deficits and reach maximum level of function.       Plan:     Patient to be seen 3 x/week to address the above listed problems via self-care/home management, therapeutic activities, therapeutic exercises, neuromuscular re-education  Plan of Care Expires: 07/08/23  Plan of Care Reviewed with: patient, spouse    Subjective   "No I'm okay"  Chief " Complaint: none stated  Patient/Family Comments/goals: to get better per wife  Pain/Comfort:  Pain Rating 1:  (pt did not rate, min facial grimacing observed with bed mobility)  Location 1:  (unspecified)  Pain Addressed 1: Reposition, Distraction, Cessation of Activity  Pain Rating Post-Intervention 1:  (pt did not rate)    Objective:     Communicated with: Rosa THORNE prior to session.  Patient found HOB elevated with Condom Catheter, peripheral IV, pressure relief boots, blood pressure cuff, oxygen upon OT entry to room.    General Precautions: Standard, fall, aspiration, pureed diet    Orthopedic Precautions:N/A  Braces: N/A  Respiratory Status: Nasal cannula, flow 3 L/min     Occupational Performance:     Bed Mobility:    Dependent drawsheet transfer toward [facility] in trendelenberg x2 people  Patient completed Rolling/Turning to Right with total assistance  Patient completed Scooting anteriorly on EOB x2 trials with total assistance  Patient completed Supine to Sit with total assistance and 2 persons  Patient completed Sit to Supine with total assistance and 2 persons   Pt tolerated sitting edge of bed for ~6 minutes with maximal-total (A)  Verbal/tactile cueing required to eliminate posterior lean however pt unable    Functional Mobility/Transfers:  STS t/f NT due to poor sitting balance    Activities of Daily Living:  Upper Body Dressing: maximal assistance required to don/doff hospital gown anteriorly sitting EOB, total (A) for sitting balance      [facility] 6 Click ADL: 6    Treatment & Education:  -Education on energy conservation and task modification to maximize safety and (I) during ADLs and mobility  -Education on importance of OOB activity to improve overall activity tolerance and promote recovery  -Pt educated to call for assistance and to transfer with hospital staff only  -Provided education regarding role of OT, POC, & discharge recommendations with pt & spouse verbalizing understanding.  Pt's spouse had no  further questions & when asked whether there were any concerns pt reported none.     Patient left HOB elevated with all lines intact, call button in reach, RN notified, and MD present    GOALS:   Multidisciplinary Problems       Occupational Therapy Goals          Problem: Occupational Therapy    Goal Priority Disciplines Outcome Interventions   Occupational Therapy Goal     OT, PT/OT Ongoing, Progressing    Description: Goals to be met by: 7/8/23     Patient will increase functional independence with ADLs by performing:    Grooming while seated with Moderate Assistance.  Sitting at edge of bed x5 minutes with Moderate Assistance.  Rolling to Right, Left with Moderate Assistance.   Supine to sit with Maximum Assistance.  Upper extremity exercise program x10 reps per handout, with assistance as needed.                         Time Tracking:     OT Date of Treatment: 06/20/23  OT Start Time: 0946  OT Stop Time: 1009  OT Total Time (min): 23 min    Billable Minutes:Self Care/Home Management 8  Neuromuscular Re-education 15    OT/MARIELENA: OT          6/20/2023

## 2023-06-20 NOTE — NURSING
Pt is alert and oriented to self only this shift NSR on monitor and oxygen at 3L nasal cannula. IV Antibiotics given. External Catheter in place . Patient has not taken anything by mouth this shift. Pt still hypotensive. No new events overnight.

## 2023-06-20 NOTE — HPI
Mr. Rivers is a 58 year-old male with a history of alcoholic cirrhosis complicated by portal hypertension, esophageal varices and variceal bleed, ascites, coagulopathy and thrombocytopenia, hepatic encephalopathy and spontaneous bacterial peritonitis. His wife is present at bedside and provides the history. He originally presented to [facility] on 6/2 for altered mental status and hypotension. His cognitive baseline is alert and oriented to person, place, time, and situation. He is fully functional at baseline. He takes lactulose at home, however for several days prior to presenting to hospital, he was not having bowel movements, and became progressively more confused. Mr. Rivers was admitted for decompensated cirrhosis with hepatic encephalopathy. Due to hypotension, he was admitted to the MICU, and started on vasopressors and broad-spectrum antibiotics due to presumed septic shock. Was-stepped down from MICU on 6/8. Per his wife, during previous episodes of hepatic encephalopathy, he was at worst only disoriented to time: maintained orientation to person, place, and situation. On admission to [facility], she states that he was oriented only to person and place. This gradually deteriorated to being disoriented to person and place as well, and over the last week she states that he has become progressively more drowsy. Was transferred [place] [facility] [place]15 for hepatology evaluation. On arrival, was reportedly oriented to person and following simple commands. However, over the last several days, drowsiness has worsened, and he is now not following commands. Ammonia at OSH was 120, repeat on 6/19 of 40. Currently on lactulose and rifaximin, as well as vancomycin and Zosyn, and iv thiamine. His wife denies that he has a personal or family history of seizures. Denies personal history of cancer. Denies prior drug use. States that he has been sober for approximately 3 months, but prior to this drank 1/5th  "of vodka or other hard liquor daily. She does state that in 2015, he had several transient episodes of left-sided numbness and altered temperature sensation (described as "coldness"): he presented to [facility], and his wife states that he was told that he had a stroke, however on outpatient follow-up in [facility]/repeat MRI, they were told that this was not the case. He does have a history of concussion secondary to trauma (falling off a horse in his 20-30s)--wife states that he has fallen at home but denies known head-strike. No known congential anatomic abnormalities of the CNS. Neurology consulted for rule out of Wernicke's encephalopathy vs seizure.  "

## 2023-06-20 NOTE — PLAN OF CARE
06/20/23 1411   Post-Acute Status   Post-Acute Authorization Placement   Post-Acute Placement Status Referrals Sent   Discharge Plan   Discharge Plan A Skilled Nursing Facility     TIFFANY spoke  Janay  admissions coordinator at [facility] Phone: [phone]  # [phone] and requesting information re: the frequency of paracentesis and the patient will be receiving paracentesis every other week.      CM updated wife re the above.      Dilia Aguirre RN  Case Management  [facility]  [phone]\

## 2023-06-20 NOTE — PLAN OF CARE
Problem: Adult Inpatient Plan of Care  Goal: Plan of Care Review  Outcome: Ongoing, Progressing  Goal: Patient-Specific Goal (Individualized)  Outcome: Ongoing, Progressing  Goal: Absence of Hospital-Acquired Illness or Injury  Outcome: Ongoing, Progressing  Goal: Optimal Comfort and Wellbeing  Outcome: Ongoing, Progressing  Goal: Readiness for Transition of Care  Outcome: Ongoing, Progressing     Problem: Fall Injury Risk  Goal: Absence of Fall and Fall-Related Injury  Outcome: Ongoing, Progressing     Problem: Gas Exchange Impaired  Goal: Optimal Gas Exchange  Outcome: Ongoing, Progressing     Problem: Skin Injury Risk Increased  Goal: Skin Health and Integrity  Outcome: Ongoing, Progressing     Problem: Impaired Wound Healing  Goal: Optimal Wound Healing  Outcome: Ongoing, Progressing

## 2023-06-20 NOTE — CONSULTS
[facility] ([place] [id number])  Neurology  Consult Note    Patient Name: Cornelio Rivers  MRN: [id number]  Admission Date: 6/15/2023  Hospital Length of Stay: 5 days  Code Status: DNR   Attending Provider: Alexsandra Menendez MD   Consulting Provider: Bhavesh Quesada DO  Primary Care Physician: Primary Doctor No  Principal Problem:Decompensated hepatic cirrhosis    Consults   Subjective:     Chief Complaint:  encephalopathy     HPI:   Mr. Rivers is a 58 year-old male with a history of alcoholic cirrhosis complicated by portal hypertension, esophageal varices and variceal bleed, ascites, coagulopathy and thrombocytopenia, hepatic encephalopathy and spontaneous bacterial peritonitis. His wife is present at bedside and provides the history. He originally presented to [facility] on 6/2 for altered mental status and hypotension. His cognitive baseline is alert and oriented to person, place, time, and situation. He is fully functional at baseline. He takes lactulose at home, however for several days prior to presenting to hospital, he was not having bowel movements, and became progressively more confused. Mr. Rivers was admitted for decompensated cirrhosis with hepatic encephalopathy. Due to hypotension, he was admitted to the MICU, and started on vasopressors and broad-spectrum antibiotics due to presumed septic shock. Was-stepped down from MICU on 6/8. Per his wife, during previous episodes of hepatic encephalopathy, he was at worst only disoriented to time: maintained orientation to person, place, and situation. On admission to [facility], she states that he was oriented only to person and place. This gradually deteriorated to being disoriented to person and place as well, and over the last week she states that he has become progressively more drowsy. Was transferred to [facility] on 6/15 for hepatology evaluation. On arrival, was reportedly oriented to person and following simple commands. However,  "over the last several days, drowsiness has worsened, and he is now not following commands. Ammonia at OSH was 120, repeat on 6/19 of 40. Currently on lactulose and rifaximin, as well as vancomycin and Zosyn, and iv thiamine. His wife denies that he has a personal or family history of seizures. Denies personal history of cancer. Denies prior drug use. States that he has been sober for approximately 3 months, but prior to this drank 1/5th of vodka or other hard liquor daily. She does state that in 2015, he had several transient episodes of left-sided numbness and altered temperature sensation (described as "coldness"): he presented to [facility], and his wife states that he was told that he had a stroke, however on outpatient follow-up in [facility]/repeat MRI, they were told that this was not the case. He does have a history of concussion secondary to trauma (falling off a horse in his 20-30s)--wife states that he has fallen at home but denies known head-strike. No known congential anatomic abnormalities of the CNS. Neurology consulted for rule out of Wernicke's encephalopathy vs seizure.       Past Medical History:   Diagnosis Date    Alcoholic cirrhosis of liver with ascites     Ascites     Coagulopathy     Esophageal varices with bleeding     Hepatic encephalopathy     Mixed hyperlipidemia     Portal hypertension     Thrombocytopenia, unspecified        Past Surgical History:   Procedure Laterality Date    BICEPS TENDON REPAIR Right     femur facture Right     HERNIA REPAIR Bilateral        Review of patient's allergies indicates:  No Known Allergies      No current facility-administered medications on file prior to encounter.     Current Outpatient Medications on File Prior to Encounter   Medication Sig    ciprofloxacin HCl (CIPRO) 500 MG tablet Take 500 mg by mouth Daily.    furosemide (LASIX) 40 MG tablet Take 40 mg by mouth 2 (two) times daily.    lactulose (CHRONULAC) 20 gram/30 " "mL Soln 15 ml    mirtazapine (REMERON) 7.5 MG Tab Take 7.5 mg by mouth every evening.    nadoloL (CORGARD) 40 MG tablet Take 40 mg by mouth once daily.    pantoprazole (PROTONIX) 40 MG tablet Take 40 mg by mouth once daily.    spironolactone (ALDACTONE) 100 MG tablet Take 100 mg by mouth once daily.    XIFAXAN 550 mg Tab Take 550 mg by mouth 2 (two) times daily.    meclizine (ANTIVERT) 25 mg tablet Take 25 mg by mouth daily as needed.     Family History       Problem Relation (Age of Onset)    Heart disease Father    Hypertension Mother          Tobacco Use    Smoking status: Former     Types: Cigarettes    Smokeless tobacco: Not on file   Substance and Sexual Activity    Alcohol use: Not Currently    Drug use: Never    Sexual activity: Yes     Review of Systems   Unable to perform ROS: Mental status change   Objective:     Vital Signs (Most Recent):  Temp: 97.9 °F (36.6 °C) (06/20/23 1137)  Pulse: 95 (06/20/23 1137)  Resp: 19 (06/20/23 1137)  BP: (!) 86/59 (06/20/23 1137)  SpO2: 100 % (06/20/23 1137) Vital Signs (24h Range):  Temp:  [97.4 °F (36.3 °C)-98.5 °F (36.9 °C)] 97.9 °F (36.6 °C)  Pulse:  [] 95  Resp:  [17-20] 19  SpO2:  [94 %-100 %] 100 %  BP: ()/(51-59) 86/59     Weight: 85.7 kg (188 lb 15 oz)  Body mass index is 27.11 kg/m².     Physical Exam   Neurological Exam:  MENTAL STATUS  Level of consciousness: drowsy, opens eyes spontaneously but not to command; does not follow commands  Orientation: disoriented to person, place, time, situation  Withdraws to pain in all four extremities, verbalizes "no, stop" to painful stimuli   No gaze deviation  No posturing observed    CRANIAL NERVES  CN III: PERRL  ->blinks to threat  ->unable to assess EOM due to inability to follow commands, but no gaze deviation noted  ->no nystagmus noted  CN VII: facial expression symmetric and full  CN IX, X: symmetric palate elevation; phonation normal  CN XI: shoulder shrug and head turn intact " bilaterally  CN XII: tongue midline, no deviation upon protrusion, no atrophy    MOTOR EXAM  Muscle bulk: normal  Muscle tone: normal  Intermittently moves all four extremities  No rigidity on passive movement, though patient will intermittently oppose passive manipulation  Asterixis present    Biceps, patellar, and achilles reflexes 2+ bilaterally, though delayed  Planter reflex: down-going bilaterally.  Lopez's sign negative bilaterally.    SENSORY EXAM  Withdraws to pain in all four extremities.    COORDINATION  Unable to assess due to acuity of condition.           Significant Labs: All pertinent lab results from the past 24 hours have been reviewed.    Significant Imaging: I have reviewed all pertinent imaging results/findings within the past 24 hours.    Assessment and Plan:     Encephalopathy  Mr. Rivers is a 58 year-old male with a history of alcoholic cirrhosis complicated by hepatic encephalopathy. Presented to OSH due to encephalopathy and hypotension. Per his wife, he was taking lactulose at home, however was not having adequate bowel movements (only one, every other day). She states that with prior episodes of encephalopathy, he was disoriented to place only. This episode appears to be worse. On presentation to OSH, he was oriented to person and place, but not to time and situation. This has worsened progressively to now being A&Ox0. He has also become progressively more drowsy. Was following commands upon transfer to [facility] on 6/15, however he is no longer doing so. Currently being treated for hepatic encephalopathy with lactulose and rifaximin. He is A&Ox0 and doesn't follow commands, moves all four extremities, upper motor neuron signs are absent, displays asterixis, and there is no gaze deviation on exam. Neurology consulted for rule-out of Wernicke's encephalopathy vs seizure.    Recommendations  -please obtain continuous EEG to rule out seizure  -due to patient not following commands, ability to  evaluate for Wernicke's encephalopathy (ataxia, opthlamoplegia, nystagmus) is limited  ->agree with treatment of presumed Wernicke's with IV thiamine  ->agree with obtaining thiamine level  -continue treatment of hepatic encephalopathy  -continue treatment/correction of other metabolic abnormalities  ->can add TSH  -can hold off on MRI brain at this time due to lack of focal neurologic deficit on exam  ->would reconsider if EEG has focal findings  -would consider LP if clinical condition continues to deteriorate    Neurology will continue to follow at this time for results of EEG. Please contact us with any questions.        VTE Risk Mitigation (From admission, onward)         Ordered     IP VTE LOW RISK PATIENT  Once         06/15/23 0148     Place sequential compression device  Until discontinued         06/15/23 0148                Thank you for your consult. I will follow-up with patient. Please contact us if you have any additional questions.    Bhavesh Quesada, DO  Neurology  [facility] ([place]

## 2023-06-20 NOTE — PT/OT/SLP PROGRESS
Speech Language Pathology Treatment    Patient Name:  Cornelio Rivers   MRN:  [id number]  Admitting Diagnosis: Decompensated hepatic cirrhosis    Recommendations:                 General Recommendations:  Dysphagia therapy  Diet recommendations:  Pleasure Feeding, Puree Diet - IDDSI Level 4, Liquid Diet Level: Thin liquids - IDDSI Level 0   Aspiration Precautions: 1 bite/sip at a time, Eliminate distractions, Feed only when awake/alert, HOB to 90 degrees, Meds crushed in puree, Puree for pleasure, Small bites/sips, and Strict aspiration precautions   General Precautions: Standard, fall, aspiration, pureed diet  Communication strategies:  none    Assessment:     Cornelio Rivers is a 58 y.o. male with an SLP diagnosis of Dysphagia.  He presents with continued lethargy.    Subjective     Pt asleep upon SLP entry. Pt's family member present at bedside. Pt difficult to rouse.     Pain/Comfort:  Pain Rating 1: 0/10    Respiratory Status: Nasal cannula    Objective:     Has the patient been evaluated by SLP for swallowing?   Yes  Keep patient NPO? No      Pt asleep upon SLP entry. He required max verbal, tactile, and environmental stim to rouse. Rousal was fleeting though he did maintain JOHN long enough to consume a single straw sip of water without overt s/s aspiration or airway compromise demonstrated. Pt's family member endorsed continued waxing and waning alertness, with pt mainly consuming pureed consistencies during moments of wakefulness. SLP explained multiple risk factors for aspiration and only feeding the pt for pleasure purposes if he is awake/alert/accepting and seated with HOB completely upright. Pt's family member expressed agreement and understanding. Continue to monitor for signs and symptoms of aspiration and discontinue oral feeding should you notice any of the following: watery eyes, reddened facial area, wet vocal quality, increased work of breathing, change in respiratory status, increased congestion, coughing,  fever, etc.    Goals:   Multidisciplinary Problems       SLP Goals          Problem: SLP    Goal Priority Disciplines Outcome   SLP Goal     SLP Ongoing, Progressing   Description: Speech Language Pathology Goals  Goals expected to be met by 6/30    1. Pt will tolerate mechanical soft solids and thin liquids without any overt s/sx of airway compromise.  2. Pt will participate in ongoing swallow assessment to determine least restrictive PO diet.                                   Plan:     Patient to be seen:  3 x/week   Plan of Care expires:  07/16/23  Plan of Care reviewed with:  patient, spouse   SLP Follow-Up:  Yes       Discharge recommendations:  nursing facility, skilled    Time Tracking:     SLP Treatment Date:   06/20/23  Speech Start Time:  0936  Speech Stop Time:  0949     Speech Total Time (min):  13 min    Billable Minutes: Treatment Swallowing Dysfunction 5 and Self Care/Home Management Training 8    06/20/2023

## 2023-06-21 PROBLEM — E44.0 MODERATE MALNUTRITION: Status: ACTIVE | Noted: 2023-06-21

## 2023-06-21 LAB
ALBUMIN SERPL BCP-MCNC: 2.4 G/DL (ref 3.5–5.2)
ALP SERPL-CCNC: 153 U/L (ref 55–135)
ALT SERPL W/O P-5'-P-CCNC: 59 U/L (ref 10–44)
ANA SER QL IF: NORMAL
ANION GAP SERPL CALC-SCNC: 9 MMOL/L (ref 8–16)
AST SERPL-CCNC: 87 U/L (ref 10–40)
BILIRUB SERPL-MCNC: 12.9 MG/DL (ref 0.1–1)
BUN SERPL-MCNC: 7 MG/DL (ref 6–20)
CALCIUM SERPL-MCNC: 8.3 MG/DL (ref 8.7–10.5)
CHLORIDE SERPL-SCNC: 84 MMOL/L (ref 95–110)
CO2 SERPL-SCNC: 32 MMOL/L (ref 23–29)
CREAT SERPL-MCNC: 0.7 MG/DL (ref 0.5–1.4)
EST. GFR  (NO RACE VARIABLE): >60 ML/MIN/1.73 M^2
GLUCOSE SERPL-MCNC: 104 MG/DL (ref 70–110)
HCV RNA SERPL QL NAA+PROBE: NOT DETECTED
HCV RNA SPEC NAA+PROBE-ACNC: <12 IU/ML
INR PPP: 2.2 (ref 0.8–1.2)
MITOCHONDRIA AB TITR SER IF: NORMAL {TITER}
POTASSIUM SERPL-SCNC: 3.4 MMOL/L (ref 3.5–5.1)
PROT SERPL-MCNC: 5.4 G/DL (ref 6–8.4)
PROTHROMBIN TIME: 22.8 SEC (ref 9–12.5)
SMOOTH MUSCLE AB TITR SER IF: ABNORMAL {TITER}
SODIUM SERPL-SCNC: 125 MMOL/L (ref 136–145)
VANCOMYCIN TROUGH SERPL-MCNC: 22.6 UG/ML (ref 10–22)

## 2023-06-21 PROCEDURE — 80053 COMPREHEN METABOLIC PANEL: CPT | Mod: NTX | Performed by: HOSPITALIST

## 2023-06-21 PROCEDURE — 97110 THERAPEUTIC EXERCISES: CPT | Mod: NTX,CQ

## 2023-06-21 PROCEDURE — 99232 PR SUBSEQUENT HOSPITAL CARE,LEVL II: ICD-10-PCS | Mod: NTX,,, | Performed by: PSYCHIATRY & NEUROLOGY

## 2023-06-21 PROCEDURE — 99232 SBSQ HOSP IP/OBS MODERATE 35: CPT | Mod: NTX,,, | Performed by: INTERNAL MEDICINE

## 2023-06-21 PROCEDURE — 25000003 PHARM REV CODE 250: Mod: NTX | Performed by: HOSPITALIST

## 2023-06-21 PROCEDURE — 99233 PR SUBSEQUENT HOSPITAL CARE,LEVL III: ICD-10-PCS | Mod: NTX,,, | Performed by: HOSPITALIST

## 2023-06-21 PROCEDURE — 94761 N-INVAS EAR/PLS OXIMETRY MLT: CPT | Mod: NTX

## 2023-06-21 PROCEDURE — 95720 PR EEG, W/VIDEO, CONT RECORD, I&R, >12<26 HRS: ICD-10-PCS | Mod: NTX,,, | Performed by: PSYCHIATRY & NEUROLOGY

## 2023-06-21 PROCEDURE — 63600175 PHARM REV CODE 636 W HCPCS: Mod: NTX | Performed by: HOSPITALIST

## 2023-06-21 PROCEDURE — 80202 ASSAY OF VANCOMYCIN: CPT | Mod: NTX | Performed by: HOSPITALIST

## 2023-06-21 PROCEDURE — 99232 SBSQ HOSP IP/OBS MODERATE 35: CPT | Mod: NTX,,, | Performed by: PSYCHIATRY & NEUROLOGY

## 2023-06-21 PROCEDURE — 99232 PR SUBSEQUENT HOSPITAL CARE,LEVL II: ICD-10-PCS | Mod: NTX,,, | Performed by: INTERNAL MEDICINE

## 2023-06-21 PROCEDURE — 95720 EEG PHY/QHP EA INCR W/VEEG: CPT | Mod: NTX,,, | Performed by: PSYCHIATRY & NEUROLOGY

## 2023-06-21 PROCEDURE — 20600001 HC STEP DOWN PRIVATE ROOM: Mod: NTX

## 2023-06-21 PROCEDURE — 95714 VEEG EA 12-26 HR UNMNTR: CPT | Mod: NTX

## 2023-06-21 PROCEDURE — 99233 SBSQ HOSP IP/OBS HIGH 50: CPT | Mod: NTX,,, | Performed by: HOSPITALIST

## 2023-06-21 PROCEDURE — 36415 COLL VENOUS BLD VENIPUNCTURE: CPT | Mod: NTX | Performed by: HOSPITALIST

## 2023-06-21 PROCEDURE — 85610 PROTHROMBIN TIME: CPT | Mod: NTX | Performed by: HOSPITALIST

## 2023-06-21 RX ORDER — POTASSIUM CHLORIDE 20 MEQ/1
40 TABLET, EXTENDED RELEASE ORAL ONCE
Status: COMPLETED | OUTPATIENT
Start: 2023-06-21 | End: 2023-06-21

## 2023-06-21 RX ADMIN — MUPIROCIN: 20 OINTMENT TOPICAL at 09:06

## 2023-06-21 RX ADMIN — PANTOPRAZOLE SODIUM 40 MG: 40 TABLET, DELAYED RELEASE ORAL at 09:06

## 2023-06-21 RX ADMIN — MIDODRINE HYDROCHLORIDE 15 MG: 5 TABLET ORAL at 01:06

## 2023-06-21 RX ADMIN — LACTULOSE 30 G: 10 SOLUTION ORAL at 11:06

## 2023-06-21 RX ADMIN — POTASSIUM CHLORIDE 40 MEQ: 1500 TABLET, EXTENDED RELEASE ORAL at 04:06

## 2023-06-21 RX ADMIN — PIPERACILLIN SODIUM AND TAZOBACTAM SODIUM 4.5 G: 4; .5 INJECTION, POWDER, LYOPHILIZED, FOR SOLUTION INTRAVENOUS at 11:06

## 2023-06-21 RX ADMIN — MIDODRINE HYDROCHLORIDE 15 MG: 5 TABLET ORAL at 04:06

## 2023-06-21 RX ADMIN — LACTULOSE 30 G: 10 SOLUTION ORAL at 05:06

## 2023-06-21 RX ADMIN — THIAMINE HYDROCHLORIDE 500 MG: 100 INJECTION, SOLUTION INTRAMUSCULAR; INTRAVENOUS at 03:06

## 2023-06-21 RX ADMIN — VANCOMYCIN HYDROCHLORIDE 1250 MG: 1.25 INJECTION, POWDER, LYOPHILIZED, FOR SOLUTION INTRAVENOUS at 12:06

## 2023-06-21 RX ADMIN — THIAMINE HYDROCHLORIDE 500 MG: 100 INJECTION, SOLUTION INTRAMUSCULAR; INTRAVENOUS at 09:06

## 2023-06-21 RX ADMIN — VANCOMYCIN HYDROCHLORIDE 1000 MG: 1 INJECTION, POWDER, LYOPHILIZED, FOR SOLUTION INTRAVENOUS at 04:06

## 2023-06-21 RX ADMIN — LACTULOSE 30 G: 10 SOLUTION ORAL at 06:06

## 2023-06-21 RX ADMIN — RIFAXIMIN 550 MG: 550 TABLET ORAL at 08:06

## 2023-06-21 RX ADMIN — MIDODRINE HYDROCHLORIDE 15 MG: 5 TABLET ORAL at 09:06

## 2023-06-21 RX ADMIN — PIPERACILLIN SODIUM AND TAZOBACTAM SODIUM 4.5 G: 4; .5 INJECTION, POWDER, LYOPHILIZED, FOR SOLUTION INTRAVENOUS at 04:06

## 2023-06-21 RX ADMIN — RIFAXIMIN 550 MG: 550 TABLET ORAL at 09:06

## 2023-06-21 RX ADMIN — PIPERACILLIN SODIUM AND TAZOBACTAM SODIUM 4.5 G: 4; .5 INJECTION, POWDER, LYOPHILIZED, FOR SOLUTION INTRAVENOUS at 09:06

## 2023-06-21 NOTE — PROCEDURES
[facility] EEG/VIDEO MONITORING REPORT  Cornelio Rivers  [id number]  1965    DATE OF SERVICE:  06/20/2023  DATE OF ADMISSION: 6/15/2023 12:29 AM    ADMITTING/REQUESTING PROVIDER: Wood Hardy MD    REASON FOR CONSULT:  50-year-old man with cirrhosis and decreased responsiveness with lethargy.  Evaluate for evidence of epileptiform activity.    METHODOLOGY   Electroencephalographic (EEG) recording is with electrodes placed according to the International 10-20 placement system.  Thirty two (32) channels of digital signal (sampling rate of 512/sec) including T1 and T2 was simultaneously recorded from the scalp and may include  EKG, EMG, and/or eye monitors.  Recording band pass was 0.1 to 512 hz.  Digital video recording of the patient is simultaneously recorded with the EEG.  The patient is instructed report clinical symptoms which may occur during the recording session.  EEG and video recording is stored and archived in digital format.  Activation procedures which include photic stimulation, hyperventilation and instructing patients to perform simple task are done in selected patients.   The EEG is displayed on a monitor screen and can be reviewed using different montages.  Computer assisted analysis is employed to detect spike and electrographic seizure activity.   The entire record is submitted for computer analysis.  The entire recording is visually reviewed and the times identified by computer analysis as being spikes or seizures are reviewed again.  Compresses spectral analysis (CSA) is also performed on the activity recorded from each individual channel.  This is displayed as a power display of frequencies from 0 to 30 Hz over time.   The CSA is reviewed looking for asymmetries in power between homologous areas of the scalp and then compared with the original EEG recording.     [place] CK1 software is also utilized in the review of this study.  This software suite analyzes the EEG recording in multiple domains.   Coherence and rhythmicity is computed to identify EEG sections which may contain organized seizures.  Each channel undergoes analysis to detect presence of spike and sharp waves which have special and morphological characteristic of epileptic activity.  The routine EEG recording is converted from spacial into frequency domain.  This is then displayed comparing homologous areas to identify areas of significant asymmetry.  Algorithm to identify non-cortically generated artifact is used to separate eye movement, EMG and other artifact from the EEG.      RECORDING TIMES  Start on 06/20/2023 at 11:42 a.m.  Stop on 06/21/2023 at 07:00 a.m.  A total of 19 hours and 15 minutes of EEG recording is obtained.    EEG FINDINGS  Background activity:   The background is continuous, slow, disorganized, with abundant generalized and multifocal triphasic waves seen in all quadrants which wax and wane.    There is a pushbutton activation at 16:38 p.m. by a  in the room for reasons that are not stated.  On video, the patient appears drowsy sitting up in the bed and has some irregular side-to-side jerking movements with decreased responsiveness.  There are no significant changes on the electrographic record at this time.    Sleep:  There is evidence of state transitions with the appearance of sleep architecture.    Activation procedures:   The patient is breathing on nasal cannula and will arouse and lift his head to stimulation from the EEG technician but does not answer orientation questions or reliably follow commands.    Cardiac Monitor:   Heart rate appears generally regular on a single lead EKG.    Impression:   This is an abnormal continuous EEG monitoring study because of a slow and disorganized background with waxing and waning runs of triphasic waves consistent with a moderate-severe encephalopathy with evidence of cortical irritation diffusely.  This pattern is frequently seen in the setting of hepatorenal  dysfunction as well as a variety of other toxic/metabolic/infectious disorders.  There is a pushbutton activation when the patient has irregular side-to-side jerking movements in the plane of the bed with no EEG correlate.  There are no discrete electrographic seizures.    Zuleyka Deng MD PhD [facility]  Neurology-Epilepsy  [facility].

## 2023-06-21 NOTE — TREATMENT PLAN
Hepatology Treatment Plan    Cornelio Rivers is a 58 y.o. male admitted to hospital 6/15/2023 (Hospital Day: 7) due to Decompensated hepatic cirrhosis.     Interval History  Status post evaluation by neurology with concern for on-going encephalopathy and inability to follow commands; EEG ordered and continuing treatment for possible Wernicke's encephalopathy. Also evaluated by palliative care; patient now DNR while awaiting possible improvement in mental status. Vital signs stable on nasal cannula. Labs notable for stable INR (2.2 from 2.2), worsening hyponatremia (125), normal Cr, and on-going hyperbilirubinemia (13).     Objective  Temp:  [97.6 °F (36.4 °C)-98.4 °F (36.9 °C)] 98 °F (36.7 °C) (06/21 0712)  Pulse:  [] 98 (06/21 0712)  BP: ()/(54-62) 99/60 (06/21 0712)  Resp:  [19-20] 20 (06/21 0712)  SpO2:  [97 %-100 %] 97 % (06/21 0712)    Laboratory    Lab Results   Component Value Date    WBC 9.14 06/20/2023    HGB 7.4 (L) 06/20/2023    HCT 22.1 (L) 06/20/2023     (H) 06/20/2023     (L) 06/20/2023       Lab Results   Component Value Date     (L) 06/21/2023    K 3.4 (L) 06/21/2023    CL 84 (L) 06/21/2023    CO2 32 (H) 06/21/2023    BUN 7 06/21/2023    CREATININE 0.7 06/21/2023    CALCIUM 8.3 (L) 06/21/2023       Lab Results   Component Value Date    ALBUMIN 2.4 (L) 06/21/2023    ALT 59 (H) 06/21/2023    AST 87 (H) 06/21/2023    ALKPHOS 153 (H) 06/21/2023    BILITOT 12.9 (H) 06/21/2023       Lab Results   Component Value Date    INR 2.2 (H) 06/21/2023    INR 2.2 (H) 06/20/2023    INR 2.2 (H) 06/20/2023       MELD-Na: 31 at 6/21/2023  4:24 AM  MELD: 25 at 6/21/2023  4:24 AM  Calculated from:  Serum Creatinine: 0.7 mg/dL (Using min of 1 mg/dL) at 6/21/2023  4:24 AM  Serum Sodium: 125 mmol/L at 6/21/2023  4:24 AM  Total Bilirubin: 12.9 mg/dL at 6/21/2023  4:24 AM  INR(ratio): 2.2 at 6/21/2023  4:24 AM      Assessment  This is a 58 year old male with PMH significant for ETOH cirrhosis  (reportedly diagnosed in 2020; associated with ascites, esophageal varices, and history of SBP in 04/2023 that was diagnosed at [facility]; per family: actively drinking as of 04/2023 despite knowledge of liver disease) who was admitted to [facility] on 06/15 as a transfer from [facility] for management of decompensated cirrhosis (ascites and encephalopathy with MELD in upper 20's) following initial admission on 06/02 for septic shock (unclear source; paracentesis not performed due to elevated INR). Paracentesis on admission here negative for SBP. PETH undetectable, however patient with on-going encephalopathy since admission here with inability to confirm social history to move forward with transplant evaluation. Ability to obtain head imaging limited by myoclonic activity producing motion artifact; status post evaluation by neurology on 06/20 with EEG ordered and treating for possible Wernicke's.     Recommendations    - Lactulose TID and Rifaximin BID; titrate Lactulose to 3 bowel movements daily.   - Follow-up EEG and neurology recommendations.   - Midodrine TID.   - CMP and INR daily.   - Despite high MELD score, unable to move forward with transplant evaluation due to encephalopathy preventing participation in interview regarding ETOH history. Continued goals of care discussions appropriate.     Thank you for involving us in the care of Cornelio Rivers. Please call with any additional concerns or questions.        Maxx Hurst MD, PGY-V  Gastroenterology Fellow  [facility]

## 2023-06-21 NOTE — ASSESSMENT & PLAN NOTE
Palliative Care Encounter / Goals of care discussion:     Narrative:   Cornelio Rivers is a 58 y.o. male patient with ESLD, MELD 35 >> 25, admitted with acute mental status changes in the setting of presumed SBP, LETICIA. Has persistent MS changes despite aggressive management of SBP and HE. Workup for alternative reasons for AMS underway, EEG pending and Neuro consult requested.   Not felt a LTX candidate due to poor social support, ongoing ETOH and severe MS changes.        1: Psychosocial :     - Marital status: , met with wife Michelle [phone]   - Children: 4 grown children live close to the family home in [place]  - Profession: HVAC tech    2: Medicolegal / Advance Care Planning     - Decision making Capacity: does not have capacity   - Advance directive: not on file - conversations between the couple have been had in the past   - Surrogate Decision Maker: wife Michelle    3: Support System:    - Spiritual: yes  - Family: limited but supportive.      4: Prognostication: felt to have poor prognosis with very limited life expectancy    5: Prior Goals of Care discussions: Dr. Menendez had discussed grim prognosis earlier today.     6: Goals of care Decisions / Symptom Management / Recommendations / Plan:     1: Encounter for Palliative Care    - Code Status: Full Code (wife expressed wishes to not undergo life support or resuscitation - DNR decided today)    - Present for discussion: Wife is at the bedside    - Experience with critical illness: limited, but wife has been through a lot with pt. Past ESLD exacerbations.     - Insight in Disease and Illness trajectory: Ms. Rivers is aware that the pt. Has end stage liver disease and tells me that he can not get a transplant in his condition. She tells me about her conversation with Dr. Menendez earlier today and that she was made aware of a severely limited life expectancy. It came as a shock but not a surprise as she felt he was getting sicker.   She feels  "overwhelmed and distraught and has not been able to think past this hospitalization.      - Goals of care discussion:   6/21/23   - discussed hospice with patient at length.    - clarified notions that hospice is stopping everything and withdrawing fluid and food (she has a very poor understanding of hospice services).    - expressed that hospice focuses on comfort and well being while accepting the fact that there is a terminal illness that we can not cure.    - outlined the support available, aids, nurses, chaplains and SW while also requiring the family to be the primary caregiver.      - Matt has a neighbor that had her mother in hospice and I encouraged her to reach out to discuss the experience.      - Outside of new findings from EEG it appears that the pt. MS change stems from advanced liver disease. He may be hospice eligible if no new findings present themself.       - we agreed to await final recs from neurology and DR. Menendez to make any further plans for disposition.    - will follow up in AM.       6/20/23   - reviewed findings and expressed concerns that we may not be able to reverse the pt. Persistent and severe AMS.    - outlined that we hope that EEG and neuro eval will give additional treatment options, but also worry that his severe MS impairment may persist due to end stage liver disease - she agrees   - Wife tells me that she knew he was getting sicker over the past months and that her  also told her that he knew something was wrong...     - We discussed goals and limitations of care. Michelle tells me that they spoke about "what if" in the past and that her  never wanted to live like a vegetable. He did not want to go on machines.    - We agreed that in this setting DNR would be most appropriate and reflect the pt. Wishes and also prevent potential suffering without benefit.      - We also agreed to await formal evaluation by neurology to then decide the next steps in care. " Wife is aware about hospice services and is open to hearing more once all the evaluations are available.     - Goals of Care: await Neurology evaluation.    No machines or CPR - DNR    - Approach to treatment:     - DNR entered in the chart and d/w Dr. Menendez.     2: Symptom Management:     - Pain: does not appear to be in pain  - Dyspnea: no breathless  - Anxiety: no anxiety    3: ESLD     - ETOH related, ongoing abuse  - MELD 35>> 25 driven by bilirubin  - not felt a transplant candidate  - history of variceal bleed and portal HTN (banding in the past_  - ascites s/p paracentesis and empiric therapy for SBP    4: AMS:  - evaluation underway - EEG and Neuro Consult to rule out seizures  - concerns that this is persistent severe HE vs. Other encephalopathy      6: Summary and Recommendation:     - DNR   - ongoing conversations about disposition once specialists eval complete.      - Recommendations were discussed with Dr. menendez    7: Follow up plans:    Will follow    Thank you for your consult. Please call [phone] with questions.

## 2023-06-21 NOTE — PROGRESS NOTES
[facility] - [facility] ([address] Medicine  Progress Note    Patient Name: Cornelio Rivers  MRN: [id number]  Patient Class: IP- Inpatient   Admission Date: 6/15/2023  Length of Stay: 6 days  Attending Physician: Alexsandra Menendez MD  Primary Care Provider: Primary Doctor No        Subjective:     Principal Problem:Decompensated hepatic cirrhosis        HPI:  Cornelio Rivers is a 58-year-old male with a history of alcoholic cirrhosis diagnosed in 2020 complicated by portal hypertension, bleeding esophageal varices s/p banding in 2020, ascites, thrombocytopenia, coagulopathy, hepatic encephalopathy, SBP, alcohol use, and hyperlipidemia who presents as a transfer from [facility] for management of decompensated liver cirrhosis. Patient was admitted to [facility] on June 2 with altered mental status, increasing weakness, low blood pressure, poor appetite, and possible pneumonia.  He had ammonia level 102 and a MELD score 32 (sodium 118, INR 2.2, total bilirubin 16.3, creatinine 1.02). Chest x-ray on admission had mild interstitial edema versus pneumonitis with findings suggestive of developing airspace disease or atelectasis in the right chest.  Initially he had hypotension with concern for septic shock and was treated in the ICU with pressors.  He was treated with broad-spectrum antibiotics.  Hemoglobin decreased during his stay and he received packed red blood cells, but he did not have signs of GI bleeding.  INR increased during his stay and he received vitamin K/FFP.  He gradually weaned off pressors and was transferred out of the ICU on June 8.  Mental status has not improved, and he remains intermittently lethargic.  Bilirubin was stable to slightly improved, but INR has worsened.  He was empirically started on Rocephin for SBP prophylaxis (no paracentesis with elevated INR//blood cultures with no growth so far).  He has persistent abdominal distention.  Current medications  include Xifaxan and lactulose, Protonix, midodrine, ceftriaxone and Lasix/Aldactone.  Last alcohol use was noted to be about 2 months ago. Current MELD score 29.  Referring team spoke with Hepatology at [facility] with plan to transfer for further evaluation.   Current diagnoses include hepatic encephalopathy, alcoholic hepatitis, coagulopathy, and anemia.     June 13: Sodium 141, potassium 3.4, chloride 105 CO2 30, BUN 11, creatinine 0.51, glucose 103, total bilirubin 13.8, , ALT 75, INR 3, white blood cells 8.3, hemoglobin 8.4, hematocrit 25.5, platelets 89     June 12: Sodium 142, potassium 3, chloride 106, CO2 29, BUN 10, creatinine 0.59, glucose 125, calcium 9.5, magnesium 1.64, bilirubin 13.5, , ALT 78, white blood cells 7.4, hemoglobin 8.3, hematocrit 25.5, platelets 109, INR 2.9     June 9:  Right upper extremity Doppler venous ultrasound had no DVT  -chest x-ray had stable patchy bilateral pulmonary infiltrates.     June 2: COVID negative, influenza negative  -gallbladder ultrasound noted moderate volume ascites.  Thick-walled gallbladder seen, nonspecific.  Internal gallbladder sludge.  Chest x-ray had mild interstitial edema or pneumonitis with findings suggestive of developing airspace disease or atelectasis in the right chest.  -CT head had no evidence of acute intracranial hemorrhage.  Some microvascular disease is present.     February 16, 2023: EGD had no significant recurrence of varices in the esophagus.  Moderate portal hypertensive gastropathy with friable mucosa.    During my interview upon arrival to [facility], patient with waxing and waning mental status. He open eyes upon calling name, speaks few words but then falls back to sleep. He is oriented to person only as he was able to tell his name and his wife's name correctly who is present at bedside. He is able to follow simple commands like open his mouth upon asking. Wife states patient had a long history of alcohol use prior to  2020 when he developed acute esophageal variceal bleeding and diagnosed with alcoholic liver cirrhosis. He has been following with local GI doctor Dr. Gallegos in [place] for cirrhosis. His last hospital admission was in January of this year when he was admitted with SBP and had 5 liter paracentesis. He was discharged home on course of prednisone and ciprofloxacin at that time. Wife states patient cut down alcohol since 2020 and his last alcohol use about 2-3 months ago. Wife noticed overall declining about 2 weeks prior to this hospital admission as he was getting more weak, confused, lethargy, losing weight and muscle mass, decreased appetite. On June 2nd wife noticed his blood pressure to be low in 70s when she drove him to [facility]. Denies tobacco, IVDU or other illicit drug use. He worked as a .          Overview/Hospital Course:  No notes on file    Interval History:   6/21: EEG in progress    Review of Systems   Reason unable to perform ROS: lethargy and confusion.   Objective:     Vital Signs (Most Recent):  Temp: 98 °F (36.7 °C) (06/21/23 1154)  Pulse: 82 (06/21/23 1436)  Resp: 20 (06/21/23 1154)  BP: 102/64 (06/21/23 1154)  SpO2: 97 % (06/21/23 1154) Vital Signs (24h Range):  Temp:  [98 °F (36.7 °C)-98.4 °F (36.9 °C)] 98 °F (36.7 °C)  Pulse:  [] 82  Resp:  [20] 20  SpO2:  [97 %-98 %] 97 %  BP: ()/(54-64) 102/64     Weight: 85.7 kg (188 lb 15 oz)  Body mass index is 27.11 kg/m².    Intake/Output Summary (Last 24 hours) at 6/21/2023 1536  Last data filed at 6/21/2023 1045  Gross per 24 hour   Intake 60 ml   Output 1200 ml   Net -1140 ml         Physical Exam  Constitutional:       General: He is not in acute distress.     Appearance: He is well-developed. He is ill-appearing. He is not diaphoretic.   HENT:      Head: Normocephalic and atraumatic.      Nose: Nose normal.      Mouth/Throat:      Pharynx: No oropharyngeal exudate.   Eyes:      General: No scleral icterus.      Conjunctiva/sclera: Conjunctivae normal.      Pupils: Pupils are equal, round, and reactive to light.   Neck:      Thyroid: No thyromegaly.      Vascular: No JVD.      Trachea: No tracheal deviation.   Cardiovascular:      Rate and Rhythm: Normal rate and regular rhythm.      Heart sounds: Normal heart sounds. No murmur heard.  Pulmonary:      Effort: Pulmonary effort is normal. No respiratory distress.      Breath sounds: Rhonchi present. No wheezing or rales.   Chest:      Chest wall: No tenderness.   Abdominal:      General: Bowel sounds are normal. There is distension (moderate distention with ascites).      Palpations: Abdomen is soft. There is no mass.      Tenderness: There is abdominal tenderness (mild diffuse TTP w/o guarding). There is no guarding or rebound.      Hernia: A hernia (reducilble umbilical hernia) is present.   Musculoskeletal:         General: No tenderness.      Cervical back: Normal range of motion and neck supple.      Right lower leg: Edema present.      Left lower leg: Edema present.   Lymphadenopathy:      Cervical: No cervical adenopathy.   Skin:     General: Skin is warm and dry.      Coloration: Skin is jaundiced.      Findings: No erythema or rash.   Neurological:      Mental Status: He is alert.      Cranial Nerves: No cranial nerve deficit.      Motor: No abnormal muscle tone.      Coordination: Coordination normal.      Deep Tendon Reflexes: Reflexes are normal and symmetric. Reflexes normal.      Comments: Oriented to person only, waxing and waning mental status. Able to follow simple commands and speaks few words. Limited neuro status due to unable to lack of patient cooperation.            Significant Labs: All pertinent labs within the past 24 hours have been reviewed.    Significant Imaging: I have reviewed all pertinent imaging results/findings within the past 24 hours.      Assessment/Plan:      * Decompensated hepatic cirrhosis  Alcoholic liver cirrhosis with ascites    Portal hypertension   Esophageal varices w/o bleeding   Hepatic encephalopathy   Coagulopathy   Thrombocytopenia     -admitted to [facility] ICU on 6/02 for presumed septic shock and hepatic encephalopathy (ammonia 102)  -treated with pressors, broad spectrum antibiotics and lactulose with some improvement of clinical status      MELD-Na: 31 at 6/21/2023  4:24 AM  MELD: 25 at 6/21/2023  4:24 AM  Calculated from:  Serum Creatinine: 0.7 mg/dL (Using min of 1 mg/dL) at 6/21/2023  4:24 AM  Serum Sodium: 125 mmol/L at 6/21/2023  4:24 AM  Total Bilirubin: 12.9 mg/dL at 6/21/2023  4:24 AM  INR(ratio): 2.2 at 6/21/2023  4:24 AM  -no signs of active bleeding or overt sepsis   -did not have paracentesis at outside hospital due to elevated INR, but empirically started on rocephin for SBP PPX  - paracentesis on 6/16 no SBP   -continue lactulose and rifaximin for hepatic encephalopathy (ammonia improved to 40)  -continue diuretics lasix and aldactone for ascites   -will check echo given elevated BNP and possible pleural effusion on CXR   -PETH pending  -continue midodrine for borderline low BP   --continue protonix daily   -monitor MELD closely with daily labs   -consuledt hepatology   - on antibitoics as infection risk high and had episode hypothermia.   - due to mental status no transplant evaluation opened yet  - palliative team following.         Encephalopathy  See liver disease      Delirium  -waxing and waning mental status   -due to decomp liver cirrhosis and prolonged hospital stay   -delirium precautions   - EEG in progress  - neurology consulted  - treating for wernickes      Physical debility  -due to liver cirrhosis and prolonged hospital stay for 2 weeks   -PT evaluation and treat       Hypomagnesemia  -likely from diuretic use   -will replace with MgSO4      Hypokalemia  -due to diuretic use   -will replace with KCL IVPB X 2      Coagulopathy  -INR 2.1  -due to decomp liver cirrhosis   -no signs  of bleeding   -received FFP and vitamin K at outside hospital   -continue vitamin K dailyx 3 days and monitor INR       Thrombocytopenia  -platelet today 133  -due to portal hypertension and splenic sequestration   -monitor trend       Hepatic encephalopathy  -improved with lactulose and rifaximin   -ammonia improved from 102 to 40  -titrate lactulose to 3-4 BMs/day       Secondary esophageal varices without bleeding  -had initial episode of bleeding in 2020 treated with banding   -no further bleeding episode since then per wife   -follows with local GI. Dr. Gallegos   -last EGD in February 2023 showed no bleeding from varices, but had portal hypertensive gastropathy   -continue protonix daily       Portal hypertension  -see above under decomp cirrhosis       Alcoholic cirrhosis of liver with ascites  -see above   -cut down alcohol in 2020 when diagnosed with esophageal varices and cirrhosis   -last alcohol use 2-3 months ago per wife   -check PETH   - unable to have psychiatry assessment as mental status has yet to improve.   - concern for alcohol usage in setting of known liver disease.           VTE Risk Mitigation (From admission, onward)         Ordered     IP VTE LOW RISK PATIENT  Once         06/15/23 0148     Place sequential compression device  Until discontinued         06/15/23 0148                Discharge Planning   NICKOLAS: 6/25/2023     Code Status: DNR   Is the patient medically ready for discharge?: No    Reason for patient still in hospital (select all that apply): Patient trending condition  Discharge Plan A: Skilled Nursing Facility   Discharge Delays: (!) Procedure Scheduling (IR, OR, Labs, Echo, Cath, Echo, EEG)              Alexsandra Menendez MD  Department of Hospital Medicine   [facility] - Intensive Care ([place])

## 2023-06-21 NOTE — PROGRESS NOTES
[facility] - Int[facility] ([facility]  Adult Nutrition  Progress Note    SUMMARY       Recommendations    Continue Low Na, Pureed diet (texture per SLP) + Boost Plus ONS - fluid restriction per MD. Encourage PO intake as tolerated.  RD to monitor & follow-up.    Goals: Meet % EEN, EPN by RD f/u date  Nutrition Goal Status: goal not met  Communication of RD Recs: other (comment) (POC)    Assessment and Plan    Moderate malnutrition    Nutrition Problem:  Moderate Protein-Calorie Malnutrition  Malnutrition in the context of Social/Environmental Circumstances    Related to (etiology):  Inability to consume sufficient energy     Signs and Symptoms (as evidenced by):  Energy Intake: <75% of estimated energy requirement for 1 month   Weight Loss: 5% x 1 month     Interventions(treatment strategy):  Collaboration of nutrition care w/ other providers  ONS    Nutrition Diagnosis Status:  New    Malnutrition Assessment    Malnutrition Context: social/environmental circumstances  Malnutrition Level: moderate    Weight Loss (Malnutrition): 5% in 1 month  Energy Intake (Malnutrition): less than 75% for greater than or equal to 1 month     Reason for Assessment    Reason For Assessment: RD follow-up  Diagnosis: liver disease  Relevant Medical History: Alcoholic cirrhosis of liver with ascites and portal HTN, HLD  Interdisciplinary Rounds: did not attend    General Information Comments: Diet per SLP. Disoriented; spoke w/ family member at bedside, who reports pt w/ poor appetite PTA/currently & UBW of 198#. Pt trying to drink at least 1 ONS/day. Pt meets criteria for moderate malnutrition. Please see PES statement for details. NFPE not appropriate 2/2 ongoing GOC (palliative care is following). Paracentesis complete 6/19.  Nutrition Discharge Planning: Adequate nutrition    Nutrition/Diet History    Spiritual, Cultural Beliefs, Pent[employer]alism Practices, Values that Affect Care: no  Factors Affecting Nutritional Intake:  "decreased appetite    Anthropometrics    Temp: 98 °F (36.7 °C)  Height: 5' 10" (177.8 cm)  Height (inches): 70 in  Weight Method: Bed Scale  Weight: 85.7 kg (188 lb 15 oz)  Weight (lb): 188.94 lb  Ideal Body Weight (IBW), Male: 166 lb  % Ideal Body Weight, Male (lb): 113.82 %  BMI (Calculated): 27.1  BMI Grade: 25 - 29.9 - overweight  Usual Body Weight (UBW), k kg  % Usual Body Weight: 95.42  % Weight Change From Usual Weight: -4.78 %    Lab/Procedures/Meds    Pertinent Labs Reviewed: reviewed  Pertinent Labs Comments: Na 125  Pertinent Medications Reviewed: reviewed  Pertinent Medications Comments: Lactulose    Estimated/Assessed Needs    Weight Used For Calorie Calculations: 85.7 kg (188 lb 15 oz)    Energy Calorie Requirements (kcal): 2020 kcal/d  Energy Need Method: [employer] (1.2 PAL)    Protein Requirements: 103 g/d (1.2 g/kg)  Weight Used For Protein Calculations: 85.7 kg (188 lb 15 oz)    Estimated Fluid Requirement Method:  (Per MD or 1 mL/kcal)  RDA Method (mL):     Nutrition Prescription Ordered    Current Diet Order: Low Na, Pureed  Nutrition Order Comments: 1500 mL FR  Oral Nutrition Supplement: Boost Plus    Evaluation of Received Nutrient/Fluid Intake    I/O: -9L since admit    Comments: LBM:     Tolerance: tolerating    Nutrition Risk    Level of Risk/Frequency of Follow-up:  (1x/week)     Monitor and Evaluation    Food and Nutrient Intake: food and beverage intake, energy intake  Food and Nutrient Adminstration: diet order  Knowledge/Beliefs/Attitudes: food and nutrition knowledge/skill, beliefs and attitudes  Physical Activity and Function: nutrition-related ADLs and IADLs  Anthropometric Measurements: height/length, weight, weight change, body mass index  Biochemical Data, Medical Tests and Procedures: gastrointestinal profile, electrolyte and renal panel, glucose/endocrine profile, inflammatory profile, lipid profile  Nutrition-Focused Physical Findings: overall appearance "     Nutrition Follow-Up    RD Follow-up?: Yes

## 2023-06-21 NOTE — CONSULTS
Patient seen and examined yesterday. Please refer to consult note on 6/20/23 for details.    Bhavesh Quesada, DO  Neurology PGY-1

## 2023-06-21 NOTE — PT/OT/SLP PROGRESS
Physical Therapy Treatment    Patient Name:  Cornelio Rivers   MRN:  [id number]    Recommendations:     Discharge Recommendations: nursing facility, skilled  Discharge Equipment Recommendations: hospital bed, lift device, wheelchair  Barriers to discharge: Decreased caregiver support Pt requiring increased skilled assistance at current time.     Assessment:     Cornelio Rivers is a 58 y.o. male admitted with a medical diagnosis of Decompensated hepatic cirrhosis.  He presents with the following impairments/functional limitations: weakness, impaired endurance, impaired self care skills, impaired functional mobility, gait instability, impaired balance, impaired cognition, decreased coordination, decreased upper extremity function, decreased safety awareness, pain, impaired coordination requiring significant assistance and verbal cues for bed mob, scooting to EOB/HOB, static sitting at the EOB to prevent falls due to weakness, lethargy, pain.   In light of pt's current functional level and deficits, it is anticipated that pt will need to participate in an intense rehab program consisting of PT and OT in order to achieve full rehab potential to return to previous level of function and roles.  Pt will cont to benefit from skilled PT intervention to address deficits and improve functional mobility. .    Rehab Prognosis: Fair and Poor; patient would benefit from acute skilled PT services to address these deficits and reach maximum level of function.    Recent Surgery: * No surgery found *      Plan:     During this hospitalization, patient to be seen 3 x/week to address the identified rehab impairments via gait training, therapeutic activities, therapeutic exercises, neuromuscular re-education and progress toward the following goals:    Plan of Care Expires:  07/15/23    Subjective     Chief Complaint: pain  Pain/Comfort:  Pain Rating 1:  (Pt unable to rate)  Location - Orientation 1: generalized  Location 1: leg (during ex's)  Pain  Addressed 1: Cessation of Activity  Pain Rating Post-Intervention 1:  (pt unable to rate. pt presents with facial grimacing)      Objective:     Communicated with nurse (Ruth) prior to session.  Patient found HOB elevated at 40* angle with Condom Catheter, oxygen, peripheral IV, telemetry, EEG (waffle pad) upon PT entry to room.     General Precautions: Standard, aspiration, fall, pureed diet  Orthopedic Precautions: N/A  Braces: N/A  Respiratory Status: Nasal cannula, flow 2 L/min     Functional Mobility:  NP 2* pt presents lethargic and unarousable   Pt performs B LE PROM ther ex's while sup in bed x15 reps with vc's  Pt performs B UE PROM ther ex's while sup in bed x15 reps with vc's      AM-PAC 6 CLICK MOBILITY  Turning over in bed (including adjusting bedclothes, sheets and blankets)?: 1  Sitting down on and standing up from a chair with arms (e.g., wheelchair, bedside commode, etc.): 1  Moving from lying on back to sitting on the side of the bed?: 1  Moving to and from a bed to a chair (including a wheelchair)?: 1  Need to walk in hospital room?: 1  Climbing 3-5 steps with a railing?: 1  Basic Mobility Total Score: 6       Treatment & Education:  Patient provided with daily orientation and goals of this PT session. They were educated to call for assistance and to transfer with hospital staff only.  Also, pt was educated on the effects of prolonged immobility and the importance of performing OOB activity and exercises to promote healing and reduce recovery time    Patient left HOB elevated with all lines intact, call button in reach, and nurse notified..    GOALS:   Multidisciplinary Problems       Physical Therapy Goals          Problem: Physical Therapy    Goal Priority Disciplines Outcome Goal Variances Interventions   Physical Therapy Goal     PT, PT/OT Ongoing, Progressing     Description: Goals to be met by: 2023     Patient will increase functional independence with mobility by performin.  Supine to sit with moderate assistance  2. Sit to supine with moderate assistance  3. Sit to stand transfer with maximal assistance  4. Bed to chair transfer with maximal assistance using LRAD as needed  5. Gait  x 10 feet with maximal assistance using LRAD as needed  6. Lower extremity exercise program x10 reps per handout, with supervision                        Time Tracking:     PT Received On: 06/21/23  PT Start Time: 1424     PT Stop Time: 1443  PT Total Time (min): 19 min     Billable Minutes: Therapeutic Exercise 19    Treatment Type: Treatment  PT/PTA: PTA     Number of PTA visits since last PT visit: 2     06/21/2023

## 2023-06-21 NOTE — ASSESSMENT & PLAN NOTE
-see above   -cut down alcohol in 2020 when diagnosed with esophageal varices and cirrhosis   -last alcohol use 2-3 months ago per wife   -check PETH   - unable to have psychiatry assessment as mental status has yet to improve.   - concern for alcohol usage in setting of known liver disease.

## 2023-06-21 NOTE — PLAN OF CARE
06/21/23 1309   Post-Acute Status   Post-Acute Authorization Placement   Post-Acute Placement Status Discharge Plan Changed   Discharge Delays (!) Procedure Scheduling (IR, OR, Labs, Echo, Cath, Echo, EEG)     CM spoke with Janay Robertsd from [facility] #[phone] and willing to accept pending authorization. CM informed Janay that patient is not medically ready at this time and will provide updates.  GOC completed and patient is a DNR.    Dilia Aguirre RN  Case Management  [facility]  [phone]

## 2023-06-21 NOTE — PROGRESS NOTES
[facility] ([place] [address])  Neurology  Progress Note    Patient Name: Cornelio Rivers  MRN: [id number]  Admission Date: 6/15/2023  Hospital Length of Stay: 6 days  Code Status: DNR   Attending Provider: Alexsandra Menendez MD  Primary Care Physician: Primary Doctor No   Principal Problem:Decompensated hepatic cirrhosis    HPI:   Mr. Rivers is a 58 year-old male with a history of alcoholic cirrhosis complicated by portal hypertension, esophageal varices and variceal bleed, ascites, coagulopathy and thrombocytopenia, hepatic encephalopathy and spontaneous bacterial peritonitis. His wife is present at bedside and provides the history. He originally presented to [facility] on 6/2 for altered mental status and hypotension. His cognitive baseline is alert and oriented to person, place, time, and situation. He is fully functional at baseline. He takes lactulose at home, however for several days prior to presenting to hospital, he was not having bowel movements, and became progressively more confused. Mr. Rivers was admitted for decompensated cirrhosis with hepatic encephalopathy. Due to hypotension, he was admitted to the MICU, and started on vasopressors and broad-spectrum antibiotics due to presumed septic shock. Was-stepped down from MICU on 6/8. Per his wife, during previous episodes of hepatic encephalopathy, he was at worst only disoriented to time: maintained orientation to person, place, and situation. On admission to [facility], she states that he was oriented only to person and place. This gradually deteriorated to being disoriented to person and place as well, and over the last week she states that he has become progressively more drowsy. Was [place] [facility] [place] for hepatology evaluation. On arrival, was reportedly oriented to person and following simple commands. However, over the last several days, drowsiness has worsened, and he is now not following commands. Ammonia at  "OSH was 120, repeat on 6/19 of 40. Currently on lactulose and rifaximin, as well as vancomycin and Zosyn, and iv thiamine. His wife denies that he has a personal or family history of seizures. Denies personal history of cancer. Denies prior drug use. States that he has been sober for approximately 3 months, but prior to this drank 1/5th of vodka or other hard liquor daily. She does state that in 2015, he had several transient episodes of left-sided numbness and altered temperature sensation (described as "coldness"): he presented to [facility], and his wife states that he was told that he had a stroke, however on outpatient follow-up in [facility]/repeat MRI, they were told that this was not the case. He does have a history of concussion secondary to trauma (falling off a horse in his 20-30s)--wife states that he has fallen at home but denies known head-strike. No known congential anatomic abnormalities of the CNS. Neurology consulted for rule out of Wernicke's encephalopathy vs seizure.      Interval history: Patient is more alert this morning, though still unable to follow commands. Drowsy this afternoon. Mentation waxes and wanes. MAPs have remained >65 on current midodrine regimen, and he remains afebrile. Serum sodium continuing to trend down to 125 this morning. Was 140 6 days ago. Reviewed outpatient labs with his wife, and it appears that this has been trending down for approximately the last year: 136 in 2021, 138 in 10/2022, and 130 on 1/2023. Cultures are still without growth. B12 normal. In regards to hepatitis work-up, KAILEY, AMA, ceruloplasmin all negative. B12 >2000. He is receiving lactulose q6h with at most one missed dose per day, however appears to be having infrequent bowel movements. EEG is consistent with moderate to severe encephalopathy.    Review of Systems   Unable to perform ROS: Mental status change   Objective:     Vital Signs (Most Recent):  Temp: 98 °F (36.7 °C) " "(06/21/23 0712)  Pulse: 98 (06/21/23 0712)  Resp: 20 (06/21/23 0712)  BP: 99/60 (06/21/23 0712)  SpO2: 97 % (06/21/23 0712) Vital Signs (24h Range):  Temp:  [97.6 °F (36.4 °C)-98.4 °F (36.9 °C)] 98 °F (36.7 °C)  Pulse:  [] 98  Resp:  [19-20] 20  SpO2:  [97 %-100 %] 97 %  BP: ()/(54-62) 99/60     Weight: 85.7 kg (188 lb 15 oz)  Body mass index is 27.11 kg/m².     Physical Exam  Neurological Exam:  MENTAL STATUS  Level of consciousness: drowsy, opens eyes spontaneously but not to command; does not follow commands  Orientation: disoriented to person, place, time, situation  Withdraws to pain in all four extremities, verbalizes "no, stop" to painful stimuli   No gaze deviation  No posturing observed    CRANIAL NERVES  CN III: PERRL  ->blinks to threat  ->no gaze deviation noted  ->no nystagmus noted  CN VII: facial expression symmetric and full  CN IX, X: symmetric palate elevation; phonation normal  CN XI: shoulder shrug and head turn intact bilaterally  CN XII: tongue midline, no deviation upon protrusion, no atrophy    MOTOR EXAM  Muscle bulk: normal  Muscle tone: mild hypotonia  Intermittently moves all four extremities  No rigidity on passive movement, though patient will intermittently oppose passive manipulation  Asterixis present    Biceps, patellar, and achilles reflexes 2+ bilaterally, though delayed  Planter reflex: down-going bilaterally.  Lopez's sign negative bilaterally.    SENSORY EXAM  Withdraws to pain in all four extremities.    COORDINATION  Unable to assess due to acuity of condition.           Significant Labs: All pertinent lab results from the past 24 hours have been reviewed.    Significant Imaging: I have reviewed all pertinent imaging results/findings within the past 24 hours.    Assessment and Plan:     Encephalopathy  Mr. Rivers is a 58 year-old male with a history of alcoholic cirrhosis complicated by hepatic encephalopathy. Presented to OSH due to encephalopathy and " hypotension. Per his wife, he was taking lactulose at home, however was not having adequate bowel movements (only one, every other day). She states that with prior episodes of encephalopathy, he was disoriented to place only. This episode appears to be worse. On presentation to OSH, he was oriented to person and place, but not to time and situation. This has worsened progressively to now being A&Ox0. He has also become progressively more drowsy. Was following commands upon transfer [place] [facility] [place], however he is no longer doing so. Currently being treated for hepatic encephalopathy with lactulose and rifaximin. He is A&Ox0 and doesn't follow commands, moves all four extremities, upper motor neuron signs are absent, displays asterixis, and there is no gaze deviation on exam. Neurology consulted for rule-out of Wernicke's encephalopathy vs seizure.     EEG demonstrates moderate to severe encephalopathy, and patient does not display posturing or gaze deviation on exam: thus doubt seizure. He does not display upper motor neuron signs on exam (Babinski's is down-going, Lopez's sign is absent, no hyperreflexia). Furthermore, does not display nuchal rigidity, cranial nerve deficits, or fever, and there is no leukocytosis on labs. For these reasons, doubt utility of further CNS imaging such as MRI brain, and doubt utility of LP at this time. Asterixis persists. There are currently no signs of neurologic dysfunction in excess of severe hepatic encephalopathy.     Recommendations  -can discontinue EEG  -agree with treatment of presumed Wernicke's with IV thiamine and obtaining thiamine level  -continue to optimize treatment of hepatic encephalopathy  -continue treatment/correction of other metabolic abnormalities  -can hold off on MRI brain at this time due to lack of focal neurologic deficit and lack of UMN signs on exam  -will hold off on LP at this time due to lack of convincing signs of CNS infection on labs and  exam    Neurology will sign off at this time. Please re-consult us if clinical picture changes.        VTE Risk Mitigation (From admission, onward)         Ordered     IP VTE LOW RISK PATIENT  Once         06/15/23 0148     Place sequential compression device  Until discontinued         06/15/23 0148                Bhavesh Quesada DO  Neurology  [facility] ([place]

## 2023-06-21 NOTE — NURSING
Patient is alert to self intermittently, 3L nasal cannula (SPO2 93-97%), NSR on Airstrips, and assist x 1/2.  NICKOLAS 6/25/23.  EEG in progress.       Latest Reference Range & Units 06/21/23 11:48   Vancomycin-Trough 10.0 - 22.0 ug/mL 22.6 (H)   (H): Data is abnormally high  Vancomycin 1g IVPB approved by Pharmacy to administer.     Zosyn 4.5 g IVPB given. Thiamine 500 mg IVPB given x 2. Lasix 40 mg & Spironolactone discontinued.  Patient is able to swallow pills whole, but administration was difficult earlier in the day. External Male Catheter in place.  ml.  Lactulose 45 ml q6h.  Last BM 6/19/23 (rectum) .      Spouse has remained at bedside and continues to provide supportive, loving, and attentive care toward Patient.

## 2023-06-21 NOTE — SUBJECTIVE & OBJECTIVE
Interval History:   6/21: EEG in progress    Review of Systems   Reason unable to perform ROS: lethargy and confusion.   Objective:     Vital Signs (Most Recent):  Temp: 98 °F (36.7 °C) (06/21/23 1154)  Pulse: 82 (06/21/23 1436)  Resp: 20 (06/21/23 1154)  BP: 102/64 (06/21/23 1154)  SpO2: 97 % (06/21/23 1154) Vital Signs (24h Range):  Temp:  [98 °F (36.7 °C)-98.4 °F (36.9 °C)] 98 °F (36.7 °C)  Pulse:  [] 82  Resp:  [20] 20  SpO2:  [97 %-98 %] 97 %  BP: ()/(54-64) 102/64     Weight: 85.7 kg (188 lb 15 oz)  Body mass index is 27.11 kg/m².    Intake/Output Summary (Last 24 hours) at 6/21/2023 1536  Last data filed at 6/21/2023 1045  Gross per 24 hour   Intake 60 ml   Output 1200 ml   Net -1140 ml         Physical Exam  Constitutional:       General: He is not in acute distress.     Appearance: He is well-developed. He is ill-appearing. He is not diaphoretic.   HENT:      Head: Normocephalic and atraumatic.      Nose: Nose normal.      Mouth/Throat:      Pharynx: No oropharyngeal exudate.   Eyes:      General: No scleral icterus.     Conjunctiva/sclera: Conjunctivae normal.      Pupils: Pupils are equal, round, and reactive to light.   Neck:      Thyroid: No thyromegaly.      Vascular: No JVD.      Trachea: No tracheal deviation.   Cardiovascular:      Rate and Rhythm: Normal rate and regular rhythm.      Heart sounds: Normal heart sounds. No murmur heard.  Pulmonary:      Effort: Pulmonary effort is normal. No respiratory distress.      Breath sounds: Rhonchi present. No wheezing or rales.   Chest:      Chest wall: No tenderness.   Abdominal:      General: Bowel sounds are normal. There is distension (moderate distention with ascites).      Palpations: Abdomen is soft. There is no mass.      Tenderness: There is abdominal tenderness (mild diffuse TTP w/o guarding). There is no guarding or rebound.      Hernia: A hernia (reducilble umbilical hernia) is present.   Musculoskeletal:         General: No  tenderness.      Cervical back: Normal range of motion and neck supple.      Right lower leg: Edema present.      Left lower leg: Edema present.   Lymphadenopathy:      Cervical: No cervical adenopathy.   Skin:     General: Skin is warm and dry.      Coloration: Skin is jaundiced.      Findings: No erythema or rash.   Neurological:      Mental Status: He is alert.      Cranial Nerves: No cranial nerve deficit.      Motor: No abnormal muscle tone.      Coordination: Coordination normal.      Deep Tendon Reflexes: Reflexes are normal and symmetric. Reflexes normal.      Comments: Oriented to person only, waxing and waning mental status. Able to follow simple commands and speaks few words. Limited neuro status due to unable to lack of patient cooperation.            Significant Labs: All pertinent labs within the past 24 hours have been reviewed.    Significant Imaging: I have reviewed all pertinent imaging results/findings within the past 24 hours.

## 2023-06-21 NOTE — SUBJECTIVE & OBJECTIVE
"Interval history: Patient is more alert this morning, though still unable to follow commands. Drowsy this afternoon. Mentation waxes and wanes. MAPs have remained >65 on current midodrine regimen, and he remains afebrile. Serum sodium continuing to trend down to 125 this morning. Was 140 6 days ago. Reviewed outpatient labs with his wife, and it appears that this has been trending down for approximately the last year: 136 in 2021, 138 in 10/2022, and 130 on 1/2023. Cultures are still without growth. B12 normal. In regards to hepatitis work-up, KAILEY, AMA, ceruloplasmin all negative. B12 >2000. He is receiving lactulose q6h with at most one missed dose per day, however appears to be having infrequent bowel movements. EEG is consistent with moderate to severe encephalopathy.    Review of Systems   Unable to perform ROS: Mental status change   Objective:     Vital Signs (Most Recent):  Temp: 98 °F (36.7 °C) (06/21/23 0712)  Pulse: 98 (06/21/23 0712)  Resp: 20 (06/21/23 0712)  BP: 99/60 (06/21/23 0712)  SpO2: 97 % (06/21/23 0712) Vital Signs (24h Range):  Temp:  [97.6 °F (36.4 °C)-98.4 °F (36.9 °C)] 98 °F (36.7 °C)  Pulse:  [] 98  Resp:  [19-20] 20  SpO2:  [97 %-100 %] 97 %  BP: ()/(54-62) 99/60     Weight: 85.7 kg (188 lb 15 oz)  Body mass index is 27.11 kg/m².     Physical Exam   Neurological Exam:  MENTAL STATUS  Level of consciousness: drowsy, opens eyes spontaneously but not to command; does not follow commands  Orientation: disoriented to person, place, time, situation  Withdraws to pain in all four extremities, verbalizes "no, stop" to painful stimuli   No gaze deviation  No posturing observed    CRANIAL NERVES  CN III: PERRL  ->blinks to threat  ->unable to assess EOM due to inability to follow commands, but no gaze deviation noted  ->no nystagmus noted  CN VII: facial expression symmetric and full  CN IX, X: symmetric palate elevation; phonation normal  CN XI: shoulder shrug and head turn intact " bilaterally  CN XII: tongue midline, no deviation upon protrusion, no atrophy    MOTOR EXAM  Muscle bulk: normal  Muscle tone: normal  Intermittently moves all four extremities  No rigidity on passive movement, though patient will intermittently oppose passive manipulation  Asterixis present    Biceps, patellar, and achilles reflexes 2+ bilaterally, though delayed  Planter reflex: down-going bilaterally.  Lopez's sign negative bilaterally.    SENSORY EXAM  Withdraws to pain in all four extremities.    COORDINATION  Unable to assess due to acuity of condition.           Significant Labs: All pertinent lab results from the past 24 hours have been reviewed.    Significant Imaging: I have reviewed all pertinent imaging results/findings within the past 24 hours.

## 2023-06-21 NOTE — ASSESSMENT & PLAN NOTE
Nutrition Problem:  Moderate Protein-Calorie Malnutrition  Malnutrition in the context of Social/Environmental Circumstances    Related to (etiology):  Inability to consume sufficient energy     Signs and Symptoms (as evidenced by):  Energy Intake: <75% of estimated energy requirement for 1 month   Weight Loss: 5% x 1 month     Interventions(treatment strategy):  Collaboration of nutrition care w/ other providers  [facility]    Nutrition Diagnosis Status:  New

## 2023-06-21 NOTE — NURSING
Latest Reference Range & Units 06/21/23 11:48   Vancomycin-Trough 10.0 - 22.0 ug/mL 22.6 (H)   (H): Data is abnormally high    Per B. Emani, PharmD, Discontinued Vancomycin 1.25g, restarted Vancomycin 1 g, held x 4 hours.  Approved to give next dose at 1600 hours.

## 2023-06-21 NOTE — PLAN OF CARE
Problem: Adult Inpatient Plan of Care  Goal: Plan of Care Review  Outcome: Ongoing, Progressing     Problem: Fall Injury Risk  Goal: Absence of Fall and Fall-Related Injury  Outcome: Ongoing, Progressing     Problem: Gas Exchange Impaired  Goal: Optimal Gas Exchange  Outcome: Ongoing, Progressing     Problem: Impaired Wound Healing  Goal: Optimal Wound Healing  Outcome: Ongoing, Progressing

## 2023-06-21 NOTE — HOSPITAL COURSE
Patient is more alert this morning, though still unable to follow commands. MAPs have remained >65 on current midodrine regimen, and he remains afebrile. Serum sodium continuing to trend down to 125 this morning. Reviewed outpatient labs with his wife, and it appears that this has been trending down for approximately the last year: 136 in 2021, 138 in 10/2022, and 130 on 1/2023. Cultures are still without growth. B12 normal. In regards to hepatitis work-up, KAILEY, AMA, ceruloplasmin all negative. B12 >2000. He is receiving lactulose q6h with at most one missed dose per day, however appears to be having infrequent bowel movements. EEG demonstrating moderate-severe encephalopathy.

## 2023-06-21 NOTE — ASSESSMENT & PLAN NOTE
Mr. Rivers is a 58 year-old male with a history of alcoholic cirrhosis complicated by hepatic encephalopathy. Presented to OSH due to encephalopathy and hypotension. Per his wife, he was taking lactulose at home, however was not having adequate bowel movements (only one, every other day). She states that with prior episodes of encephalopathy, he was disoriented to place only. This episode appears to be worse. On presentation to OSH, he was oriented to person and place, but not to time and situation. This has worsened progressively to now being A&Ox0. He has also become progressively more drowsy. Was following commands [place] [facility], however he is no longer doing so. Currently being treated for hepatic encephalopathy with lactulose and rifaximin. He is A&Ox0 and doesn't follow commands, moves all four extremities, upper motor neuron signs are absent, displays asterixis, and there is no gaze deviation on exam. Neurology consulted for rule-out of Wernicke's encephalopathy vs seizure.     EEG demonstrates moderate to severe encephalopathy, and patient does not display posturing or gaze deviation on exam: thus doubt seizure. He does not display upper motor neuron signs on exam (Babinski's is down-going, Lopez's sign is absent, no hyperreflexia). Furthermore, does not display nuchal rigidity, cranial nerve deficits, or fever, and there is no leukocytosis on labs. For these reasons, doubt utility of further CNS imaging such as MRI brain, and doubt utility of LP. There are currently no signs of neurologic dysfunction in excess of severe hepatic encephalopathy.     Recommendations  -can discontinue EEG  -agree with treatment of presumed Wernicke's with IV thiamine and obtaining thiamine level  -continue to optimize treatment of hepatic encephalopathy  -continue treatment/correction of other metabolic abnormalities  -can hold off on MRI brain at this time due to lack of focal neurologic deficit on  exam  -would consider LP if clinical condition continues to deteriorate, and if there are signs of CNS infection    Neurology will follow peripherally. Please contact us with any questions.

## 2023-06-21 NOTE — SUBJECTIVE & OBJECTIVE
Interval History: No significant new changes. Wife thinks he is a bit more awake but still quite encephalopathic.   Neuro recommendations pending completeness of EEG    Past Medical History:   Diagnosis Date    Alcoholic cirrhosis of liver with ascites     Ascites     Coagulopathy     Esophageal varices with bleeding     Hepatic encephalopathy     Mixed hyperlipidemia     Portal hypertension     Thrombocytopenia, unspecified        Past Surgical History:   Procedure Laterality Date    BICEPS TENDON REPAIR Right     femur facture Right     HERNIA REPAIR Bilateral        Review of patient's allergies indicates:  No Known Allergies    Medications:  Continuous Infusions:  Scheduled Meds:   lactulose  30 g Oral Q6H    midodrine  15 mg Oral TID WM    mupirocin   Nasal BID    pantoprazole  40 mg Oral Daily    piperacillin-tazobactam (Zosyn) IV (PEDS and ADULTS) (extended infusion is not appropriate)  4.5 g Intravenous Q8H    rifAXImin  550 mg Oral BID    thiamine (VITAMIN B1) IVPB  500 mg Intravenous TID    vancomycin (VANCOCIN) IVPB  1,000 mg Intravenous Q12H     PRN Meds:acetaminophen, albuterol-ipratropium, dextrose 10%, dextrose 10%, dextrose, dextrose, glucagon (human recombinant), melatonin, naloxone, ondansetron, sodium chloride 0.9%, Pharmacy to dose Vancomycin consult **AND** vancomycin - pharmacy to dose    Family History       Problem Relation (Age of Onset)    Heart disease Father    Hypertension Mother          Tobacco Use    Smoking status: Former     Types: Cigarettes    Smokeless tobacco: Not on file   Substance and Sexual Activity    Alcohol use: Not Currently    Drug use: Never    Sexual activity: Yes       Review of Systems   Unable to perform ROS: Mental status change   Objective:     Vital Signs (Most Recent):  Temp: 98 °F (36.7 °C) (06/21/23 1154)  Pulse: 82 (06/21/23 1436)  Resp: 20 (06/21/23 1154)  BP: 102/64 (06/21/23 1154)  SpO2: 97 % (06/21/23 1154) Vital Signs (24h Range):  Temp:  [97.6 °F (36.4  °C)-98.4 °F (36.9 °C)] 98 °F (36.7 °C)  Pulse:  [] 82  Resp:  [20] 20  SpO2:  [97 %-100 %] 97 %  BP: ()/(54-64) 102/64     Weight: 85.7 kg (188 lb 15 oz)  Body mass index is 27.11 kg/m².       Physical Exam  Constitutional:       Comments: Somnlent, briefly arrousable. Does not track. Chronically ill appearing.    HENT:      Mouth/Throat:      Mouth: Mucous membranes are moist.      Pharynx: No oropharyngeal exudate.   Eyes:      General: Scleral icterus present.   Cardiovascular:      Rate and Rhythm: Normal rate.      Heart sounds: No murmur heard.  Pulmonary:      Effort: No respiratory distress.      Breath sounds: No wheezing.   Abdominal:      General: There is distension.      Tenderness: There is no abdominal tenderness. There is no guarding.   Musculoskeletal:         General: No swelling or deformity.      Cervical back: No rigidity or tenderness.   Skin:     General: Skin is warm.      Coloration: Skin is jaundiced.   Neurological:      General: No focal deficit present.      Motor: Weakness present.      Comments: Asterixis present. Can not hold head up, opens eyes briefly but drifts to sleep. Can not verbalize.           Review of Symptoms      Symptom Assessment (ESAS 0-10 Scale)  Pain:  0  Dyspnea:  0  Anxiety:  0  Nausea:  0  Depression:  0  Anorexia:  0  Fatigue:  0  Insomnia:  0  Restlessness:  0  Agitation:  0 due to Mental status change         Pain Assessment in Advanced Demential Scale (PAINAD)   Breathing - Independent of vocalization:  0  Negative vocalization:  0  Facial expression:  0  Body language:  0  Consolability:  0  Total:  0    Living Arrangements:  Lives with spouse    Psychosocial/Cultural:   See Palliative Psychosocial Note: No  Social Issues Identified: Coping deficit pt/family  Bereavement Risk: No  Caregiver Needs Discussed. Caregiver Distress: Yes: Intensity of family caregiving  Cultural: none  **Primary  to Follow**  Palliative Care   Consult: No    Spiritual:  F - Ramya and Belief:  Yes  I - Importance:  Yes  C - Community:  Yes  A - Address in Care:  Yes     Time-Based Charting:  No      Advance Care Planning   Advance Directives:   Living Will: No    LaPOST: No    Do Not Resuscitate Status: No    Medical Power of : No      Decision Making:  Family answered questions  Goals of Care: What is most important right now is to focus on comfort and QOL . Accordingly, we have decided that the best plan to meet the patient's goals includes continuing with treatment.       Significant Labs: CBC:   Recent Labs   Lab 06/20/23 0521   WBC 9.14   HGB 7.4*   HCT 22.1*   *       CMP:   Recent Labs   Lab 06/20/23 0521 06/21/23  0424   * 125*   K 3.6 3.4*   CL 87* 84*   CO2 30* 32*   * 104   BUN 8 7   CREATININE 0.7 0.7   CALCIUM 8.7 8.3*   PROT 5.6* 5.4*   ALBUMIN 2.6* 2.4*   BILITOT 13.3* 12.9*   ALKPHOS 150* 153*   AST 88* 87*   ALT 63* 59*   ANIONGAP 10 9       Coagulation:   Recent Labs   Lab 06/21/23 0424   INR 2.2*       CBC:   Recent Labs   Lab 06/20/23 0521   WBC 9.14   HGB 7.4*   HCT 22.1*   *   *       BMP:  Recent Labs   Lab 06/21/23 0424      *   K 3.4*   CL 84*   CO2 32*   BUN 7   CREATININE 0.7   CALCIUM 8.3*       LFT:  Lab Results   Component Value Date    AST 87 (H) 06/21/2023    ALKPHOS 153 (H) 06/21/2023    BILITOT 12.9 (H) 06/21/2023     Albumin:   Albumin   Date Value Ref Range Status   06/21/2023 2.4 (L) 3.5 - 5.2 g/dL Final     Protein:   Total Protein   Date Value Ref Range Status   06/21/2023 5.4 (L) 6.0 - 8.4 g/dL Final     Lactic acid:   No results found for: LACTATE    Significant Imaging: CXR: I have reviewed all pertinent results/findings within the past 24 hours:

## 2023-06-21 NOTE — ASSESSMENT & PLAN NOTE
Alcoholic liver cirrhosis with ascites   Portal hypertension   Esophageal varices w/o bleeding   Hepatic encephalopathy   Coagulopathy   Thrombocytopenia     -admitted to [facility] ICU on 6/02 for presumed septic shock and hepatic encephalopathy (ammonia 102)  -treated with pressors, broad spectrum antibiotics and lactulose with some improvement of clinical status      MELD-Na: 31 at 6/21/2023  4:24 AM  MELD: 25 at 6/21/2023  4:24 AM  Calculated from:  Serum Creatinine: 0.7 mg/dL (Using min of 1 mg/dL) at 6/21/2023  4:24 AM  Serum Sodium: 125 mmol/L at 6/21/2023  4:24 AM  Total Bilirubin: 12.9 mg/dL at 6/21/2023  4:24 AM  INR(ratio): 2.2 at 6/21/2023  4:24 AM  -no signs of active bleeding or overt sepsis   -did not have paracentesis at outside hospital due to elevated INR, but empirically started on rocephin for SBP PPX  - paracentesis on 6/16 no SBP   -continue lactulose and rifaximin for hepatic encephalopathy (ammonia improved to 40)  -continue diuretics lasix and aldactone for ascites   -will check echo given elevated BNP and possible pleural effusion on CXR   -PETH pending  -continue midodrine for borderline low BP   --continue protonix daily   -monitor MELD closely with daily labs   -consuledt hepatology   - on antibitoics as infection risk high and had episode hypothermia.   - due to mental status no transplant evaluation opened yet  - palliative team following.

## 2023-06-21 NOTE — ASSESSMENT & PLAN NOTE
-waxing and waning mental status   -due to decomp liver cirrhosis and prolonged hospital stay   -delirium precautions   - EEG in progress  - neurology consulted  - treating for wernickes

## 2023-06-21 NOTE — PROGRESS NOTES
Pharmacokinetic Assessment Follow Up: IV Vancomycin    Vancomycin serum concentration assessment(s):    Vancomycin level=22.6 mcg/mL, drawn appropriately    Vancomycin Regimen Plan:    Stop vanc 1.25g IV q12h. Will start 1g IV q12h and push out next dose 4 hours to clear to <20 mcg/mL.  SCr at baseline  Next level 6/23 at 0300    Drug levels (last 3 results):  Recent Labs   Lab Result Units 06/21/23  1148   Vancomycin-Trough ug/mL 22.6*       Pharmacy will continue to follow and monitor vancomycin.    Thank you for the consult,   Haether Joaquin, PharmD, [facility]  [phone]     Patient brief summary:  Cornelio Rivers is a 58 y.o. male initiated on antimicrobial therapy with IV Vancomycin for treatment of sepsis    Drug Allergies:   Review of patient's allergies indicates:  No Known Allergies    Actual Body Weight:   85kg    Renal Function:   Estimated Creatinine Clearance: 118.8 mL/min (based on SCr of 0.7 mg/dL).,     CBC (last 72 hours):  Recent Labs   Lab Result Units 06/19/23  1148 06/20/23  0521   WBC K/uL 10.16 9.14   Hemoglobin g/dL 8.3* 7.4*   Hematocrit % 24.7* 22.1*   Platelets K/uL 142* 107*   Gran % % 70.5 65.8   Lymph % % 13.1* 13.6*   Mono % % 12.1 12.5   Eosinophil % % 2.3 6.8   Basophil % % 0.6 0.8   Differential Method  Automated Automated       Metabolic Panel (last 72 hours):  Recent Labs   Lab Result Units 06/19/23  0806 06/19/23  1816 06/20/23  0521 06/21/23  0424   Sodium mmol/L 132*  --  127* 125*   Potassium mmol/L 4.3  --  3.6 3.4*   Chloride mmol/L 91*  --  87* 84*   CO2 mmol/L 26  --  30* 32*   Glucose mg/dL 131*  --  112* 104   Glucose, UA   --  Negative  Negative  Negative  --   --    BUN mg/dL 8  --  8 7   Creatinine mg/dL 0.5  --  0.7 0.7   Albumin g/dL 2.5*  --  2.6* 2.4*   Total Bilirubin mg/dL 14.3*  --  13.3* 12.9*   Alkaline Phosphatase U/L 185*  --  150* 153*   AST U/L 120*  --  88* 87*   ALT U/L 69*  --  63* 59*   Magnesium mg/dL 1.6  --   --   --        Vancomycin  Administrations:  vancomycin given in the last 96 hours                     vancomycin 1,250 mg in dextrose 5 % (D5W) 250 mL IVPB (Vial-Mate) (mg) 1,250 mg New Bag 06/21/23 0051     1,250 mg New Bag 06/20/23 1305    vancomycin 1,250 mg in dextrose 5 % (D5W) 250 mL IVPB (Vial-Mate) (mg) 1,250 mg New Bag 06/20/23 0031    vancomycin 2 g in dextrose 5 % 500 mL IVPB (mg) 2,000 mg New Bag 06/19/23 1709                    Microbiologic Results:  Microbiology Results (last 7 days)       Procedure Component Value Units Date/Time    Blood culture [[id number]] Collected: 06/19/23 0806    Order Status: Completed Specimen: Blood Updated: 06/21/23 1212     Blood Culture, Routine No Growth to date      No Growth to date      No Growth to date    Narrative:      Rt forearm    Blood culture [[id number]] Collected: 06/19/23 1638    Order Status: Completed Specimen: Blood Updated: 06/20/23 1812     Blood Culture, Routine No Growth to date      No Growth to date    Blood culture [[id number]] Collected: 06/19/23 1638    Order Status: Completed Specimen: Blood Updated: 06/20/23 1812     Blood Culture, Routine No Growth to date      No Growth to date    (rule out SBP) Aerobic culture [[id number]] Collected: 06/19/23 0920    Order Status: Completed Specimen: Ascites Updated: 06/20/23 0705     Aerobic Bacterial Culture No growth    Narrative:      To rule out SBP order labs: Aerobic culture [ZPX769],  Culture, Anaerobic [WCY863], Gram stain [LNI245], Albumin  [NGY768], Protein [MZN873], LDH [IER786], WBC \T\ Dff  [HPU5635].    (rule out SBP) Culture, Anaerobic [[id number]] Collected: 06/19/23 0920    Order Status: Completed Specimen: Ascites Updated: 06/20/23 0624     Anaerobic Culture Culture in progress    Narrative:      To rule out SBP order labs: Aerobic culture [PMN021],  Culture, Anaerobic [RTE955], Gram stain [QWR100], Albumin  [CWP326], Protein [BQG459], LDH [PCI399], WBC \T\ Dff  [WQB4587].    (rule out SBP) Gram stain [[id number]]  Collected: 06/19/23 0920    Order Status: Completed Specimen: Ascites Updated: 06/19/23 1811     Gram Stain Result Rare WBC's      No organisms seen    Narrative:      To rule out SBP order labs: Aerobic culture [WIV644],  Culture, Anaerobic [DBJ364], Gram stain [KSI465], Albumin  [LRE055], Protein [XCG262], LDH [FMU921], WBC \T\ Dff  [LFU9405].    (rule out SBP) Culture, Anaerobic [[id number]] Collected: 06/15/23 1213    Order Status: Completed Specimen: Ascites Updated: 06/19/23 1538     Anaerobic Culture No anaerobes isolated    Narrative:      To rule out SBP order labs: Aerobic culture [BKL069],  Culture, Anaerobic [HPT939], Gram stain [DMO041], Albumin  [ZKC645], Protein [SQX657], LDH [QYV147], WBC \T\ Dff  [FIZ6085].    (rule out SBP) Aerobic culture [[id number]] Collected: 06/15/23 1213    Order Status: Completed Specimen: Ascites Updated: 06/19/23 1008     Aerobic Bacterial Culture No growth    Narrative:      To rule out SBP order labs: Aerobic culture [SWN099],  Culture, Anaerobic [EUN247], Gram stain [PBM322], Albumin  [BCA791], Protein [DHV305], LDH [FCB742], WBC \T\ Dff  [EOZ7166].    (rule out SBP) Gram stain [[id number]] Collected: 06/15/23 1213    Order Status: Completed Specimen: Ascites Updated: 06/15/23 1802     Gram Stain Result No WBC's      No organisms seen    Narrative:      To rule out SBP order labs: Aerobic culture [PNY646],  Culture, Anaerobic [VTU637], Gram stain [WNJ654], Albumin  [CPF448], Protein [RRL927], LDH [QZM398], WBC \T\ Dff  [THT4787].

## 2023-06-21 NOTE — ASSESSMENT & PLAN NOTE
-INR 2.1  -due to decomp liver cirrhosis   -no signs of bleeding   -received FFP and vitamin K at outside hospital   -continue vitamin K dailyx 3 days and monitor INR

## 2023-06-21 NOTE — PLAN OF CARE
Recommendations     Continue Low Na, Pureed diet (texture per SLP) + Boost Plus ONS - fluid restriction per MD. Encourage PO intake as tolerated.  RD to monitor & follow-up.     Goals: Meet % EEN, EPN by RD f/u date  Nutrition Goal Status: goal not met  Communication of RD Recs: other (comment) (POC)

## 2023-06-22 PROBLEM — G93.41 ENCEPHALOPATHY, METABOLIC: Status: ACTIVE | Noted: 2023-06-15

## 2023-06-22 PROBLEM — E87.1 HYPONATREMIA: Status: ACTIVE | Noted: 2023-06-22

## 2023-06-22 PROBLEM — G93.40 ENCEPHALOPATHY: Status: RESOLVED | Noted: 2023-06-20 | Resolved: 2023-06-22

## 2023-06-22 LAB
ALBUMIN SERPL BCP-MCNC: 2.2 G/DL (ref 3.5–5.2)
ALP SERPL-CCNC: 140 U/L (ref 55–135)
ALT SERPL W/O P-5'-P-CCNC: 55 U/L (ref 10–44)
ANION GAP SERPL CALC-SCNC: 8 MMOL/L (ref 8–16)
AST SERPL-CCNC: 80 U/L (ref 10–40)
BACTERIA SPEC AEROBE CULT: NO GROWTH
BILIRUB SERPL-MCNC: 11.5 MG/DL (ref 0.1–1)
BUN SERPL-MCNC: 8 MG/DL (ref 6–20)
CALCIUM SERPL-MCNC: 8.4 MG/DL (ref 8.7–10.5)
CHLORIDE SERPL-SCNC: 85 MMOL/L (ref 95–110)
CO2 SERPL-SCNC: 32 MMOL/L (ref 23–29)
CREAT SERPL-MCNC: 0.7 MG/DL (ref 0.5–1.4)
EST. GFR  (NO RACE VARIABLE): >60 ML/MIN/1.73 M^2
GLUCOSE SERPL-MCNC: 129 MG/DL (ref 70–110)
INR PPP: 2.2 (ref 0.8–1.2)
POTASSIUM SERPL-SCNC: 3.5 MMOL/L (ref 3.5–5.1)
PROT SERPL-MCNC: 5 G/DL (ref 6–8.4)
PROTHROMBIN TIME: 22.3 SEC (ref 9–12.5)
SODIUM SERPL-SCNC: 125 MMOL/L (ref 136–145)
T4 FREE SERPL-MCNC: 0.93 NG/DL (ref 0.71–1.51)
TSH SERPL DL<=0.005 MIU/L-ACNC: 2.75 UIU/ML (ref 0.4–4)

## 2023-06-22 PROCEDURE — 97110 THERAPEUTIC EXERCISES: CPT | Mod: NTX,CQ

## 2023-06-22 PROCEDURE — 97530 THERAPEUTIC ACTIVITIES: CPT | Mod: NTX,CO

## 2023-06-22 PROCEDURE — 85610 PROTHROMBIN TIME: CPT | Mod: NTX | Performed by: HOSPITALIST

## 2023-06-22 PROCEDURE — C9113 INJ PANTOPRAZOLE SODIUM, VIA: HCPCS | Mod: NTX | Performed by: STUDENT IN AN ORGANIZED HEALTH CARE EDUCATION/TRAINING PROGRAM

## 2023-06-22 PROCEDURE — 25000003 PHARM REV CODE 250: Mod: NTX | Performed by: STUDENT IN AN ORGANIZED HEALTH CARE EDUCATION/TRAINING PROGRAM

## 2023-06-22 PROCEDURE — 97110 THERAPEUTIC EXERCISES: CPT | Mod: NTX,CO

## 2023-06-22 PROCEDURE — 99233 SBSQ HOSP IP/OBS HIGH 50: CPT | Mod: NTX,,, | Performed by: STUDENT IN AN ORGANIZED HEALTH CARE EDUCATION/TRAINING PROGRAM

## 2023-06-22 PROCEDURE — 99233 PR SUBSEQUENT HOSPITAL CARE,LEVL III: ICD-10-PCS | Mod: NTX,,, | Performed by: STUDENT IN AN ORGANIZED HEALTH CARE EDUCATION/TRAINING PROGRAM

## 2023-06-22 PROCEDURE — 36415 COLL VENOUS BLD VENIPUNCTURE: CPT | Mod: NTX | Performed by: HOSPITALIST

## 2023-06-22 PROCEDURE — 99232 PR SUBSEQUENT HOSPITAL CARE,LEVL II: ICD-10-PCS | Mod: NTX,,, | Performed by: INTERNAL MEDICINE

## 2023-06-22 PROCEDURE — 63600175 PHARM REV CODE 636 W HCPCS: Mod: JZ,JG,NTX | Performed by: STUDENT IN AN ORGANIZED HEALTH CARE EDUCATION/TRAINING PROGRAM

## 2023-06-22 PROCEDURE — P9047 ALBUMIN (HUMAN), 25%, 50ML: HCPCS | Mod: JZ,JG,NTX | Performed by: STUDENT IN AN ORGANIZED HEALTH CARE EDUCATION/TRAINING PROGRAM

## 2023-06-22 PROCEDURE — 99232 SBSQ HOSP IP/OBS MODERATE 35: CPT | Mod: NTX,,, | Performed by: INTERNAL MEDICINE

## 2023-06-22 PROCEDURE — 92526 ORAL FUNCTION THERAPY: CPT | Mod: NTX

## 2023-06-22 PROCEDURE — 63600175 PHARM REV CODE 636 W HCPCS: Mod: NTX | Performed by: STUDENT IN AN ORGANIZED HEALTH CARE EDUCATION/TRAINING PROGRAM

## 2023-06-22 PROCEDURE — 25000003 PHARM REV CODE 250: Mod: NTX | Performed by: HOSPITALIST

## 2023-06-22 PROCEDURE — 84443 ASSAY THYROID STIM HORMONE: CPT | Mod: NTX | Performed by: HOSPITALIST

## 2023-06-22 PROCEDURE — 84439 ASSAY OF FREE THYROXINE: CPT | Mod: NTX | Performed by: HOSPITALIST

## 2023-06-22 PROCEDURE — 95813 EEG EXTND MNTR 61-119 MIN: CPT | Mod: 26,NTX,, | Performed by: PSYCHIATRY & NEUROLOGY

## 2023-06-22 PROCEDURE — 97535 SELF CARE MNGMENT TRAINING: CPT | Mod: NTX

## 2023-06-22 PROCEDURE — 63600175 PHARM REV CODE 636 W HCPCS: Mod: NTX | Performed by: HOSPITALIST

## 2023-06-22 PROCEDURE — 80053 COMPREHEN METABOLIC PANEL: CPT | Mod: NTX | Performed by: HOSPITALIST

## 2023-06-22 PROCEDURE — 95813 PR EEG, EXTENDED, 61-119 MINS: ICD-10-PCS | Mod: 26,NTX,, | Performed by: PSYCHIATRY & NEUROLOGY

## 2023-06-22 PROCEDURE — 97530 THERAPEUTIC ACTIVITIES: CPT | Mod: NTX,CQ

## 2023-06-22 PROCEDURE — 20600001 HC STEP DOWN PRIVATE ROOM: Mod: NTX

## 2023-06-22 RX ORDER — ALBUMIN HUMAN 250 G/1000ML
25 SOLUTION INTRAVENOUS EVERY 12 HOURS
Status: COMPLETED | OUTPATIENT
Start: 2023-06-22 | End: 2023-06-22

## 2023-06-22 RX ORDER — PANTOPRAZOLE SODIUM 40 MG/10ML
40 INJECTION, POWDER, LYOPHILIZED, FOR SOLUTION INTRAVENOUS DAILY
Status: DISCONTINUED | OUTPATIENT
Start: 2023-06-22 | End: 2023-06-25

## 2023-06-22 RX ORDER — PANTOPRAZOLE SODIUM 40 MG/1
40 FOR SUSPENSION ORAL DAILY
Status: CANCELLED | OUTPATIENT
Start: 2023-06-22

## 2023-06-22 RX ORDER — POTASSIUM CHLORIDE 20 MEQ/1
40 TABLET, EXTENDED RELEASE ORAL ONCE
Status: CANCELLED | OUTPATIENT
Start: 2023-06-22 | End: 2023-06-22

## 2023-06-22 RX ORDER — CIPROFLOXACIN 250 MG/1
500 TABLET, FILM COATED ORAL EVERY 24 HOURS
Status: DISCONTINUED | OUTPATIENT
Start: 2023-06-24 | End: 2023-06-28 | Stop reason: HOSPADM

## 2023-06-22 RX ADMIN — PIPERACILLIN SODIUM AND TAZOBACTAM SODIUM 4.5 G: 4; .5 INJECTION, POWDER, LYOPHILIZED, FOR SOLUTION INTRAVENOUS at 11:06

## 2023-06-22 RX ADMIN — LACTULOSE 30 G: 10 SOLUTION ORAL at 05:06

## 2023-06-22 RX ADMIN — VANCOMYCIN HYDROCHLORIDE 1000 MG: 1 INJECTION, POWDER, LYOPHILIZED, FOR SOLUTION INTRAVENOUS at 03:06

## 2023-06-22 RX ADMIN — MUPIROCIN: 20 OINTMENT TOPICAL at 09:06

## 2023-06-22 RX ADMIN — VANCOMYCIN HYDROCHLORIDE 1000 MG: 1 INJECTION, POWDER, LYOPHILIZED, FOR SOLUTION INTRAVENOUS at 05:06

## 2023-06-22 RX ADMIN — THIAMINE HYDROCHLORIDE 500 MG: 100 INJECTION, SOLUTION INTRAMUSCULAR; INTRAVENOUS at 09:06

## 2023-06-22 RX ADMIN — RIFAXIMIN 550 MG: 550 TABLET ORAL at 10:06

## 2023-06-22 RX ADMIN — PANTOPRAZOLE SODIUM 40 MG: 40 INJECTION, POWDER, FOR SOLUTION INTRAVENOUS at 09:06

## 2023-06-22 RX ADMIN — PIPERACILLIN SODIUM AND TAZOBACTAM SODIUM 4.5 G: 4; .5 INJECTION, POWDER, LYOPHILIZED, FOR SOLUTION INTRAVENOUS at 03:06

## 2023-06-22 RX ADMIN — LACTULOSE 30 G: 10 SOLUTION ORAL at 12:06

## 2023-06-22 RX ADMIN — ALBUMIN (HUMAN) 25 G: 12.5 SOLUTION INTRAVENOUS at 09:06

## 2023-06-22 RX ADMIN — PIPERACILLIN SODIUM AND TAZOBACTAM SODIUM 4.5 G: 4; .5 INJECTION, POWDER, LYOPHILIZED, FOR SOLUTION INTRAVENOUS at 10:06

## 2023-06-22 RX ADMIN — POTASSIUM BICARBONATE 50 MEQ: 978 TABLET, EFFERVESCENT ORAL at 10:06

## 2023-06-22 RX ADMIN — MIDODRINE HYDROCHLORIDE 15 MG: 5 TABLET ORAL at 04:06

## 2023-06-22 RX ADMIN — ALBUMIN (HUMAN) 25 G: 12.5 SOLUTION INTRAVENOUS at 10:06

## 2023-06-22 RX ADMIN — MIDODRINE HYDROCHLORIDE 15 MG: 5 TABLET ORAL at 12:06

## 2023-06-22 RX ADMIN — RIFAXIMIN 550 MG: 550 TABLET ORAL at 08:06

## 2023-06-22 RX ADMIN — MIDODRINE HYDROCHLORIDE 15 MG: 5 TABLET ORAL at 08:06

## 2023-06-22 RX ADMIN — MUPIROCIN: 20 OINTMENT TOPICAL at 10:06

## 2023-06-22 NOTE — PT/OT/SLP PROGRESS
Speech Language Pathology Treatment    Patient Name:  Cornelio Rivers   MRN:  [id number]  Admitting Diagnosis: Decompensated hepatic cirrhosis    Recommendations:                 General Recommendations:  Dysphagia therapy  Diet recommendations:  Pleasure Feeding, Puree, Liquid Diet Level: Thin   Aspiration Precautions: 1 bite/sip at a time, Eliminate distractions, Feed only when awake/alert, HOB to 90 degrees, Meds crushed in puree, Puree for pleasure, Small bites/sips, and Strict aspiration precautions   General Precautions: Standard, aspiration, fall, pureed diet  Communication strategies:  none    Assessment:     Cornelio Rivers is a 58 y.o. male with an SLP diagnosis of Dysphagia.  He presents with continued lethargy.    Subjective     Pt asleep upon SLP entry. Pt's family member present at bedside. Pt difficult to rouse.     Pain/Comfort:  Pain Rating 1: 0/10    Respiratory Status: Nasal cannula    Objective:     Has the patient been evaluated by SLP for swallowing?   Yes  Keep patient NPO? No      Pt continues to present with significant lethargy limiting safe swallowing during intake. Per spouse, the pt tolerated pureed green beans and pureed meat without difficulty while awake/alert -- though despite being awake/alert, she endorsed ongoing confusion during intake. He continues to require max verbal, tactile, and environmental stim to rouse. Rousal was fleeting though he did maintain JOHN long enough to consume a single straw sip of water and x1 tsp pudding without overt s/s aspiration or airway compromise demonstrated. He did demonstrate mild oral holding due to his mentation at the time of the evaluation. SLP explained multiple risk factors for aspiration and only feeding the pt for pleasure purposes if he is awake/alert/accepting and seated with HOB completely upright. Pt's family member expressed agreement and understanding. Continue to monitor for signs and symptoms of aspiration and discontinue oral feeding  should you notice any of the following: watery eyes, reddened facial area, wet vocal quality, increased work of breathing, change in respiratory status, increased congestion, coughing, fever, etc.    Goals:   Multidisciplinary Problems       SLP Goals          Problem: SLP    Goal Priority Disciplines Outcome   SLP Goal     SLP Ongoing, Progressing   Description: Speech Language Pathology Goals  Goals expected to be met by 6/30    1. Pt will tolerate mechanical soft solids and thin liquids without any overt s/sx of airway compromise.  2. Pt will participate in ongoing swallow assessment to determine least restrictive PO diet.                                   Plan:     Patient to be seen:  3 x/week   Plan of Care expires:  07/16/23  Plan of Care reviewed with:  patient, spouse   SLP Follow-Up:  Yes       Discharge recommendations:  nursing facility, skilled    Time Tracking:     SLP Treatment Date:   06/22/23  Speech Start Time:  0849  Speech Stop Time:  0906     Speech Total Time (min):  17 min    Billable Minutes: Treatment Swallowing Dysfunction 9 and Self Care/Home Management Training 8  06/22/2023

## 2023-06-22 NOTE — SUBJECTIVE & OBJECTIVE
Interval History: Somewhat more awake today but remains profoundly encephalopathic.   EEG shows no seizures but metabolic encephalopathy in keeping with advanced liver disease and HE.   Wife at the bedside today. Discussed with  PA and bedside RN.     Past Medical History:   Diagnosis Date    Alcoholic cirrhosis of liver with ascites     Ascites     Coagulopathy     Esophageal varices with bleeding     Hepatic encephalopathy     Mixed hyperlipidemia     Portal hypertension     Thrombocytopenia, unspecified        Past Surgical History:   Procedure Laterality Date    BICEPS TENDON REPAIR Right     femur facture Right     HERNIA REPAIR Bilateral        Review of patient's allergies indicates:  No Known Allergies    Medications:  Continuous Infusions:  Scheduled Meds:   albumin human 25%  25 g Intravenous Q12H    [START ON 6/24/2023] ciprofloxacin HCl  500 mg Oral Daily    lactulose  30 g Oral Q6H    midodrine  15 mg Oral TID WM    mupirocin   Nasal BID    pantoprazole  40 mg Intravenous Daily    piperacillin-tazobactam (Zosyn) IV (PEDS and ADULTS) (extended infusion is not appropriate)  4.5 g Intravenous Q8H    rifAXImin  550 mg Oral BID    [START ON 6/23/2023] thiamine (VITAMIN B1) IVPB  250 mg Intravenous Daily    vancomycin (VANCOCIN) IVPB  1,000 mg Intravenous Q12H     PRN Meds:acetaminophen, albuterol-ipratropium, dextrose 10%, dextrose 10%, dextrose, dextrose, glucagon (human recombinant), melatonin, naloxone, ondansetron, sodium chloride 0.9%, Pharmacy to dose Vancomycin consult **AND** vancomycin - pharmacy to dose    Family History       Problem Relation (Age of Onset)    Heart disease Father    Hypertension Mother          Tobacco Use    Smoking status: Former     Types: Cigarettes    Smokeless tobacco: Not on file   Substance and Sexual Activity    Alcohol use: Not Currently    Drug use: Never    Sexual activity: Yes       Review of Systems   Unable to perform ROS: Mental status change   Objective:      Vital Signs (Most Recent):  Temp: 98.7 °F (37.1 °C) (06/22/23 0745)  Pulse: 99 (06/22/23 1130)  Resp: 18 (06/22/23 0437)  BP: (!) 98/54 (06/22/23 0745)  SpO2: 95 % (06/22/23 0745) Vital Signs (24h Range):  Temp:  [97.7 °F (36.5 °C)-98.7 °F (37.1 °C)] 98.7 °F (37.1 °C)  Pulse:  [] 99  Resp:  [18-20] 18  SpO2:  [93 %-97 %] 95 %  BP: ()/(54-64) 98/54     Weight: 85.7 kg (188 lb 15 oz)  Body mass index is 27.11 kg/m².       Physical Exam  Constitutional:       Comments: Somnlent, briefly arrousable. Does not track. Chronically ill appearing.    HENT:      Mouth/Throat:      Mouth: Mucous membranes are moist.      Pharynx: No oropharyngeal exudate.   Eyes:      General: Scleral icterus present.   Cardiovascular:      Rate and Rhythm: Normal rate.      Heart sounds: No murmur heard.  Pulmonary:      Effort: No respiratory distress.      Breath sounds: No wheezing.   Abdominal:      General: There is distension.      Tenderness: There is no abdominal tenderness. There is no guarding.   Musculoskeletal:         General: No swelling or deformity.      Cervical back: No rigidity or tenderness.   Skin:     General: Skin is warm.      Coloration: Skin is jaundiced.   Neurological:      General: No focal deficit present.      Motor: Weakness present.      Comments: Asterixis present. Hold up head. Opens eye briefly.           Review of Symptoms      Symptom Assessment (ESAS 0-10 Scale)  Pain:  0  Dyspnea:  0  Anxiety:  0  Nausea:  0  Depression:  0  Anorexia:  0  Fatigue:  0  Insomnia:  0  Restlessness:  0  Agitation:  0 due to Mental status change         Pain Assessment in Advanced Demential Scale (PAINAD)   Breathing - Independent of vocalization:  0  Negative vocalization:  0  Facial expression:  0  Body language:  0  Consolability:  0  Total:  0    Living Arrangements:  Lives with spouse    Psychosocial/Cultural:   See Palliative Psychosocial Note: No  Social Issues Identified: Coping deficit  pt/family  Bereavement Risk: No  Caregiver Needs Discussed. Caregiver Distress: Yes: Intensity of family caregiving  Cultural: none  **Primary  to Follow**  Palliative Care  Consult: No    Spiritual:  F - Ramya and Belief:  Yes  I - Importance:  Yes  C - Community:  Yes  A - Address in Care:  Yes     Time-Based Charting:  No      Advance Care Planning   Advance Directives:   Living Will: No    LaPOST: No    Do Not Resuscitate Status: No    Medical Power of : No      Decision Making:  Family answered questions  Goals of Care: What is most important right now is to focus on comfort and QOL. Accordingly, we have decided that the best plan to meet the patient's goals includes support while hospitalized but also considering comfort focused care after discharge       Significant Labs: CBC:   No results for input(s): WBC, HGB, HCT, PLT in the last 48 hours.    CMP:   Recent Labs   Lab 06/21/23 0424 06/22/23 0518   * 125*   K 3.4* 3.5   CL 84* 85*   CO2 32* 32*    129*   BUN 7 8   CREATININE 0.7 0.7   CALCIUM 8.3* 8.4*   PROT 5.4* 5.0*   ALBUMIN 2.4* 2.2*   BILITOT 12.9* 11.5*   ALKPHOS 153* 140*   AST 87* 80*   ALT 59* 55*   ANIONGAP 9 8       Coagulation:   Recent Labs   Lab 06/22/23 0518   INR 2.2*       CBC:   Recent Labs   Lab 06/20/23 0521   WBC 9.14   HGB 7.4*   HCT 22.1*   *   *       BMP:  Recent Labs   Lab 06/22/23 0518   *   *   K 3.5   CL 85*   CO2 32*   BUN 8   CREATININE 0.7   CALCIUM 8.4*       LFT:  Lab Results   Component Value Date    AST 80 (H) 06/22/2023    ALKPHOS 140 (H) 06/22/2023    BILITOT 11.5 (H) 06/22/2023     Albumin:   Albumin   Date Value Ref Range Status   06/22/2023 2.2 (L) 3.5 - 5.2 g/dL Final     Protein:   Total Protein   Date Value Ref Range Status   06/22/2023 5.0 (L) 6.0 - 8.4 g/dL Final     Lactic acid:   No results found for: LACTATE    Significant Imaging: CXR: I have reviewed all pertinent  results/findings within the past 24 hours:

## 2023-06-22 NOTE — ASSESSMENT & PLAN NOTE
Palliative Care Encounter / Goals of care discussion:     Narrative:   Cornelio Rivers is a 58 y.o. male patient with ESLD, MELD 35 >> 25, admitted with acute mental status changes in the setting of presumed SBP, LETICIA. Has persistent MS changes despite aggressive management of SBP and HE. Workup for alternative reasons for AMS underway, EEG pending and Neuro consult requested.   Not felt a LTX candidate due to poor social support, ongoing ETOH and severe MS changes.        1: Psychosocial :     - Marital status: , met with wife Michelle [phone]   - Children: 4 grown children live close to the family home in [place]  - Profession: HVAC tech    2: Medicolegal / Advance Care Planning     - Decision making Capacity: does not have capacity   - Advance directive: not on file - conversations between the couple have been had in the past   - Surrogate Decision Maker: wife Michelle    3: Support System:    - Spiritual: yes  - Family: limited but supportive.      4: Prognostication: felt to have poor prognosis with very limited life expectancy    5: Prior Goals of Care discussions: ongoing while hospitalized.     6: Goals of care Decisions / Symptom Management / Recommendations / Plan:     1: Encounter for Palliative Care    - Code Status: DNR    - Present for discussion: Wife is at the bedside    - Experience with critical illness: limited, but wife has been through a lot with pt. Past ESLD exacerbations.     - Insight in Disease and Illness trajectory: Ms. Rivers is aware that the pt. Has end stage liver disease and tells me that he can not get a transplant in his condition. She tells me about her conversation with Dr. Menendez earlier today and that she was made aware of a severely limited life expectancy. It came as a shock but not a surprise as she felt he was getting sicker.   She feels overwhelmed and distraught and has not been able to think past this hospitalization.      - Goals of care discussion:  "  6/22/23   - reviewed Neurology evaluation and EEG results with wife. She is aware that no treatable changes are found and that EEG confirms severe encephalopathy from advanced liver disease   - wife and I agree that the pt. Is somewhat more awake today but remains profoundly encephalopathic. Only time will tell if he can regain some level of cognition.    - Discussed disposition again. Outlined options for comfort care with home hospice vs. NH / SNF placement with more aggressive care and rehabilitation with the understanding that he may not qualify for SNF or be discharged prematurely if he remains too encephalopathic for therapy   - wife tells me that she "wants him home".      - we had prior discussions about hospice and what support and the care plan would look like.    - introduced the idea of a peritoneal drain today if comfort care and hospice is decided.    - Wife is hoping for a few more days in the hospital - she is clearly struggling with making and plans for the future.      - questions answered. Discussed with  PA and RN.    - will follow for support.     A total of 17 min was spent on advance care planning, goals of care discussion, emotional support, formulating and communicating prognosis and goals of care, exploring burden/benefit of various approaches of treatment.     6/21/23   - discussed hospice with patient at length.    - clarified notions that hospice is stopping everything and withdrawing fluid and food (she has a very poor understanding of hospice services).    - expressed that hospice focuses on comfort and well being while accepting the fact that there is a terminal illness that we can not cure.    - outlined the support available, aids, nurses, chaplains and SW while also requiring the family to be the primary caregiver.      - Matt has a neighbor that had her mother in hospice and I encouraged her to reach out to discuss the experience.      - Outside of new findings from EEG it " "appears that the pt. MS change stems from advanced liver disease. He may be hospice eligible if no new findings present themself.       - we agreed to await final recs from neurology and DR. Menendez to make any further plans for disposition.    - will follow up in AM.       6/20/23   - reviewed findings and expressed concerns that we may not be able to reverse the pt. Persistent and severe AMS.    - outlined that we hope that EEG and neuro eval will give additional treatment options, but also worry that his severe MS impairment may persist due to end stage liver disease - she agrees   - Wife tells me that she knew he was getting sicker over the past months and that her  also told her that he knew something was wrong...     - We discussed goals and limitations of care. Michelle tells me that they spoke about "what if" in the past and that her  never wanted to live like a vegetable. He did not want to go on machines.    - We agreed that in this setting DNR would be most appropriate and reflect the pt. Wishes and also prevent potential suffering without benefit.      - We also agreed to await formal evaluation by neurology to then decide the next steps in care. Wife is aware about hospice services and is open to hearing more once all the evaluations are available.     - Goals of Care: await Neurology evaluation.    No machines or CPR - DNR    - Approach to treatment:     - DNR entered in the chart and d/w Dr. Menendez.     2: Symptom Management:     - Pain: does not appear to be in pain  - Dyspnea: no breathless  - Anxiety: no anxiety    3: ESLD     - ETOH related, ongoing abuse  - MELD 35>> 25 driven by bilirubin  - not felt a transplant candidate  - history of variceal bleed and portal HTN (banding in the past_  - ascites s/p paracentesis and empiric therapy for SBP    4: AMS:  - evaluation underway - EEG and Neuro Consult to rule out seizures  - concerns that this is persistent severe HE vs. Other " encephalopathy      6: Summary and Recommendation:     - DNR   - ongoing conversations about disposition once specialists eval complete.      - Recommendations were discussed with Dr. estrada    7: Follow up plans:    Will follow    Thank you for your consult. Please call [phone] with questions.

## 2023-06-22 NOTE — NURSING
Pt is responds to pain and is DOX4 with little to no verbal communication ability. Wife at bedside has had a meeting with palliative and is considering hospice.

## 2023-06-22 NOTE — HOSPITAL COURSE
For patient presented at outside hospital transfer for transplant evaluation.  Transplant evaluation currently not an option given patient's persistent encephalopathy and inability to conduct the interview.  Patient's persistent encephalopathy without unclear etiology.  Patient is maximized on his lactulose dosing, he has been on and continuous EEG that any identifiable is seizure activity.  EEG demonstrating slow and disorganized background with waxing and waning runs of triphasic waves consistent with a moderate-severe encephalopathy with evidence of cortical irritation diffusely.  Given  lack of improvement, palliative consulted.  Patient's wife states that she would like for him to return home with her if all options are exhausted. We attempted maximal lactulose therapy for HE but unfortunately patient's symptoms were refractory. He had some periods where he was lucid enough to give one word answers or occasionally look around the room but would then regress again. Patient discharged to home with hospice and wife.

## 2023-06-22 NOTE — ASSESSMENT & PLAN NOTE
Waxing and waning mental status. Patient being treated for HE but still persistently encephalopathic. Neurology was consulted, patient placed on EEG. EEG showing slow and disorganized background with waxing and waning runs of triphasic waves consistent with a moderate-severe encephalopathy with evidence of cortical irritation diffusely which could be seen in liver disease  - Patient being treated for possible wernickes with IV thiamine  - pending further neurology recommendations

## 2023-06-22 NOTE — ASSESSMENT & PLAN NOTE
Platelet 107  - due to portal hypertension and splenic sequestration   - monitor trend   - transfuse for hgb <10k or <50k with active bleed

## 2023-06-22 NOTE — ASSESSMENT & PLAN NOTE
Cut down alcohol in 2020 when diagnosed with esophageal varices and cirrhosis   - last alcohol use 2-3 months ago per wife   - PETH negative  - unable to have psychiatry assessment as mental status has yet to improve.

## 2023-06-22 NOTE — ASSESSMENT & PLAN NOTE
Acutely worsening over the past few days in setting of continued diuretic use.  Diuretics were held 6/21 however the sodium remained 125.  Could be contributing to patient's encephalopathy  - Started albumin 25 g q.12 x2 doses on 06/22

## 2023-06-22 NOTE — SUBJECTIVE & OBJECTIVE
Interval History:   6/21: EEG in progress    Review of Systems   Unable to perform ROS: Mental status change (lethargy and confusion)   Objective:     Vital Signs (Most Recent):  Temp: 97.9 °F (36.6 °C) (06/22/23 1145)  Pulse: 81 (06/22/23 1536)  Resp: 18 (06/22/23 0745)  BP: 96/61 (06/22/23 1145)  SpO2: 95 % (06/22/23 1145) Vital Signs (24h Range):  Temp:  [97.7 °F (36.5 °C)-98.7 °F (37.1 °C)] 97.9 °F (36.6 °C)  Pulse:  [] 81  Resp:  [18-20] 18  SpO2:  [93 %-97 %] 95 %  BP: ()/(54-62) 96/61     Weight: 85.7 kg (188 lb 15 oz)  Body mass index is 27.11 kg/m².    Intake/Output Summary (Last 24 hours) at 6/22/2023 1554  Last data filed at 6/22/2023 0603  Gross per 24 hour   Intake 360 ml   Output 1150 ml   Net -790 ml           Physical Exam  Constitutional:       General: He is not in acute distress.     Appearance: He is well-developed. He is ill-appearing. He is not diaphoretic.   HENT:      Head: Normocephalic and atraumatic.      Nose: Nose normal.      Mouth/Throat:      Pharynx: No oropharyngeal exudate.   Eyes:      General: No scleral icterus.     Conjunctiva/sclera: Conjunctivae normal.      Pupils: Pupils are equal, round, and reactive to light.   Neck:      Thyroid: No thyromegaly.      Vascular: No JVD.      Trachea: No tracheal deviation.   Cardiovascular:      Rate and Rhythm: Normal rate and regular rhythm.      Heart sounds: Normal heart sounds. No murmur heard.  Pulmonary:      Effort: Pulmonary effort is normal. No respiratory distress.      Breath sounds: Rhonchi present. No wheezing or rales.   Chest:      Chest wall: No tenderness.   Abdominal:      General: Bowel sounds are normal. There is distension (moderate distention with ascites).      Palpations: Abdomen is soft. There is no mass.      Tenderness: There is abdominal tenderness (mild diffuse TTP w/o guarding). There is no guarding or rebound.      Hernia: A hernia (reducilble umbilical hernia) is present.   Musculoskeletal:          General: No tenderness.      Cervical back: Normal range of motion and neck supple.      Right lower leg: Edema present.      Left lower leg: Edema present.   Lymphadenopathy:      Cervical: No cervical adenopathy.   Skin:     General: Skin is warm and dry.      Coloration: Skin is jaundiced.      Findings: No erythema or rash.   Neurological:      Mental Status: He is alert.      Cranial Nerves: No cranial nerve deficit.      Motor: No abnormal muscle tone.      Coordination: Coordination normal.      Deep Tendon Reflexes: Reflexes are normal and symmetric. Reflexes normal.      Comments: Aox0, waxing and waning mental status. Limited neuro status due to unable to lack of patient cooperation. Withdraws to pain. Does not track around the room.           Significant Labs: All pertinent labs within the past 24 hours have been reviewed.    Significant Imaging: I have reviewed all pertinent imaging results/findings within the past 24 hours.

## 2023-06-22 NOTE — PLAN OF CARE
"CM met with wife at the bedside and wife was able to express her thoughts, fears and concerns.  Rogers stated, " I know he is not getting better and I just want to do what's right for him."  The wife was tearful during our conversations and mentioned, " I know he knows who I am and he seems like he responds." CM explained the the team will monitor him over the weekend and then decide on the next appropriate level of care.  Patient wife was explained the patient needs to be cognizant to follow directions if he needs PT/OT services at  SNF and also if he is not a candidate for liver transplant because of his mentation.  An EEG is in progress.  The wife is in agreement with the patient going home with hospice after the weekend monitoring by medical team.      1:15 pm  CM spoke with Elian Stallworth and Elian Stallworth will speak the patient's wife at the bedside.  CM called and left a VM on the patients phone and CM contact number left if the spouse had any additional questions or needs to address.     Dilia Aguirre RN  Case Management  [facility]  [phone],  "

## 2023-06-22 NOTE — PT/OT/SLP PROGRESS
Physical Therapy Treatment    Patient Name:  Cornelio Rivers   MRN:  [id number]    Recommendations:     Discharge Recommendations: nursing facility, skilled  Discharge Equipment Recommendations: hospital bed, lift device, wheelchair  Barriers to discharge:  decreased caregiver support; requiring increased skilled assist at this time    Assessment:     Cornelio Rivers is a 58 y.o. male admitted with a medical diagnosis of Decompensated hepatic cirrhosis.  He presents with the following impairments/functional limitations: weakness, impaired endurance, impaired self care skills, impaired functional mobility, gait instability, impaired balance, impaired cognition, decreased coordination, impaired coordination, decreased lower extremity function, decreased upper extremity function.  Pt would continue to benefit from P.T. To address impairments listed above.  .    Rehab Prognosis: Fair; patient would benefit from acute skilled PT services to address these deficits and reach maximum level of function.    Recent Surgery: * No surgery found *      Plan:     During this hospitalization, patient to be seen 3 x/week to address the identified rehab impairments via gait training, therapeutic activities, therapeutic exercises, neuromuscular re-education and progress toward the following goals:    Plan of Care Expires:  07/15/23    Subjective       Patient/Family Comments/goals: Pt non-verbal.  Pt's spouse present and okayed tx.    Pain/Comfort:  Pain Rating 1:  (Occasional grimacing with movement; unable to verbalize)  Pain Rating Post-Intervention 1:  (as above)      Objective:     Communicated with RN prior to session.  Patient found supine with telemetry, oxygen, peripheral IV, Condom Catheter upon PT entry to room.     General Precautions: Standard, aspiration, fall, pureed diet  Orthopedic Precautions: N/A  Braces: N/A  Respiratory Status:      Functional Mobility:  Bed Mobility:     Rolling Left:  total assistance and of 2  persons  Rolling Right: total assistance and of 2 persons  Scooting: total assistance, of 2 persons, and up to HOB twice.      AM-PAC 6 CLICK MOBILITY  Turning over in bed (including adjusting bedclothes, sheets and blankets)?: 1  Sitting down on and standing up from a chair with arms (e.g., wheelchair, bedside commode, etc.): 1  Moving from lying on back to sitting on the side of the bed?: 1  Moving to and from a bed to a chair (including a wheelchair)?: 1  Need to walk in hospital room?: 1  Climbing 3-5 steps with a railing?: 1  Basic Mobility Total Score: 6       Treatment & Education:  RN okayed tx only for today.  Pt looked at therapist when spoken too, but was non-verbal and did not follow verbal commands during tx.  Bed mobility as above requiring assist of two people with pt somewhat rigid and tremulous.  BLE PROM all available planes 12-15 reps x 2 with gentle stretch to HC/HS as able with pt tremulous throughout.  Pt repositioned for comfort at end of tx with pressure relief boots doffed for tx and donned back after tx.  RN notified.    Patient left HOB elevated with all lines intact, call button in reach, and RN notified..    GOALS:   Multidisciplinary Problems       Physical Therapy Goals          Problem: Physical Therapy    Goal Priority Disciplines Outcome Goal Variances Interventions   Physical Therapy Goal     PT, PT/OT Ongoing, Progressing     Description: Goals to be met by: 2023     Patient will increase functional independence with mobility by performin. Supine to sit with moderate assistance  2. Sit to supine with moderate assistance  3. Sit to stand transfer with maximal assistance  4. Bed to chair transfer with maximal assistance using LRAD as needed  5. Gait  x 10 feet with maximal assistance using LRAD as needed  6. Lower extremity exercise program x10 reps per handout, with supervision                        Time Tracking:     PT Received On: 23  PT Start Time: 1315      PT Stop Time: 1338  PT Total Time (min): 23 min     Billable Minutes: Therapeutic Activity 8 and Therapeutic Exercise 15       PT/PTA: PTA     Number of PTA visits since last PT visit: 3     06/22/2023

## 2023-06-22 NOTE — ASSESSMENT & PLAN NOTE
Patient currently DNR.  If patient's mental status does not improve within the next few days, patient's wife would like for him to return home with her under hospice services

## 2023-06-22 NOTE — ASSESSMENT & PLAN NOTE
INR remains elevated at 2.2 despite vitamin K  - Due to decomp liver cirrhosis. No signs of bleeding   - received FFP and vitamin K at outside hospital   - s/p vitamin K dailyx 3 days  - monitor INR daily

## 2023-06-22 NOTE — ASSESSMENT & PLAN NOTE
Alcoholic liver cirrhosis with ascites   Portal hypertension   Esophageal varices w/o bleeding   Hepatic encephalopathy   Coagulopathy   Thrombocytopenia     -admitted to [facility] ICU on 6/02 for presumed septic shock and hepatic encephalopathy (ammonia 102)  -treated with pressors, broad spectrum antibiotics and lactulose with some improvement of clinical status      MELD-Na: 30 at 6/22/2023  5:18 AM  MELD: 24 at 6/22/2023  5:18 AM  Calculated from:  Serum Creatinine: 0.7 mg/dL (Using min of 1 mg/dL) at 6/22/2023  5:18 AM  Serum Sodium: 125 mmol/L at 6/22/2023  5:18 AM  Total Bilirubin: 11.5 mg/dL at 6/22/2023  5:18 AM  INR(ratio): 2.2 at 6/22/2023  5:18 AM  No signs of active bleeding or overt sepsis. Paracentesis on 6/16 no SBP   -continue lactulose and rifaximin for hepatic encephalopathy (ammonia improved to 40)  - hold diuretics given hyponatremia  - TTE with EF of 70%  - PETH negative   - continue midodrine for borderline low BP   - continue protonix daily   - hepatology following  - on antibitoics as infection risk high and had episode hypothermia, treat empirically with vanc/zosyn for 5 days  - due to mental status no transplant evaluation opened yet  - palliative team following.

## 2023-06-22 NOTE — PROGRESS NOTES
[facility] - Intensive Care ([place] [id number])  Palliative Medicine  Progress Note    Patient Name: Cornelio Rivers  MRN: [id number]  Admission Date: 6/15/2023  Hospital Length of Stay: 7 days  Code Status: DNR   Attending Provider: Pascale Cotto MD  Consulting Provider: Matthew Castañeda MD  Primary Care Physician: Primary Doctor No  Principal Problem:Decompensated hepatic cirrhosis    Patient information was obtained from patient, spouse/SO and primary team.      Assessment/Plan:     Palliative Care  Palliative care encounter  Palliative Care Encounter / Goals of care discussion:     Narrative:   Cornelio Rivers is a 58 y.o. male patient with ESLD, MELD 35 >> 25, admitted with acute mental status changes in the setting of presumed SBP, LETICIA. Has persistent MS changes despite aggressive management of SBP and HE. Workup for alternative reasons for AMS underway, EEG pending and Neuro consult requested.   Not felt a LTX candidate due to poor social support, ongoing ETOH and severe MS changes.        1: Psychosocial :     - Marital status: , met with wife Michelle [phone]   - Children: 4 grown children live close to the family home in [place]  - Profession: HVAC tech    2: Medicolegal / Advance Care Planning     - Decision making Capacity: does not have capacity   - Advance directive: not on file - conversations between the couple have been had in the past   - Surrogate Decision Maker: wife Michelle    3: Support System:    - Spiritual: yes  - Family: limited but supportive.      4: Prognostication: felt to have poor prognosis with very limited life expectancy    5: Prior Goals of Care discussions: ongoing while hospitalized.     6: Goals of care Decisions / Symptom Management / Recommendations / Plan:     1: Encounter for Palliative Care    - Code Status: DNR    - Present for discussion: Wife is at the bedside    - Experience with critical illness: limited, but wife has been through a lot with pt. Past ESLD  "exacerbations.     - Insight in Disease and Illness trajectory: Ms. Rivers is aware that the pt. Has end stage liver disease and tells me that he can not get a transplant in his condition. She tells me about her conversation with Dr. Menendez earlier today and that she was made aware of a severely limited life expectancy. It came as a shock but not a surprise as she felt he was getting sicker.   She feels overwhelmed and distraught and has not been able to think past this hospitalization.      - Goals of care discussion:   6/22/23   - reviewed Neurology evaluation and EEG results with wife. She is aware that no treatable changes are found and that EEG confirms severe encephalopathy from advanced liver disease   - wife and I agree that the pt. Is somewhat more awake today but remains profoundly encephalopathic. Only time will tell if he can regain some level of cognition.    - Discussed disposition again. Outlined options for comfort care with home hospice vs. NH / SNF placement with more aggressive care and rehabilitation with the understanding that he may not qualify for SNF or be discharged prematurely if he remains too encephalopathic for therapy   - wife tells me that she "wants him home".      - we had prior discussions about hospice and what support and the care plan would look like.    - introduced the idea of a peritoneal drain today if comfort care and hospice is decided.    - Wife is hoping for a few more days in the hospital - she is clearly struggling with making and plans for the future.      - questions answered. Discussed with  PA and RN.    - will follow for support.     A total of 17 min was spent on advance care planning, goals of care discussion, emotional support, formulating and communicating prognosis and goals of care, exploring burden/benefit of various approaches of treatment.     6/21/23   - discussed hospice with patient at length.    - clarified notions that hospice is stopping " "everything and withdrawing fluid and food (she has a very poor understanding of hospice services).    - expressed that hospice focuses on comfort and well being while accepting the fact that there is a terminal illness that we can not cure.    - outlined the support available, aids, nurses, chaplains and SW while also requiring the family to be the primary caregiver.      - Matt has a neighbor that had her mother in hospice and I encouraged her to reach out to discuss the experience.      - Outside of new findings from EEG it appears that the pt. MS change stems from advanced liver disease. He may be hospice eligible if no new findings present themself.       - we agreed to await final recs from neurology and DR. Menendez to make any further plans for disposition.    - will follow up in AM.       6/20/23   - reviewed findings and expressed concerns that we may not be able to reverse the pt. Persistent and severe AMS.    - outlined that we hope that EEG and neuro eval will give additional treatment options, but also worry that his severe MS impairment may persist due to end stage liver disease - she agrees   - Wife tells me that she knew he was getting sicker over the past months and that her  also told her that he knew something was wrong...     - We discussed goals and limitations of care. Michelle tells me that they spoke about "what if" in the past and that her  never wanted to live like a vegetable. He did not want to go on machines.    - We agreed that in this setting DNR would be most appropriate and reflect the pt. Wishes and also prevent potential suffering without benefit.      - We also agreed to await formal evaluation by neurology to then decide the next steps in care. Wife is aware about hospice services and is open to hearing more once all the evaluations are available.     - Goals of Care: await Neurology evaluation.    No machines or CPR - DNR    - Approach to treatment:     - DNR " entered in the chart and d/w Dr. Menendez.     2: Symptom Management:     - Pain: does not appear to be in pain  - Dyspnea: no breathless  - Anxiety: no anxiety    3: ESLD     - ETOH related, ongoing abuse  - MELD 35>> 25 driven by bilirubin  - not felt a transplant candidate  - history of variceal bleed and portal HTN (banding in the past_  - ascites s/p paracentesis and empiric therapy for SBP    4: AMS:  - evaluation underway - EEG and Neuro Consult to rule out seizures  - concerns that this is persistent severe HE vs. Other encephalopathy      6: Summary and Recommendation:     - DNR   - ongoing conversations about disposition once specialists eval complete.      - Recommendations were discussed with Dr. menendez    7: Follow up plans:    Will follow    Thank you for your consult. Please call [phone] with questions.                     I will follow-up with patient. Please contact us if you have any additional questions.    Subjective:     Chief Complaint: No chief complaint on file.      HPI:   Cornelio Rivers is a unfortunate 57 yo gentleman with a history of alcoholic cirrhosis of the liver who had a significant episode with GI/variceal bleed, SBP and HE in about 2020. He has been astinent for some years but had a recent relapse. He has had a slow decline in functional capacity and health overall over the past 2-3 months per wife. He was admitted on 6/3 with acute decline in mental status, coagulopathy and LETICIA (MELD 32). He was admitted to [facility] in [place], treated in the ICU for SBP with shock, LETICIA and encephalopathy.   He is being transferred for evaluation by hepatology and consideration of liver transplant.   Since transfer, the pt. Has had persistent severe encephalopathy. His MELD has improved to ~25 now and paracentesis does not suggest ongoing SBP. Workup for his MS changes have been without clear culprit thus far. EEG is being performed and Neurology is consulted, however,  concerns persist that his AMS is result of ESLD. He is not felt a candidate for LTX.     In this setting I am asked to assist with goals of care and ACP discussions.     Discussed with Dr. Menendez at length.       Hospital Course:  No notes on file    Interval History: Somewhat more awake today but remains profoundly encephalopathic.   EEG shows no seizures but metabolic encephalopathy in keeping with advanced liver disease and HE.   Wife at the bedside today. Discussed with  PA and bedside RN.     Past Medical History:   Diagnosis Date    Alcoholic cirrhosis of liver with ascites     Ascites     Coagulopathy     Esophageal varices with bleeding     Hepatic encephalopathy     Mixed hyperlipidemia     Portal hypertension     Thrombocytopenia, unspecified        Past Surgical History:   Procedure Laterality Date    BICEPS TENDON REPAIR Right     femur facture Right     HERNIA REPAIR Bilateral        Review of patient's allergies indicates:  No Known Allergies    Medications:  Continuous Infusions:  Scheduled Meds:   albumin human 25%  25 g Intravenous Q12H    [START ON 6/24/2023] ciprofloxacin HCl  500 mg Oral Daily    lactulose  30 g Oral Q6H    midodrine  15 mg Oral TID WM    mupirocin   Nasal BID    pantoprazole  40 mg Intravenous Daily    piperacillin-tazobactam (Zosyn) IV (PEDS and ADULTS) (extended infusion is not appropriate)  4.5 g Intravenous Q8H    rifAXImin  550 mg Oral BID    [START ON 6/23/2023] thiamine (VITAMIN B1) IVPB  250 mg Intravenous Daily    vancomycin (VANCOCIN) IVPB  1,000 mg Intravenous Q12H     PRN Meds:acetaminophen, albuterol-ipratropium, dextrose 10%, dextrose 10%, dextrose, dextrose, glucagon (human recombinant), melatonin, naloxone, ondansetron, sodium chloride 0.9%, Pharmacy to dose Vancomycin consult **AND** vancomycin - pharmacy to dose    Family History       Problem Relation (Age of Onset)    Heart disease Father    Hypertension Mother          Tobacco Use     Smoking status: Former     Types: Cigarettes    Smokeless tobacco: Not on file   Substance and Sexual Activity    Alcohol use: Not Currently    Drug use: Never    Sexual activity: Yes       Review of Systems   Unable to perform ROS: Mental status change   Objective:     Vital Signs (Most Recent):  Temp: 98.7 °F (37.1 °C) (06/22/23 0745)  Pulse: 99 (06/22/23 1130)  Resp: 18 (06/22/23 0437)  BP: (!) 98/54 (06/22/23 0745)  SpO2: 95 % (06/22/23 0745) Vital Signs (24h Range):  Temp:  [97.7 °F (36.5 °C)-98.7 °F (37.1 °C)] 98.7 °F (37.1 °C)  Pulse:  [] 99  Resp:  [18-20] 18  SpO2:  [93 %-97 %] 95 %  BP: ()/(54-64) 98/54     Weight: 85.7 kg (188 lb 15 oz)  Body mass index is 27.11 kg/m².       Physical Exam  Constitutional:       Comments: Somnlent, briefly arrousable. Does not track. Chronically ill appearing.    HENT:      Mouth/Throat:      Mouth: Mucous membranes are moist.      Pharynx: No oropharyngeal exudate.   Eyes:      General: Scleral icterus present.   Cardiovascular:      Rate and Rhythm: Normal rate.      Heart sounds: No murmur heard.  Pulmonary:      Effort: No respiratory distress.      Breath sounds: No wheezing.   Abdominal:      General: There is distension.      Tenderness: There is no abdominal tenderness. There is no guarding.   Musculoskeletal:         General: No swelling or deformity.      Cervical back: No rigidity or tenderness.   Skin:     General: Skin is warm.      Coloration: Skin is jaundiced.   Neurological:      General: No focal deficit present.      Motor: Weakness present.      Comments: Asterixis present. Hold up head. Opens eye briefly.           Review of Symptoms      Symptom Assessment (ESAS 0-10 Scale)  Pain:  0  Dyspnea:  0  Anxiety:  0  Nausea:  0  Depression:  0  Anorexia:  0  Fatigue:  0  Insomnia:  0  Restlessness:  0  Agitation:  0 due to Mental status change         Pain Assessment in Advanced Demential Scale (PAINAD)   Breathing - Independent of  vocalization:  0  Negative vocalization:  0  Facial expression:  0  Body language:  0  Consolability:  0  Total:  0    Living Arrangements:  Lives with spouse    Psychosocial/Cultural:   See Palliative Psychosocial Note: No  Social Issues Identified: Coping deficit pt/family  Bereavement Risk: No  Caregiver Needs Discussed. Caregiver Distress: Yes: Intensity of family caregiving  Cultural: none  **Primary  to Follow**  Palliative Care  Consult: No    Spiritual:  F - Ramya and Belief:  Yes  I - Importance:  Yes  C - Community:  Yes  A - Address in Care:  Yes     Time-Based Charting:  No      Advance Care Planning   Advance Directives:   Living Will: No    LaPOST: No    Do Not Resuscitate Status: No    Medical Power of : No      Decision Making:  Family answered questions  Goals of Care: What is most important right now is to focus on comfort and QOL. Accordingly, we have decided that the best plan to meet the patient's goals includes support while hospitalized but also considering comfort focused care after discharge       Significant Labs: CBC:   No results for input(s): WBC, HGB, HCT, PLT in the last 48 hours.    CMP:   Recent Labs   Lab 06/21/23 0424 06/22/23 0518   * 125*   K 3.4* 3.5   CL 84* 85*   CO2 32* 32*    129*   BUN 7 8   CREATININE 0.7 0.7   CALCIUM 8.3* 8.4*   PROT 5.4* 5.0*   ALBUMIN 2.4* 2.2*   BILITOT 12.9* 11.5*   ALKPHOS 153* 140*   AST 87* 80*   ALT 59* 55*   ANIONGAP 9 8       Coagulation:   Recent Labs   Lab 06/22/23 0518   INR 2.2*       CBC:   Recent Labs   Lab 06/20/23 0521   WBC 9.14   HGB 7.4*   HCT 22.1*   *   *       BMP:  Recent Labs   Lab 06/22/23 0518   *   *   K 3.5   CL 85*   CO2 32*   BUN 8   CREATININE 0.7   CALCIUM 8.4*       LFT:  Lab Results   Component Value Date    AST 80 (H) 06/22/2023    ALKPHOS 140 (H) 06/22/2023    BILITOT 11.5 (H) 06/22/2023     Albumin:   Albumin   Date Value Ref Range Status    06/22/2023 2.2 (L) 3.5 - 5.2 g/dL Final     Protein:   Total Protein   Date Value Ref Range Status   06/22/2023 5.0 (L) 6.0 - 8.4 g/dL Final     Lactic acid:   No results found for: LACTATE    Significant Imaging: CXR: I have reviewed all pertinent results/findings within the past 24 hours:           Matthew Castañeda MD  Palliative Medicine  [facility] - Intensive Care ([place] [id number])

## 2023-06-22 NOTE — PT/OT/SLP PROGRESS
Occupational Therapy   Co-Treatment with PT    Name: Cornelio Rivers  MRN: [id number]  Admitting Diagnosis:  Decompensated hepatic cirrhosis       Recommendations:     Discharge Recommendations: nursing facility, skilled  Discharge Equipment Recommendations:  hospital bed, lift device, wheelchair  Barriers to discharge:       Assessment:     Cornelio Rivers is a 58 y.o. male with a medical diagnosis of Decompensated hepatic cirrhosis.  He presents with the following performance deficits affecting function: weakness, impaired endurance, impaired self care skills, impaired functional mobility, gait instability, impaired balance, decreased lower extremity function, decreased upper extremity function, abnormal tone, decreased ROM, impaired coordination, impaired fine motor.     Pt unable to respond to questions and commands. Pt requires total assistance of 2 persons for all bed mobility. PROM performed while supine.    Rehab Prognosis:  Fair; patient would benefit from acute skilled OT services to address these deficits and reach maximum level of function.       Plan:     Patient to be seen 3 x/week to address the above listed problems via self-care/home management, therapeutic activities, therapeutic exercises, neuromuscular re-education  Plan of Care Expires: 07/08/23  Plan of Care Reviewed with: patient, spouse    Subjective     Chief Complaint: unable to verbalize  Patient/Family Comments/goals: to get better  Pain/Comfort:  Pain Rating 1: other (see comments) (unable to verbalize, grimisicing)  Location - Side 1: Bilateral  Location - Orientation 1: generalized  Location 1: leg (during ex's)  Pain Addressed 1: Nurse notified  Pain Rating Post-Intervention 1:  (unable to verbalize)    Objective:     Communicated with: RN prior to session.  Patient found HOB elevated with telemetry, oxygen, peripheral IV, Condom Catheter (waffle mattress) upon OT entry to room.  A client care conference was completed by the OTR and the VALENTINE  prior to treatment by the VALENTINE to discuss the patient's POC and current status.    General Precautions: Standard, fall, aspiration, pureed diet    Orthopedic Precautions:N/A  Braces: N/A  Respiratory Status: Nasal cannula, flow 3 L/min     Occupational Performance:     Bed Mobility:    Patient completed Rolling/Turning to Left with  total assistance and 2 persons  Patient completed Rolling/Turning to Right with total assistance and 2 persons  Patient completed Scooting/Bridging with total assistance and 2 persons     Functional Mobility/Transfers:  Unable to perform    Activities of Daily Living:  Lower Body Dressing: total assistance to don socks      [facility] 6 Click ADL: 6    Treatment & Education:  Seated EOB, pt completed BUE therex PROM ([id number] each)  - Straight Arm Raises  - Bicep Curls  - finger flexion/extension  -wrist flexion/extension    Pt and spouse educated on OT POC and frequency during hospital stay.     Patient left HOB elevated with all lines intact, call button in reach, and RN notified    GOALS:   Multidisciplinary Problems       Occupational Therapy Goals          Problem: Occupational Therapy    Goal Priority Disciplines Outcome Interventions   Occupational Therapy Goal     OT, PT/OT Ongoing, Progressing    Description: Goals to be met by: 7/8/23     Patient will increase functional independence with ADLs by performing:    Grooming while seated with Moderate Assistance.  Sitting at edge of bed x5 minutes with Moderate Assistance.  Rolling to Right, Left with Moderate Assistance.   Supine to sit with Maximum Assistance.  Upper extremity exercise program x10 reps per handout, with assistance as needed.                         Time Tracking:     OT Date of Treatment: 06/22/23  OT Start Time: 1315  OT Stop Time: 1338  OT Total Time (min): 23 min    Billable Minutes:Therapeutic Activity 12  Therapeutic Exercise 11    OT/MARIELENA: MARIELENA     Number of MARIELENA visits since last OT visit: 1    6/22/2023

## 2023-06-22 NOTE — ASSESSMENT & PLAN NOTE
Nutrition consulted. Most recent weight and BMI monitored-     Measurements:  Wt Readings from Last 1 Encounters:   06/22/23 85.7 kg (188 lb 15 oz)   Body mass index is 27.11 kg/m².    Patient has been screened and assessed by RD.    Malnutrition Type:  Context: social/environmental circumstances  Level: moderate    Malnutrition Characteristic Summary:  Weight Loss (Malnutrition): 5% in 1 month  Energy Intake (Malnutrition): less than 75% for greater than or equal to 1 month    Interventions/Recommendations (treatment strategy):  1.

## 2023-06-22 NOTE — ASSESSMENT & PLAN NOTE
Had initial episode of bleeding in 2020 treated with banding   - no further bleeding episode since then per wife   - follows with local GI. Dr. Gallegos   - last EGD in February 2023 showed no bleeding from varices, but had portal hypertensive gastropathy   - continue protonix daily

## 2023-06-22 NOTE — CARE UPDATE
"RAPID RESPONSE NURSE CHART REVIEW       Chart Reviewed: 06/22/2023, 1:51 PM    MRN: [id number]  Bed: 06686/95920 A    Dx: Decompensated hepatic cirrhosis    Cornelio Rivers has a past medical history of Alcoholic cirrhosis of liver with ascites, Ascites, Coagulopathy, Esophageal varices with bleeding, Hepatic encephalopathy, Mixed hyperlipidemia, Portal hypertension, and Thrombocytopenia, unspecified.    Last VS: BP 96/61 (BP Location: Right arm, Patient Position: Lying)   Pulse 97   Temp 97.9 °F (36.6 °C) (Axillary)   Resp 18   Ht 5' 10" (1.778 m)   Wt 85.7 kg (188 lb 15 oz)   SpO2 95%   BMI 27.11 kg/m²     24H Vital Sign Range:  Temp:  [97.7 °F (36.5 °C)-98.7 °F (37.1 °C)]   Pulse:  []   Resp:  [18-20]   BP: ()/(54-62)   SpO2:  [93 %-97 %]     Level of Consciousness (AVPU): responds to pain    Recent Labs     06/20/23  0521   WBC 9.14   HGB 7.4*   HCT 22.1*   *       Recent Labs     06/20/23  0521 06/21/23  0424 06/22/23  0518   * 125* 125*   K 3.6 3.4* 3.5   CL 87* 84* 85*   CO2 30* 32* 32*   CREATININE 0.7 0.7 0.7   * 104 129*        No results for input(s): PH, PCO2, PO2, HCO3, POCSATURATED, BE in the last 72 hours.     OXYGEN:  Flow (L/min): 3  Oxygen Concentration (%): 93       MEWS score: 4    charge Yina THORNE  contacted. No concerns verbalized at this time. Instructed to call [phone] for further concerns or assistance.    Doyle Mcdermott RN        "

## 2023-06-22 NOTE — ASSESSMENT & PLAN NOTE
Ammonia improved with lactulose and rifaximin however patient still altered    - titrate lactulose to 3-4 BMs/day

## 2023-06-22 NOTE — PROCEDURES
[facility] EEG/VIDEO MONITORING REPORT  Cornelio Rivers  [id number]  1965    DATE OF SERVICE:  06/21/2023-06/22/2023  DATE OF ADMISSION: 6/15/2023 12:29 AM    ADMITTING/REQUESTING PROVIDER: Wood Hardy MD    REASON FOR CONSULT:  58-year-old man with cirrhosis and decreased responsiveness with lethargy.  Evaluate for evidence of epileptiform activity.    METHODOLOGY   Electroencephalographic (EEG) recording is with electrodes placed according to the International 10-20 placement system.  Thirty two (32) channels of digital signal (sampling rate of 512/sec) including T1 and T2 was simultaneously recorded from the scalp and may include  EKG, EMG, and/or eye monitors.  Recording band pass was 0.1 to 512 hz.  Digital video recording of the patient is simultaneously recorded with the EEG.  The patient is instructed report clinical symptoms which may occur during the recording session.  EEG and video recording is stored and archived in digital format.  Activation procedures which include photic stimulation, hyperventilation and instructing patients to perform simple task are done in selected patients.   The EEG is displayed on a monitor screen and can be reviewed using different montages.  Computer assisted analysis is employed to detect spike and electrographic seizure activity.   The entire record is submitted for computer analysis.  The entire recording is visually reviewed and the times identified by computer analysis as being spikes or seizures are reviewed again.  Compresses spectral analysis (CSA) is also performed on the activity recorded from each individual channel.  This is displayed as a power display of frequencies from 0 to 30 Hz over time.   The CSA is reviewed looking for asymmetries in power between homologous areas of the scalp and then compared with the original EEG recording.     Overwatch software is also utilized in the review of this study.  This software suite analyzes the EEG recording in multiple  domains.  Coherence and rhythmicity is computed to identify EEG sections which may contain organized seizures.  Each channel undergoes analysis to detect presence of spike and sharp waves which have special and morphological characteristic of epileptic activity.  The routine EEG recording is converted from spacial into frequency domain.  This is then displayed comparing homologous areas to identify areas of significant asymmetry.  Algorithm to identify non-cortically generated artifact is used to separate eye movement, EMG and other artifact from the EEG.      RECORDING TIMES  Start on 06/21/2023 at 07:00 a.m.  Stop on 06/22/2023 at 11:03 a.m.-> End of the Recording Session  A total of 27 hours and 44 minutes of EEG recording is obtained.    EEG FINDINGS  Background activity:   The background is continuous, slow, disorganized, with abundant generalized and multifocal triphasic waves seen in all quadrants which wax and wane.    There is a pushbutton activation at 16:11 p.m. for reasons that are not stated.  On video, the patient is lying quietly with multifocal myoclonic jerks of his extremities in the plane of the bed.  There are no significant changes on the electrographic record during this episode.    Sleep:  There is evidence of state transitions with the appearance of sleep architecture.    Activation procedures:   Hyperventilation is not performed  Photic stimulation is not performed    Cardiac Monitor:   Heart rate appears generally regular on a single lead EKG.    Impression:   This is an abnormal continuous EEG monitoring study because of a slow and disorganized background with waxing and waning runs of triphasic waves consistent with a moderate-severe encephalopathy with evidence of cortical irritation diffusely.  This pattern is frequently seen in the setting of hepatorenal dysfunction as well as a variety of other toxic/metabolic/infectious disorders.  There is a pushbutton activation when the patient  has irregular multifocal myoclonic jerks in the plane of the bed with no EEG correlate.  There are no discrete electrographic seizures.    Zuleyka Deng MD PhD [facility]  Neurology-Epilepsy  [facility].

## 2023-06-22 NOTE — PROGRESS NOTES
[facility] ([address] Medicine  Progress Note    Patient Name: Cornelio Rivers  MRN: [id number]  Patient Class: IP- Inpatient   Admission Date: 6/15/2023  Length of Stay: 7 days  Attending Physician: Pascale Cotto MD  Primary Care Provider: Primary Doctor No        Subjective:     Principal Problem:Decompensated hepatic cirrhosis        HPI:  Cornelio Rivers is a 58-year-old male with a history of alcoholic cirrhosis diagnosed in 2020 complicated by portal hypertension, bleeding esophageal varices s/p banding in 2020, ascites, thrombocytopenia, coagulopathy, hepatic encephalopathy, SBP, alcohol use, and hyperlipidemia who presents as a transfer from [facility] for management of decompensated liver cirrhosis. Patient was admitted to [facility] on June 2 with altered mental status, increasing weakness, low blood pressure, poor appetite, and possible pneumonia.  He had ammonia level 102 and a MELD score 32 (sodium 118, INR 2.2, total bilirubin 16.3, creatinine 1.02). Chest x-ray on admission had mild interstitial edema versus pneumonitis with findings suggestive of developing airspace disease or atelectasis in the right chest.  Initially he had hypotension with concern for septic shock and was treated in the ICU with pressors.  He was treated with broad-spectrum antibiotics.  Hemoglobin decreased during his stay and he received packed red blood cells, but he did not have signs of GI bleeding.  INR increased during his stay and he received vitamin K/FFP.  He gradually weaned off pressors and was transferred out of the ICU on June 8.  Mental status has not improved, and he remains intermittently lethargic.  Bilirubin was stable to slightly improved, but INR has worsened.  He was empirically started on Rocephin for SBP prophylaxis (no paracentesis with elevated INR//blood cultures with no growth so far).  He has persistent abdominal distention.  Current medications  include Xifaxan and lactulose, Protonix, midodrine, ceftriaxone and Lasix/Aldactone.  Last alcohol use was noted to be about 2 months ago. Current MELD score 29.  Referring team spoke with Hepatology at [facility] with plan to transfer for further evaluation.   Current diagnoses include hepatic encephalopathy, alcoholic hepatitis, coagulopathy, and anemia.     June 13: Sodium 141, potassium 3.4, chloride 105 CO2 30, BUN 11, creatinine 0.51, glucose 103, total bilirubin 13.8, , ALT 75, INR 3, white blood cells 8.3, hemoglobin 8.4, hematocrit 25.5, platelets 89     June 12: Sodium 142, potassium 3, chloride 106, CO2 29, BUN 10, creatinine 0.59, glucose 125, calcium 9.5, magnesium 1.64, bilirubin 13.5, , ALT 78, white blood cells 7.4, hemoglobin 8.3, hematocrit 25.5, platelets 109, INR 2.9     June 9:  Right upper extremity Doppler venous ultrasound had no DVT  -chest x-ray had stable patchy bilateral pulmonary infiltrates.     June 2: COVID negative, influenza negative  -gallbladder ultrasound noted moderate volume ascites.  Thick-walled gallbladder seen, nonspecific.  Internal gallbladder sludge.  Chest x-ray had mild interstitial edema or pneumonitis with findings suggestive of developing airspace disease or atelectasis in the right chest.  -CT head had no evidence of acute intracranial hemorrhage.  Some microvascular disease is present.     February 16, 2023: EGD had no significant recurrence of varices in the esophagus.  Moderate portal hypertensive gastropathy with friable mucosa.    During my interview upon arrival to [facility], patient with waxing and waning mental status. He open eyes upon calling name, speaks few words but then falls back to sleep. He is oriented to person only as he was able to tell his name and his wife's name correctly who is present at bedside. He is able to follow simple commands like open his mouth upon asking. Wife states patient had a long history of alcohol use prior to  2020 when he developed acute esophageal variceal bleeding and diagnosed with alcoholic liver cirrhosis. He has been following with local GI doctor Dr. Gallegos in [place] for cirrhosis. His last hospital admission was in January of this year when he was admitted with SBP and had 5 liter paracentesis. He was discharged home on course of prednisone and ciprofloxacin at that time. Wife states patient cut down alcohol since 2020 and his last alcohol use about 2-3 months ago. Wife noticed overall declining about 2 weeks prior to this hospital admission as he was getting more weak, confused, lethargy, losing weight and muscle mass, decreased appetite. On June 2nd wife noticed his blood pressure to be low in 70s when she drove him to [facility]. Denies tobacco, IVDU or other illicit drug use. He worked as a .          Overview/Hospital Course:  For patient presented at outside hospital transfer for transplant evaluation.  Transplant evaluation currently not an option given patient's persistent encephalopathy and inability to conduct the interview.  Patient's persistent encephalopathy without unclear etiology.  Patient is maximized on his lactulose dosing, he has been on and continuous EEG that any identifiable is seizure activity.  EEG demonstrating slow and disorganized background with waxing and waning runs of triphasic waves consistent with a moderate-severe encephalopathy with evidence of cortical irritation diffusely.  Urine lack of improvement, palliative consulted.  Patient's wife states that she would like for him to return home with her if all options are exhausted.  She however still wants to continue treatment for various causes.      Interval History:   6/21: EEG in progress    Review of Systems   Unable to perform ROS: Mental status change (lethargy and confusion)   Objective:     Vital Signs (Most Recent):  Temp: 97.9 °F (36.6 °C) (06/22/23 1145)  Pulse: 81 (06/22/23 1536)  Resp: 18  (06/22/23 0745)  BP: 96/61 (06/22/23 1145)  SpO2: 95 % (06/22/23 1145) Vital Signs (24h Range):  Temp:  [97.7 °F (36.5 °C)-98.7 °F (37.1 °C)] 97.9 °F (36.6 °C)  Pulse:  [] 81  Resp:  [18-20] 18  SpO2:  [93 %-97 %] 95 %  BP: ()/(54-62) 96/61     Weight: 85.7 kg (188 lb 15 oz)  Body mass index is 27.11 kg/m².    Intake/Output Summary (Last 24 hours) at 6/22/2023 1554  Last data filed at 6/22/2023 0603  Gross per 24 hour   Intake 360 ml   Output 1150 ml   Net -790 ml           Physical Exam  Constitutional:       General: He is not in acute distress.     Appearance: He is well-developed. He is ill-appearing. He is not diaphoretic.   HENT:      Head: Normocephalic and atraumatic.      Nose: Nose normal.      Mouth/Throat:      Pharynx: No oropharyngeal exudate.   Eyes:      General: No scleral icterus.     Conjunctiva/sclera: Conjunctivae normal.      Pupils: Pupils are equal, round, and reactive to light.   Neck:      Thyroid: No thyromegaly.      Vascular: No JVD.      Trachea: No tracheal deviation.   Cardiovascular:      Rate and Rhythm: Normal rate and regular rhythm.      Heart sounds: Normal heart sounds. No murmur heard.  Pulmonary:      Effort: Pulmonary effort is normal. No respiratory distress.      Breath sounds: Rhonchi present. No wheezing or rales.   Chest:      Chest wall: No tenderness.   Abdominal:      General: Bowel sounds are normal. There is distension (moderate distention with ascites).      Palpations: Abdomen is soft. There is no mass.      Tenderness: There is abdominal tenderness (mild diffuse TTP w/o guarding). There is no guarding or rebound.      Hernia: A hernia (reducilble umbilical hernia) is present.   Musculoskeletal:         General: No tenderness.      Cervical back: Normal range of motion and neck supple.      Right lower leg: Edema present.      Left lower leg: Edema present.   Lymphadenopathy:      Cervical: No cervical adenopathy.   Skin:     General: Skin is warm and  dry.      Coloration: Skin is jaundiced.      Findings: No erythema or rash.   Neurological:      Mental Status: He is alert.      Cranial Nerves: No cranial nerve deficit.      Motor: No abnormal muscle tone.      Coordination: Coordination normal.      Deep Tendon Reflexes: Reflexes are normal and symmetric. Reflexes normal.      Comments: Aox0, waxing and waning mental status. Limited neuro status due to unable to lack of patient cooperation. Withdraws to pain. Does not track around the room.           Significant Labs: All pertinent labs within the past 24 hours have been reviewed.    Significant Imaging: I have reviewed all pertinent imaging results/findings within the past 24 hours.      Assessment/Plan:      * Decompensated hepatic cirrhosis  Alcoholic liver cirrhosis with ascites   Portal hypertension   Esophageal varices w/o bleeding   Hepatic encephalopathy   Coagulopathy   Thrombocytopenia     -admitted to [facility] ICU on 6/02 for presumed septic shock and hepatic encephalopathy (ammonia 102)  -treated with pressors, broad spectrum antibiotics and lactulose with some improvement of clinical status      MELD-Na: 30 at 6/22/2023  5:18 AM  MELD: 24 at 6/22/2023  5:18 AM  Calculated from:  Serum Creatinine: 0.7 mg/dL (Using min of 1 mg/dL) at 6/22/2023  5:18 AM  Serum Sodium: 125 mmol/L at 6/22/2023  5:18 AM  Total Bilirubin: 11.5 mg/dL at 6/22/2023  5:18 AM  INR(ratio): 2.2 at 6/22/2023  5:18 AM  No signs of active bleeding or overt sepsis. Paracentesis on 6/16 no SBP   -continue lactulose and rifaximin for hepatic encephalopathy (ammonia improved to 40)  - hold diuretics given hyponatremia  - TTE with EF of 70%  - PETH negative   - continue midodrine for borderline low BP   - continue protonix daily   - hepatology following  - on antibitoics as infection risk high and had episode hypothermia, treat empirically with vanc/zosyn for 5 days  - due to mental status no transplant evaluation  opened yet  - palliative team following.         Hyponatremia  Acutely worsening over the past few days in setting of continued diuretic use.  Diuretics were held 6/21 however the sodium remained 125.  Could be contributing to patient's encephalopathy  - Started albumin 25 g q.12 x2 doses on 06/22       Moderate malnutrition  Nutrition consulted. Most recent weight and BMI monitored-     Measurements:  Wt Readings from Last 1 Encounters:   06/22/23 85.7 kg (188 lb 15 oz)   Body mass index is 27.11 kg/m².    Patient has been screened and assessed by RD.    Malnutrition Type:  Context: social/environmental circumstances  Level: moderate    Malnutrition Characteristic Summary:  Weight Loss (Malnutrition): 5% in 1 month  Energy Intake (Malnutrition): less than 75% for greater than or equal to 1 month    Interventions/Recommendations (treatment strategy):  1.    Palliative care encounter  Patient currently DNR.  If patient's mental status does not improve within the next few days, patient's wife would like for him to return home with her under hospice services       Encephalopathy, metabolic  Waxing and waning mental status. Patient being treated for HE but still persistently encephalopathic. Neurology was consulted, patient placed on EEG. EEG showing slow and disorganized background with waxing and waning runs of triphasic waves consistent with a moderate-severe encephalopathy with evidence of cortical irritation diffusely which could be seen in liver disease  - Patient being treated for possible wernickes with IV thiamine  - pending further neurology recommendations      Physical debility  -due to liver cirrhosis and prolonged hospital stay for 2 weeks   -PT evaluation and treat       Hypomagnesemia  -likely from diuretic use   -will replace with MgSO4      Hypokalemia  - CTM daily, especially with diuretic usage      Coagulopathy  INR remains elevated at 2.2 despite vitamin K  - Due to decomp liver cirrhosis. No signs  of bleeding   - received FFP and vitamin K at outside hospital   - s/p vitamin K dailyx 3 days  - monitor INR daily      Thrombocytopenia  Platelet 107  - due to portal hypertension and splenic sequestration   - monitor trend   - transfuse for hgb <10k or <50k with active bleed    Hepatic encephalopathy  Ammonia improved with lactulose and rifaximin however patient still altered    - titrate lactulose to 3-4 BMs/day       Secondary esophageal varices without bleeding  Had initial episode of bleeding in 2020 treated with banding   - no further bleeding episode since then per wife   - follows with local GI. Dr. Gallegos   - last EGD in February 2023 showed no bleeding from varices, but had portal hypertensive gastropathy   - continue protonix daily       Portal hypertension  -see above under decomp cirrhosis       Alcoholic cirrhosis of liver with ascites  Cut down alcohol in 2020 when diagnosed with esophageal varices and cirrhosis   - last alcohol use 2-3 months ago per wife   - PETH negative  - unable to have psychiatry assessment as mental status has yet to improve.           VTE Risk Mitigation (From admission, onward)         Ordered     IP VTE LOW RISK PATIENT  Once         06/15/23 0148     Place sequential compression device  Until discontinued         06/15/23 0148                Discharge Planning   NICKOLAS: 6/25/2023     Code Status: DNR   Is the patient medically ready for discharge?: No    Reason for patient still in hospital (select all that apply): Patient trending condition  Discharge Plan A: Skilled Nursing Facility   Discharge Delays: (!) Procedure Scheduling (IR, OR, Labs, Echo, Cath, Echo, EEG)              Pascale Cotto MD  Department of Hospital Medicine   [facility] - Intensive Care ([place])

## 2023-06-23 LAB
ALBUMIN SERPL BCP-MCNC: 2.9 G/DL (ref 3.5–5.2)
ALP SERPL-CCNC: 125 U/L (ref 55–135)
ALT SERPL W/O P-5'-P-CCNC: 49 U/L (ref 10–44)
ANION GAP SERPL CALC-SCNC: 9 MMOL/L (ref 8–16)
AST SERPL-CCNC: 73 U/L (ref 10–40)
BILIRUB SERPL-MCNC: 12.4 MG/DL (ref 0.1–1)
BUN SERPL-MCNC: 7 MG/DL (ref 6–20)
CALCIUM SERPL-MCNC: 9.3 MG/DL (ref 8.7–10.5)
CHLORIDE SERPL-SCNC: 87 MMOL/L (ref 95–110)
CO2 SERPL-SCNC: 29 MMOL/L (ref 23–29)
CREAT SERPL-MCNC: 0.7 MG/DL (ref 0.5–1.4)
EST. GFR  (NO RACE VARIABLE): >60 ML/MIN/1.73 M^2
GLUCOSE SERPL-MCNC: 120 MG/DL (ref 70–110)
INR PPP: 2.2 (ref 0.8–1.2)
MAGNESIUM SERPL-MCNC: 1.6 MG/DL (ref 1.6–2.6)
POTASSIUM SERPL-SCNC: 4 MMOL/L (ref 3.5–5.1)
PROT SERPL-MCNC: 5.4 G/DL (ref 6–8.4)
PROTHROMBIN TIME: 22.1 SEC (ref 9–12.5)
SMOOTH MUSCLE AB TITR SER IF: ABNORMAL {TITER}
SODIUM SERPL-SCNC: 125 MMOL/L (ref 136–145)

## 2023-06-23 PROCEDURE — C9113 INJ PANTOPRAZOLE SODIUM, VIA: HCPCS | Mod: NTX | Performed by: STUDENT IN AN ORGANIZED HEALTH CARE EDUCATION/TRAINING PROGRAM

## 2023-06-23 PROCEDURE — 99233 PR SUBSEQUENT HOSPITAL CARE,LEVL III: ICD-10-PCS | Mod: NTX,,, | Performed by: STUDENT IN AN ORGANIZED HEALTH CARE EDUCATION/TRAINING PROGRAM

## 2023-06-23 PROCEDURE — 20600001 HC STEP DOWN PRIVATE ROOM: Mod: NTX

## 2023-06-23 PROCEDURE — 99232 SBSQ HOSP IP/OBS MODERATE 35: CPT | Mod: NTX,,, | Performed by: INTERNAL MEDICINE

## 2023-06-23 PROCEDURE — 99900035 HC TECH TIME PER 15 MIN (STAT): Mod: NTX

## 2023-06-23 PROCEDURE — 99233 SBSQ HOSP IP/OBS HIGH 50: CPT | Mod: NTX,,, | Performed by: STUDENT IN AN ORGANIZED HEALTH CARE EDUCATION/TRAINING PROGRAM

## 2023-06-23 PROCEDURE — 83735 ASSAY OF MAGNESIUM: CPT | Mod: NTX | Performed by: STUDENT IN AN ORGANIZED HEALTH CARE EDUCATION/TRAINING PROGRAM

## 2023-06-23 PROCEDURE — P9047 ALBUMIN (HUMAN), 25%, 50ML: HCPCS | Mod: JZ,JG,NTX | Performed by: STUDENT IN AN ORGANIZED HEALTH CARE EDUCATION/TRAINING PROGRAM

## 2023-06-23 PROCEDURE — 25000003 PHARM REV CODE 250: Mod: NTX | Performed by: STUDENT IN AN ORGANIZED HEALTH CARE EDUCATION/TRAINING PROGRAM

## 2023-06-23 PROCEDURE — 27000221 HC OXYGEN, UP TO 24 HOURS: Mod: NTX

## 2023-06-23 PROCEDURE — 80053 COMPREHEN METABOLIC PANEL: CPT | Mod: NTX | Performed by: HOSPITALIST

## 2023-06-23 PROCEDURE — 25000003 PHARM REV CODE 250: Mod: NTX | Performed by: HOSPITALIST

## 2023-06-23 PROCEDURE — 63600175 PHARM REV CODE 636 W HCPCS: Mod: NTX | Performed by: STUDENT IN AN ORGANIZED HEALTH CARE EDUCATION/TRAINING PROGRAM

## 2023-06-23 PROCEDURE — 25500020 PHARM REV CODE 255: Mod: NTX | Performed by: STUDENT IN AN ORGANIZED HEALTH CARE EDUCATION/TRAINING PROGRAM

## 2023-06-23 PROCEDURE — 97535 SELF CARE MNGMENT TRAINING: CPT | Mod: NTX

## 2023-06-23 PROCEDURE — 85610 PROTHROMBIN TIME: CPT | Mod: NTX | Performed by: HOSPITALIST

## 2023-06-23 PROCEDURE — 92526 ORAL FUNCTION THERAPY: CPT | Mod: NTX

## 2023-06-23 PROCEDURE — 99232 PR SUBSEQUENT HOSPITAL CARE,LEVL II: ICD-10-PCS | Mod: NTX,,, | Performed by: INTERNAL MEDICINE

## 2023-06-23 PROCEDURE — 36415 COLL VENOUS BLD VENIPUNCTURE: CPT | Mod: NTX | Performed by: HOSPITALIST

## 2023-06-23 PROCEDURE — A9585 GADOBUTROL INJECTION: HCPCS | Mod: NTX | Performed by: STUDENT IN AN ORGANIZED HEALTH CARE EDUCATION/TRAINING PROGRAM

## 2023-06-23 RX ORDER — GADOBUTROL 604.72 MG/ML
9 INJECTION INTRAVENOUS
Status: COMPLETED | OUTPATIENT
Start: 2023-06-23 | End: 2023-06-23

## 2023-06-23 RX ORDER — ALBUMIN HUMAN 250 G/1000ML
25 SOLUTION INTRAVENOUS EVERY 12 HOURS
Status: COMPLETED | OUTPATIENT
Start: 2023-06-23 | End: 2023-06-23

## 2023-06-23 RX ORDER — LACTULOSE 10 G/15ML
200 SOLUTION ORAL; RECTAL 3 TIMES DAILY
Status: DISCONTINUED | OUTPATIENT
Start: 2023-06-23 | End: 2023-06-25

## 2023-06-23 RX ADMIN — ALBUMIN (HUMAN) 25 G: 12.5 SOLUTION INTRAVENOUS at 11:06

## 2023-06-23 RX ADMIN — RIFAXIMIN 550 MG: 550 TABLET ORAL at 09:06

## 2023-06-23 RX ADMIN — PIPERACILLIN SODIUM AND TAZOBACTAM SODIUM 4.5 G: 4; .5 INJECTION, POWDER, LYOPHILIZED, FOR SOLUTION INTRAVENOUS at 09:06

## 2023-06-23 RX ADMIN — LACTULOSE 30 G: 10 SOLUTION ORAL at 06:06

## 2023-06-23 RX ADMIN — ALBUMIN (HUMAN) 25 G: 12.5 SOLUTION INTRAVENOUS at 09:06

## 2023-06-23 RX ADMIN — PIPERACILLIN SODIUM AND TAZOBACTAM SODIUM 4.5 G: 4; .5 INJECTION, POWDER, LYOPHILIZED, FOR SOLUTION INTRAVENOUS at 12:06

## 2023-06-23 RX ADMIN — THIAMINE HYDROCHLORIDE 250 MG: 100 INJECTION, SOLUTION INTRAMUSCULAR; INTRAVENOUS at 09:06

## 2023-06-23 RX ADMIN — VANCOMYCIN HYDROCHLORIDE 1000 MG: 1 INJECTION, POWDER, LYOPHILIZED, FOR SOLUTION INTRAVENOUS at 04:06

## 2023-06-23 RX ADMIN — MIDODRINE HYDROCHLORIDE 15 MG: 5 TABLET ORAL at 05:06

## 2023-06-23 RX ADMIN — MUPIROCIN: 20 OINTMENT TOPICAL at 10:06

## 2023-06-23 RX ADMIN — LACTULOSE 200 G: 10 SOLUTION ORAL at 10:06

## 2023-06-23 RX ADMIN — MIDODRINE HYDROCHLORIDE 15 MG: 5 TABLET ORAL at 12:06

## 2023-06-23 RX ADMIN — VANCOMYCIN HYDROCHLORIDE 1000 MG: 1 INJECTION, POWDER, LYOPHILIZED, FOR SOLUTION INTRAVENOUS at 03:06

## 2023-06-23 RX ADMIN — PIPERACILLIN SODIUM AND TAZOBACTAM SODIUM 4.5 G: 4; .5 INJECTION, POWDER, LYOPHILIZED, FOR SOLUTION INTRAVENOUS at 06:06

## 2023-06-23 RX ADMIN — GADOBUTROL 9 ML: 604.72 INJECTION INTRAVENOUS at 02:06

## 2023-06-23 RX ADMIN — LACTULOSE 30 G: 10 SOLUTION ORAL at 12:06

## 2023-06-23 RX ADMIN — PANTOPRAZOLE SODIUM 40 MG: 40 INJECTION, POWDER, FOR SOLUTION INTRAVENOUS at 09:06

## 2023-06-23 RX ADMIN — MIDODRINE HYDROCHLORIDE 15 MG: 5 TABLET ORAL at 09:06

## 2023-06-23 RX ADMIN — LACTULOSE 200 G: 10 SOLUTION ORAL at 05:06

## 2023-06-23 NOTE — PROGRESS NOTES
[facility] - Intensive Care ([address] Medicine  Progress Note    Patient Name: Cornelio Rivers  MRN: [id number]  Patient Class: IP- Inpatient   Admission Date: 6/15/2023  Length of Stay: 8 days  Attending Physician: Pascale Cotto MD  Primary Care Provider: Primary Doctor No        Subjective:     Principal Problem:Decompensated hepatic cirrhosis        HPI:  Cornelio Rivers is a 58-year-old male with a history of alcoholic cirrhosis diagnosed in 2020 complicated by portal hypertension, bleeding esophageal varices s/p banding in 2020, ascites, thrombocytopenia, coagulopathy, hepatic encephalopathy, SBP, alcohol use, and hyperlipidemia who presents as a transfer from [facility] for management of decompensated liver cirrhosis. Patient was admitted to [facility] on June 2 with altered mental status, increasing weakness, low blood pressure, poor appetite, and possible pneumonia.  He had ammonia level 102 and a MELD score 32 (sodium 118, INR 2.2, total bilirubin 16.3, creatinine 1.02). Chest x-ray on admission had mild interstitial edema versus pneumonitis with findings suggestive of developing airspace disease or atelectasis in the right chest.  Initially he had hypotension with concern for septic shock and was treated in the ICU with pressors.  He was treated with broad-spectrum antibiotics.  Hemoglobin decreased during his stay and he received packed red blood cells, but he did not have signs of GI bleeding.  INR increased during his stay and he received vitamin K/FFP.  He gradually weaned off pressors and was transferred out of the ICU on June 8.  Mental status has not improved, and he remains intermittently lethargic.  Bilirubin was stable to slightly improved, but INR has worsened.  He was empirically started on Rocephin for SBP prophylaxis (no paracentesis with elevated INR//blood cultures with no growth so far).  He has persistent abdominal distention.  Current medications  include Xifaxan and lactulose, Protonix, midodrine, ceftriaxone and Lasix/Aldactone.  Last alcohol use was noted to be about 2 months ago. Current MELD score 29.  Referring team spoke with Hepatology at [facility] with plan to transfer for further evaluation.   Current diagnoses include hepatic encephalopathy, alcoholic hepatitis, coagulopathy, and anemia.     June 13: Sodium 141, potassium 3.4, chloride 105 CO2 30, BUN 11, creatinine 0.51, glucose 103, total bilirubin 13.8, , ALT 75, INR 3, white blood cells 8.3, hemoglobin 8.4, hematocrit 25.5, platelets 89     June 12: Sodium 142, potassium 3, chloride 106, CO2 29, BUN 10, creatinine 0.59, glucose 125, calcium 9.5, magnesium 1.64, bilirubin 13.5, , ALT 78, white blood cells 7.4, hemoglobin 8.3, hematocrit 25.5, platelets 109, INR 2.9     June 9:  Right upper extremity Doppler venous ultrasound had no DVT  -chest x-ray had stable patchy bilateral pulmonary infiltrates.     June 2: COVID negative, influenza negative  -gallbladder ultrasound noted moderate volume ascites.  Thick-walled gallbladder seen, nonspecific.  Internal gallbladder sludge.  Chest x-ray had mild interstitial edema or pneumonitis with findings suggestive of developing airspace disease or atelectasis in the right chest.  -CT head had no evidence of acute intracranial hemorrhage.  Some microvascular disease is present.     February 16, 2023: EGD had no significant recurrence of varices in the esophagus.  Moderate portal hypertensive gastropathy with friable mucosa.    During my interview upon arrival to [facility], patient with waxing and waning mental status. He open eyes upon calling name, speaks few words but then falls back to sleep. He is oriented to person only as he was able to tell his name and his wife's name correctly who is present at bedside. He is able to follow simple commands like open his mouth upon asking. Wife states patient had a long history of alcohol use prior to  2020 when he developed acute esophageal variceal bleeding and diagnosed with alcoholic liver cirrhosis. He has been following with local GI doctor Dr. Gallegos in [place] for cirrhosis. His last hospital admission was in January of this year when he was admitted with SBP and had 5 liter paracentesis. He was discharged home on course of prednisone and ciprofloxacin at that time. Wife states patient cut down alcohol since 2020 and his last alcohol use about 2-3 months ago. Wife noticed overall declining about 2 weeks prior to this hospital admission as he was getting more weak, confused, lethargy, losing weight and muscle mass, decreased appetite. On June 2nd wife noticed his blood pressure to be low in 70s when she drove him to [facility]. Denies tobacco, IVDU or other illicit drug use. He worked as a .          Overview/Hospital Course:  For patient presented at outside hospital transfer for transplant evaluation.  Transplant evaluation currently not an option given patient's persistent encephalopathy and inability to conduct the interview.  Patient's persistent encephalopathy without unclear etiology.  Patient is maximized on his lactulose dosing, he has been on and continuous EEG that any identifiable is seizure activity.  EEG demonstrating slow and disorganized background with waxing and waning runs of triphasic waves consistent with a moderate-severe encephalopathy with evidence of cortical irritation diffusely.  Urine lack of improvement, palliative consulted.  Patient's wife states that she would like for him to return home with her if all options are exhausted.  She however still wants to continue treatment for various causes.      Interval History:   No acute events overnight.  Patient this morning slightly more awake, evidence of occasional jerking motions seen.  EEG cap off. Patient able to track me for approximately 1 second before falling back asleep.  Patient's wife states that  "since he awoke this morning, he was more responsive.  She mentioned that she was missing with his feet earlier and he reportedly said, stopped that".  She mentioned that he would not had a bowel movement in some days, discussed trialing lactulose enemas.  We also discussed patient's MRI brain and that it did not have any evidence of acute stroke however there were some changes in the basal ganglia which may be related to liver disease however they were not specifically diagnostic.    Review of Systems   Unable to perform ROS: Mental status change (lethargy and confusion)   Objective:     Vital Signs (Most Recent):  Temp: 97.2 °F (36.2 °C) (06/23/23 1254)  Pulse: 89 (06/23/23 1254)  Resp: 16 (06/23/23 0800)  BP: 97/61 (06/23/23 1254)  SpO2: 96 % (06/23/23 1254) Vital Signs (24h Range):  Temp:  [97.1 °F (36.2 °C)-98.9 °F (37.2 °C)] 97.2 °F (36.2 °C)  Pulse:  [] 89  Resp:  [16-18] 16  SpO2:  [94 %-99 %] 96 %  BP: ()/(57-82) 97/61     Weight: 85.7 kg (188 lb 15 oz)  Body mass index is 27.11 kg/m².    Intake/Output Summary (Last 24 hours) at 6/23/2023 1410  Last data filed at 6/22/2023 1700  Gross per 24 hour   Intake --   Output 250 ml   Net -250 ml           Physical Exam  Constitutional:       General: He is not in acute distress.     Appearance: He is well-developed. He is ill-appearing. He is not diaphoretic.   HENT:      Head: Normocephalic and atraumatic.      Nose: Nose normal.      Mouth/Throat:      Pharynx: No oropharyngeal exudate.   Eyes:      General: No scleral icterus.     Conjunctiva/sclera: Conjunctivae normal.      Pupils: Pupils are equal, round, and reactive to light.   Neck:      Thyroid: No thyromegaly.      Vascular: No JVD.      Trachea: No tracheal deviation.   Cardiovascular:      Rate and Rhythm: Normal rate and regular rhythm.      Heart sounds: Normal heart sounds. No murmur heard.  Pulmonary:      Effort: Pulmonary effort is normal. No respiratory distress.      Breath sounds: " Rhonchi present. No wheezing or rales.   Chest:      Chest wall: No tenderness.   Abdominal:      General: Bowel sounds are normal. There is distension (moderate distention with ascites).      Palpations: Abdomen is soft. There is no mass.      Tenderness: There is abdominal tenderness (mild diffuse TTP w/o guarding). There is no guarding or rebound.      Hernia: A hernia (reducilble umbilical hernia) is present.   Musculoskeletal:         General: No tenderness.      Cervical back: Normal range of motion and neck supple.      Right lower leg: Edema present.      Left lower leg: Edema present.   Lymphadenopathy:      Cervical: No cervical adenopathy.   Skin:     General: Skin is warm and dry.      Coloration: Skin is jaundiced.      Findings: No erythema or rash.   Neurological:      Mental Status: He is alert.      Cranial Nerves: No cranial nerve deficit.      Motor: No abnormal muscle tone.      Coordination: Coordination normal.      Deep Tendon Reflexes: Reflexes are normal and symmetric. Reflexes normal.      Comments: Aox0, waxing and waning mental status. Limited neuro status due to unable to lack of patient cooperation. Withdraws to pain. Tracks minimally           Significant Labs: All pertinent labs within the past 24 hours have been reviewed.    Significant Imaging: I have reviewed all pertinent imaging results/findings within the past 24 hours.      Assessment/Plan:      * Decompensated hepatic cirrhosis  Alcoholic liver cirrhosis with ascites   Portal hypertension   Esophageal varices w/o bleeding   Hepatic encephalopathy   Coagulopathy   Thrombocytopenia     -admitted to [facility] ICU on 6/02 for presumed septic shock and hepatic encephalopathy (ammonia 102)  -treated with pressors, broad spectrum antibiotics and lactulose with some improvement of clinical status      MELD-Na: 30 at 6/22/2023  5:18 AM  MELD: 24 at 6/22/2023  5:18 AM  Calculated from:  Serum Creatinine: 0.7 mg/dL (Using  min of 1 mg/dL) at 6/22/2023  5:18 AM  Serum Sodium: 125 mmol/L at 6/22/2023  5:18 AM  Total Bilirubin: 11.5 mg/dL at 6/22/2023  5:18 AM  INR(ratio): 2.2 at 6/22/2023  5:18 AM  No signs of active bleeding or overt sepsis. Paracentesis on 6/16 no SBP   -continue lactulose and rifaximin for hepatic encephalopathy (ammonia improved to 40)  - hold diuretics given hyponatremia  - TTE with EF of 70%  - PETH negative   - continue midodrine for borderline low BP   - continue protonix daily   - hepatology following  - on antibitoics as infection risk high and had episode hypothermia, treat empirically with vanc/zosyn for 5 days  - due to mental status no transplant evaluation opened yet  - palliative team following.         Encephalopathy, metabolic  Waxing and waning mental status. Patient being treated for HE but still persistently encephalopathic. Neurology was consulted, patient placed on EEG. EEG showing slow and disorganized background with waxing and waning runs of triphasic waves consistent with a moderate-severe encephalopathy with evidence of cortical irritation diffusely which could be seen in liver disease  - Patient being treated for possible wernickes with IV thiamine  - MRI brain ordered 06/22/2023 which did not demonstrate any acute CVA however it did show there was some chronic changes in the basal ganglia which may be related to underlying liver disease however not particularly diagnostic  - pending further neurology recommendations      Hyponatremia  Acutely worsening over the past few days in setting of continued diuretic use.  Diuretics were held 6/21 however the sodium remained 125.  Could be contributing to patient's encephalopathy  - Started albumin 25 g q.12 x4 doses on 06/22   - May start NS if it does not improve after todays albumin doses      Moderate malnutrition  Nutrition consulted. Most recent weight and BMI monitored-     Measurements:  Wt Readings from Last 1 Encounters:   06/22/23 85.7 kg  (188 lb 15 oz)   Body mass index is 27.11 kg/m².    Patient has been screened and assessed by RD.    Malnutrition Type:  Context: social/environmental circumstances  Level: moderate    Malnutrition Characteristic Summary:  Weight Loss (Malnutrition): 5% in 1 month  Energy Intake (Malnutrition): less than 75% for greater than or equal to 1 month    Interventions/Recommendations (treatment strategy):  1.    Palliative care encounter  Patient currently DNR.  If patient's mental status does not improve within the next few days, patient's wife would like for him to return home with her under hospice services       Physical debility  -due to liver cirrhosis and prolonged hospital stay for 2 weeks   -PT evaluation and treat       Hypomagnesemia  -likely from diuretic use   -will replace with MgSO4      Hypokalemia  - CTM daily, especially with diuretic usage      Coagulopathy  INR remains elevated at 2.2 despite vitamin K  - Due to decomp liver cirrhosis. No signs of bleeding   - received FFP and vitamin K at outside hospital   - s/p vitamin K dailyx 3 days  - monitor INR daily      Thrombocytopenia  Platelet 107 6/22  - due to portal hypertension and splenic sequestration   - monitor trend   - transfuse for hgb <10k or <50k with active bleed    Hepatic encephalopathy  Grade 3 HE. Ammonia improved with lactulose and rifaximin however patient still altered  - given lack of bowel movements, lactulose switched to rectal lactulose  - continue rifaximin      Secondary esophageal varices without bleeding  Had initial episode of bleeding in 2020 treated with banding   - no further bleeding episode since then per wife   - follows with local GI. Dr. Gallegos   - last EGD in February 2023 showed no bleeding from varices, but had portal hypertensive gastropathy   - continue protonix daily       Portal hypertension  -see above under decomp cirrhosis       Alcoholic cirrhosis of liver with ascites  Cut down alcohol in 2020 when  diagnosed with esophageal varices and cirrhosis   - last alcohol use 2-3 months ago per wife   - PETH negative  - unable to have psychiatry assessment as mental status has yet to improve.           VTE Risk Mitigation (From admission, onward)         Ordered     IP VTE LOW RISK PATIENT  Once         06/15/23 0148     Place sequential compression device  Until discontinued         06/15/23 0148                Discharge Planning   NICKOLAS: 6/25/2023     Code Status: DNR   Is the patient medically ready for discharge?: No    Reason for patient still in hospital (select all that apply): Patient trending condition  Discharge Plan A: Skilled Nursing Facility   Discharge Delays: (!) Procedure Scheduling (IR, OR, Labs, Echo, Cath, Echo, EEG)              Pascale Cotto MD  Department of Hospital Medicine   [facility] - Intensive Care ([place]-)

## 2023-06-23 NOTE — SUBJECTIVE & OBJECTIVE
Interval History: Being feb by wife. Went down for MRI brain last night. No report available. Wife is unsure why image was taken.   No new aspects today, overall appears a tiny bit more interactive.     Past Medical History:   Diagnosis Date    Alcoholic cirrhosis of liver with ascites     Ascites     Coagulopathy     Esophageal varices with bleeding     Hepatic encephalopathy     Mixed hyperlipidemia     Portal hypertension     Thrombocytopenia, unspecified        Past Surgical History:   Procedure Laterality Date    BICEPS TENDON REPAIR Right     femur facture Right     HERNIA REPAIR Bilateral     MT EEG, W/VIDEO, CONT RECORD, I&R, >12<26 HRS  6/22/2023       Review of patient's allergies indicates:  No Known Allergies    Medications:  Continuous Infusions:  Scheduled Meds:   albumin human 25%  25 g Intravenous Q12H    [START ON 6/24/2023] ciprofloxacin HCl  500 mg Oral Daily    lactulose  30 g Oral Q6H    midodrine  15 mg Oral TID WM    mupirocin   Nasal BID    pantoprazole  40 mg Intravenous Daily    piperacillin-tazobactam (Zosyn) IV (PEDS and ADULTS) (extended infusion is not appropriate)  4.5 g Intravenous Q8H    rifAXImin  550 mg Oral BID    thiamine (VITAMIN B1) IVPB  250 mg Intravenous Daily    vancomycin (VANCOCIN) IVPB  1,000 mg Intravenous Q12H     PRN Meds:acetaminophen, albuterol-ipratropium, dextrose 10%, dextrose 10%, dextrose, dextrose, glucagon (human recombinant), melatonin, naloxone, ondansetron, sodium chloride 0.9%    Family History       Problem Relation (Age of Onset)    Heart disease Father    Hypertension Mother          Tobacco Use    Smoking status: Former     Types: Cigarettes    Smokeless tobacco: Not on file   Substance and Sexual Activity    Alcohol use: Not Currently    Drug use: Never    Sexual activity: Yes       Review of Systems   Unable to perform ROS: Mental status change   Objective:     Vital Signs (Most Recent):  Temp: 97.7 °F (36.5 °C) (06/23/23 0800)  Pulse: 103 (06/23/23  0800)  Resp: 16 (06/23/23 0800)  BP: 124/82 (06/23/23 0800)  SpO2: 96 % (06/23/23 0800) Vital Signs (24h Range):  Temp:  [97.1 °F (36.2 °C)-98.9 °F (37.2 °C)] 97.7 °F (36.5 °C)  Pulse:  [] 103  Resp:  [16-18] 16  SpO2:  [94 %-99 %] 96 %  BP: ()/(57-82) 124/82     Weight: 85.7 kg (188 lb 15 oz)  Body mass index is 27.11 kg/m².       Physical Exam  Constitutional:       Comments: Somnlent, briefly arrousable. Does not track. Chronically ill appearing.    HENT:      Mouth/Throat:      Mouth: Mucous membranes are moist.      Pharynx: No oropharyngeal exudate.   Eyes:      General: Scleral icterus present.   Cardiovascular:      Rate and Rhythm: Normal rate.      Heart sounds: No murmur heard.  Pulmonary:      Effort: No respiratory distress.      Breath sounds: No wheezing.   Abdominal:      General: There is distension.      Tenderness: There is no abdominal tenderness. There is no guarding.   Musculoskeletal:         General: No swelling or deformity.      Cervical back: No rigidity or tenderness.   Skin:     General: Skin is warm.      Coloration: Skin is jaundiced.   Neurological:      General: No focal deficit present.      Motor: Weakness present.      Comments: Asterixis present. Hold up head. Opens eye briefly.           Review of Symptoms      Symptom Assessment (ESAS 0-10 Scale)  Pain:  0  Dyspnea:  0  Anxiety:  0  Nausea:  0  Depression:  0  Anorexia:  0  Fatigue:  0  Insomnia:  0  Restlessness:  0  Agitation:  0 due to Mental status change         Pain Assessment in Advanced Demential Scale (PAINAD)   Breathing - Independent of vocalization:  0  Negative vocalization:  0  Facial expression:  0  Body language:  0  Consolability:  0  Total:  0    Living Arrangements:  Lives with spouse    Psychosocial/Cultural:   See Palliative Psychosocial Note: No  Social Issues Identified: Coping deficit pt/family  Bereavement Risk: No  Caregiver Needs Discussed. Caregiver Distress: Yes: Intensity of family  caregiving  Cultural: none  **Primary  to Follow**  Palliative Care  Consult: No    Spiritual:  F - Ramya and Belief:  Yes  I - Importance:  Yes  C - Community:  Yes  A - Address in Care:  Yes     Time-Based Charting:  No      Advance Care Planning   Advance Directives:   Living Will: No    LaPOST: No    Do Not Resuscitate Status: No    Medical Power of : No      Decision Making:  Family answered questions  Goals of Care: What is most important right now is to focus on comfort and QOL , and hopes for recovering. Accordingly, we have decided that the best plan to meet the patient's goals includes continuing with treatment.       Significant Labs: CBC:   No results for input(s): WBC, HGB, HCT, PLT in the last 48 hours.    CMP:   Recent Labs   Lab 06/22/23  0518 06/23/23  0511   * 125*   K 3.5 4.0   CL 85* 87*   CO2 32* 29   * 120*   BUN 8 7   CREATININE 0.7 0.7   CALCIUM 8.4* 9.3   PROT 5.0* 5.4*   ALBUMIN 2.2* 2.9*   BILITOT 11.5* 12.4*   ALKPHOS 140* 125   AST 80* 73*   ALT 55* 49*   ANIONGAP 8 9       Coagulation:   Recent Labs   Lab 06/23/23  0511   INR 2.2*       CBC:   Recent Labs   Lab 06/20/23  0521   WBC 9.14   HGB 7.4*   HCT 22.1*   *   *       BMP:  Recent Labs   Lab 06/23/23  0511   *   *   K 4.0   CL 87*   CO2 29   BUN 7   CREATININE 0.7   CALCIUM 9.3   MG 1.6       LFT:  Lab Results   Component Value Date    AST 73 (H) 06/23/2023    ALKPHOS 125 06/23/2023    BILITOT 12.4 (H) 06/23/2023     Albumin:   Albumin   Date Value Ref Range Status   06/23/2023 2.9 (L) 3.5 - 5.2 g/dL Final     Protein:   Total Protein   Date Value Ref Range Status   06/23/2023 5.4 (L) 6.0 - 8.4 g/dL Final     Lactic acid:   No results found for: LACTATE    Significant Imaging: CXR: I have reviewed all pertinent results/findings within the past 24 hours:

## 2023-06-23 NOTE — SUBJECTIVE & OBJECTIVE
"Interval History:   No acute events overnight.  Patient this morning slightly more awake, evidence of occasional jerking motions seen.  EEG cap off. Patient able to track me for approximately 1 second before falling back asleep.  Patient's wife states that since he awoke this morning, he was more responsive.  She mentioned that she was missing with his feet earlier and he reportedly said, stopped that".  She mentioned that he would not had a bowel movement in some days, discussed trialing lactulose enemas.  We also discussed patient's MRI brain and that it did not have any evidence of acute stroke however there were some changes in the basal ganglia which may be related to liver disease however they were not specifically diagnostic.    Review of Systems   Unable to perform ROS: Mental status change (lethargy and confusion)   Objective:     Vital Signs (Most Recent):  Temp: 97.2 °F (36.2 °C) (06/23/23 1254)  Pulse: 89 (06/23/23 1254)  Resp: 16 (06/23/23 0800)  BP: 97/61 (06/23/23 1254)  SpO2: 96 % (06/23/23 1254) Vital Signs (24h Range):  Temp:  [97.1 °F (36.2 °C)-98.9 °F (37.2 °C)] 97.2 °F (36.2 °C)  Pulse:  [] 89  Resp:  [16-18] 16  SpO2:  [94 %-99 %] 96 %  BP: ()/(57-82) 97/61     Weight: 85.7 kg (188 lb 15 oz)  Body mass index is 27.11 kg/m².    Intake/Output Summary (Last 24 hours) at 6/23/2023 1410  Last data filed at 6/22/2023 1700  Gross per 24 hour   Intake --   Output 250 ml   Net -250 ml           Physical Exam  Constitutional:       General: He is not in acute distress.     Appearance: He is well-developed. He is ill-appearing. He is not diaphoretic.   HENT:      Head: Normocephalic and atraumatic.      Nose: Nose normal.      Mouth/Throat:      Pharynx: No oropharyngeal exudate.   Eyes:      General: No scleral icterus.     Conjunctiva/sclera: Conjunctivae normal.      Pupils: Pupils are equal, round, and reactive to light.   Neck:      Thyroid: No thyromegaly.      Vascular: No JVD.      " Trachea: No tracheal deviation.   Cardiovascular:      Rate and Rhythm: Normal rate and regular rhythm.      Heart sounds: Normal heart sounds. No murmur heard.  Pulmonary:      Effort: Pulmonary effort is normal. No respiratory distress.      Breath sounds: Rhonchi present. No wheezing or rales.   Chest:      Chest wall: No tenderness.   Abdominal:      General: Bowel sounds are normal. There is distension (moderate distention with ascites).      Palpations: Abdomen is soft. There is no mass.      Tenderness: There is abdominal tenderness (mild diffuse TTP w/o guarding). There is no guarding or rebound.      Hernia: A hernia (reducilble umbilical hernia) is present.   Musculoskeletal:         General: No tenderness.      Cervical back: Normal range of motion and neck supple.      Right lower leg: Edema present.      Left lower leg: Edema present.   Lymphadenopathy:      Cervical: No cervical adenopathy.   Skin:     General: Skin is warm and dry.      Coloration: Skin is jaundiced.      Findings: No erythema or rash.   Neurological:      Mental Status: He is alert.      Cranial Nerves: No cranial nerve deficit.      Motor: No abnormal muscle tone.      Coordination: Coordination normal.      Deep Tendon Reflexes: Reflexes are normal and symmetric. Reflexes normal.      Comments: Aox0, waxing and waning mental status. Limited neuro status due to unable to lack of patient cooperation. Withdraws to pain. Tracks minimally           Significant Labs: All pertinent labs within the past 24 hours have been reviewed.    Significant Imaging: I have reviewed all pertinent imaging results/findings within the past 24 hours.

## 2023-06-23 NOTE — NURSING
Alert and confused today. Speech is slurred.VS stable. Wife at bedside. Large bm's x 2. Safety measures in place.

## 2023-06-23 NOTE — ASSESSMENT & PLAN NOTE
Platelet 107 6/22  - due to portal hypertension and splenic sequestration   - monitor trend   - transfuse for hgb <10k or <50k with active bleed

## 2023-06-23 NOTE — ASSESSMENT & PLAN NOTE
Acutely worsening over the past few days in setting of continued diuretic use.  Diuretics were held 6/21 however the sodium remained 125.  Could be contributing to patient's encephalopathy  - Started albumin 25 g q.12 x4 doses on 06/22   - May start NS if it does not improve after todays albumin doses

## 2023-06-23 NOTE — PLAN OF CARE
Pt responds to pain and garbles at times. SR-ST on tele. Twitch and hiccups present. 3L NC. Incontinent. Brief and condom cath in place. Distended abdomen. Jaundice. Bedfast. Pills crushed and mixed with applesauce. Waffle mattress on bed. Pt transported down for MRI overnight. Wife at bedside. Bed in lowest position and call light within reach. No acute events overnight.

## 2023-06-23 NOTE — CARE UPDATE
"RAPID RESPONSE NURSE CHART REVIEW       Chart Reviewed: 06/23/2023, 10:47 AM    MRN: [id number]  Bed: 80788/71397 A    Dx: Decompensated hepatic cirrhosis    Cornelio Rivers has a past medical history of Alcoholic cirrhosis of liver with ascites, Ascites, Coagulopathy, Esophageal varices with bleeding, Hepatic encephalopathy, Mixed hyperlipidemia, Portal hypertension, and Thrombocytopenia, unspecified.    Last VS: /82 (BP Location: Left arm, Patient Position: Lying)   Pulse 103   Temp 97.7 °F (36.5 °C) (Axillary)   Resp 16   Ht 5' 10" (1.778 m)   Wt 85.7 kg (188 lb 15 oz)   SpO2 96%   BMI 27.11 kg/m²     24H Vital Sign Range:  Temp:  [97.1 °F (36.2 °C)-98.9 °F (37.2 °C)]   Pulse:  []   Resp:  [16-18]   BP: ()/(57-82)   SpO2:  [94 %-99 %]     Level of Consciousness (AVPU): responds to pain    No results for input(s): CBC, WBC, HGB, HCT, PLT in the last 72 hours.    Recent Labs     06/21/23  0424 06/22/23  0518 06/23/23  0511   * 125* 125*   K 3.4* 3.5 4.0   CL 84* 85* 87*   CO2 32* 32* 29   CREATININE 0.7 0.7 0.7    129* 120*   MG  --   --  1.6        No results for input(s): PH, PCO2, PO2, HCO3, POCSATURATED, BE in the last 72 hours.     OXYGEN:  Flow (L/min): 3  Oxygen Concentration (%): 93       MEWS score: 4    Rounding completed with charge MATI Alcantara. contacted. No concerns verbalized at this time. Instructed to call [phone] for further concerns or assistance.    Juan Antonio Dodd RN        "

## 2023-06-23 NOTE — PROGRESS NOTES
Therapy with vancomycin complete after 1600 dose today. Pharmacy will sign off, please re-consult as needed.    Heather Joaquin, PharmD, BCPS  [phone]

## 2023-06-23 NOTE — PLAN OF CARE
CM spoke with wife during rounds and patient wife informed CM that she she spoke to Eliannicol Stallworth and requested a list of hospice facilities near patients home.      Dilia Aguirre RN  Case Management  [facility]  [phone]

## 2023-06-23 NOTE — PROGRESS NOTES
[facility] - [facility] ([place] [id number])  Palliative Medicine  Progress Note    Patient Name: Cornelio Rivers  MRN: [id number]  Admission Date: 6/15/2023  Hospital Length of Stay: 8 days  Code Status: DNR   Attending Provider: Pascale Cotto MD  Consulting Provider: Matthew Castañeda MD  Primary Care Physician: Primary Doctor No  Principal Problem:Decompensated hepatic cirrhosis    Patient information was obtained from patient, spouse/SO and primary team.      Assessment/Plan:     Palliative Care  Palliative care encounter  Palliative Care Encounter / Goals of care discussion:     Narrative:   Cornelio Rivers is a 58 y.o. male patient with ESLD, MELD 35 >> 25, admitted with acute mental status changes in the setting of presumed SBP, LETICIA. Has persistent MS changes despite aggressive management of SBP and HE. Workup for alternative reasons for AMS underway, EEG pending and Neuro consult requested.   Not felt a LTX candidate due to poor social support, ongoing ETOH and severe MS changes.        1: Psychosocial :     - Marital status: , met with wife Michelle [phone]   - Children: 4 grown children live close to the family home in [place]  - Profession: HVAC tech    2: Medicolegal / Advance Care Planning     - Decision making Capacity: does not have capacity   - Advance directive: not on file - conversations between the couple have been had in the past   - Surrogate Decision Maker: wife Michelle    3: Support System:    - Spiritual: yes  - Family: limited but supportive.      4: Prognostication: felt to have poor prognosis with very limited life expectancy    5: Prior Goals of Care discussions: ongoing while hospitalized.     6: Goals of care Decisions / Symptom Management / Recommendations / Plan:     1: Encounter for Palliative Care    - Code Status: DNR    - Present for discussion: Wife is at the bedside    - Experience with critical illness: limited, but wife has been through a lot with pt. Past ESLD  exacerbations.     - Insight in Disease and Illness trajectory: Ms. Rivers is aware that the pt. Has end stage liver disease and tells me that he can not get a transplant in his condition. She tells me about her conversation with Dr. Menendez earlier today and that she was made aware of a severely limited life expectancy. It came as a shock but not a surprise as she felt he was getting sicker.   She feels overwhelmed and distraught and has not been able to think past this hospitalization.      - Goals of care discussion:   6/23/23  - Some improvement in neurologic function overall but remains profoundly encephalopathic. MRI brain done (images reviewed and no gross findings on my view) result pending. Neuro workup shows no reversible etiology for mental status and Hepatic encephalopathy vs. wernickes vs. Others is discussed.   - Wife has expressed hopes for some improvement. We discussed options post hospital stay including home health, Hospice and SNF vs. NH.   - We had extensive discussions about what hospice care is and what it is not and she was advised to speak to family and friends about their experience.     - She has told me that she does not wish her  to suffer, she wants no machines and no aggressive interventions or him to be a vegetable.   - She does not want him in a nursing home but wishes to take him home.     - No further questions or need from me at this point. She knows to reach out if new aspects arise.   - Will sign off, please call with any questions. Thanks for the consult.     Matthew Castañeda MD  Sr. Staff Physician  [facility]  Palliative Medicine    6/22/23   - reviewed Neurology evaluation and EEG results with wife. She is aware that no treatable changes are found and that EEG confirms severe encephalopathy from advanced liver disease   - wife and I agree that the pt. Is somewhat more awake today but remains profoundly encephalopathic. Only time will tell if he can  "regain some level of cognition.    - Discussed disposition again. Outlined options for comfort care with home hospice vs. NH / SNF placement with more aggressive care and rehabilitation with the understanding that he may not qualify for SNF or be discharged prematurely if he remains too encephalopathic for therapy   - wife tells me that she "wants him home".      - we had prior discussions about hospice and what support and the care plan would look like.    - introduced the idea of a peritoneal drain today if comfort care and hospice is decided.    - Wife is hoping for a few more days in the hospital - she is clearly struggling with making and plans for the future.      - questions answered. Discussed with  PA and RN.    - will follow for support.     A total of 17 min was spent on advance care planning, goals of care discussion, emotional support, formulating and communicating prognosis and goals of care, exploring burden/benefit of various approaches of treatment.     6/21/23   - discussed hospice with patient at length.    - clarified notions that hospice is stopping everything and withdrawing fluid and food (she has a very poor understanding of hospice services).    - expressed that hospice focuses on comfort and well being while accepting the fact that there is a terminal illness that we can not cure.    - outlined the support available, aids, nurses, chaplains and SW while also requiring the family to be the primary caregiver.      - Matt has a neighbor that had her mother in hospice and I encouraged her to reach out to discuss the experience.      - Outside of new findings from EEG it appears that the pt. MS change stems from advanced liver disease. He may be hospice eligible if no new findings present themself.       - we agreed to await final recs from neurology and DR. Menendez to make any further plans for disposition.    - will follow up in AM.       6/20/23   - reviewed findings and expressed " "concerns that we may not be able to reverse the pt. Persistent and severe AMS.    - outlined that we hope that EEG and neuro eval will give additional treatment options, but also worry that his severe MS impairment may persist due to end stage liver disease - she agrees   - Wife tells me that she knew he was getting sicker over the past months and that her  also told her that he knew something was wrong...     - We discussed goals and limitations of care. Michelle tells me that they spoke about "what if" in the past and that her  never wanted to live like a vegetable. He did not want to go on machines.    - We agreed that in this setting DNR would be most appropriate and reflect the pt. Wishes and also prevent potential suffering without benefit.      - We also agreed to await formal evaluation by neurology to then decide the next steps in care. Wife is aware about hospice services and is open to hearing more once all the evaluations are available.     - Goals of Care: await Neurology evaluation.    No machines or CPR - DNR    - Approach to treatment:     - DNR entered in the chart and d/w Dr. Menendez.     2: Symptom Management:     - Pain: does not appear to be in pain  - Dyspnea: no breathless  - Anxiety: no anxiety    3: ESLD     - ETOH related, ongoing abuse  - MELD 35>> 25 driven by bilirubin  - not felt a transplant candidate  - history of variceal bleed and portal HTN (banding in the past_  - ascites s/p paracentesis and empiric therapy for SBP    4: AMS:  - evaluation underway - EEG and Neuro Consult to rule out seizures  - concerns that this is persistent severe HE vs. Other encephalopathy      6: Summary and Recommendation:     - DNR   - Disposition to be determined. Hospice was discussed.       7: Follow up plans:    Will sign off    Thank you for your consult. Please call [phone] with questions.                     I will sign off. Please contact us if you have any additional " questions.    Subjective:     Chief Complaint: No chief complaint on file.      HPI:   Cornelio Rivers is a unfortunate 59 yo gentleman with a history of alcoholic cirrhosis of the liver who had a significant episode with GI/variceal bleed, SBP and HE in about 2020. He has been astinent for some years but had a recent relapse. He has had a slow decline in functional capacity and health overall over the past 2-3 months per wife. He was admitted on 6/3 with acute decline in mental status, coagulopathy and LETICIA (MELD 32). He was admitted to [facility] in [place], treated in the ICU for SBP with shock, LETICIA and encephalopathy.   He is being transferred for evaluation by hepatology and consideration of liver transplant.   Since transfer, the pt. Has had persistent severe encephalopathy. His MELD has improved to ~25 now and paracentesis does not suggest ongoing SBP. Workup for his MS changes have been without clear culprit thus far. EEG is being performed and Neurology is consulted, however, concerns persist that his AMS is result of ESLD. He is not felt a candidate for LTX.     In this setting I am asked to assist with goals of care and ACP discussions.     Discussed with Dr. Menendez at length.       Hospital Course:  No notes on file    Interval History: Being feb by wife. Went down for MRI brain last night. No report available. Wife is unsure why image was taken.   No new aspects today, overall appears a tiny bit more interactive.     Past Medical History:   Diagnosis Date    Alcoholic cirrhosis of liver with ascites     Ascites     Coagulopathy     Esophageal varices with bleeding     Hepatic encephalopathy     Mixed hyperlipidemia     Portal hypertension     Thrombocytopenia, unspecified        Past Surgical History:   Procedure Laterality Date    BICEPS TENDON REPAIR Right     femur facture Right     HERNIA REPAIR Bilateral     MS EEG, W/VIDEO, CONT RECORD, I&R, >12<26 HRS  6/22/2023        Review of patient's allergies indicates:  No Known Allergies    Medications:  Continuous Infusions:  Scheduled Meds:   albumin human 25%  25 g Intravenous Q12H    [START ON 6/24/2023] ciprofloxacin HCl  500 mg Oral Daily    lactulose  30 g Oral Q6H    midodrine  15 mg Oral TID WM    mupirocin   Nasal BID    pantoprazole  40 mg Intravenous Daily    piperacillin-tazobactam (Zosyn) IV (PEDS and ADULTS) (extended infusion is not appropriate)  4.5 g Intravenous Q8H    rifAXImin  550 mg Oral BID    thiamine (VITAMIN B1) IVPB  250 mg Intravenous Daily    vancomycin (VANCOCIN) IVPB  1,000 mg Intravenous Q12H     PRN Meds:acetaminophen, albuterol-ipratropium, dextrose 10%, dextrose 10%, dextrose, dextrose, glucagon (human recombinant), melatonin, naloxone, ondansetron, sodium chloride 0.9%    Family History       Problem Relation (Age of Onset)    Heart disease Father    Hypertension Mother          Tobacco Use    Smoking status: Former     Types: Cigarettes    Smokeless tobacco: Not on file   Substance and Sexual Activity    Alcohol use: Not Currently    Drug use: Never    Sexual activity: Yes       Review of Systems   Unable to perform ROS: Mental status change   Objective:     Vital Signs (Most Recent):  Temp: 97.7 °F (36.5 °C) (06/23/23 0800)  Pulse: 103 (06/23/23 0800)  Resp: 16 (06/23/23 0800)  BP: 124/82 (06/23/23 0800)  SpO2: 96 % (06/23/23 0800) Vital Signs (24h Range):  Temp:  [97.1 °F (36.2 °C)-98.9 °F (37.2 °C)] 97.7 °F (36.5 °C)  Pulse:  [] 103  Resp:  [16-18] 16  SpO2:  [94 %-99 %] 96 %  BP: ()/(57-82) 124/82     Weight: 85.7 kg (188 lb 15 oz)  Body mass index is 27.11 kg/m².       Physical Exam  Constitutional:       Comments: Somnlent, briefly arrousable. Does not track. Chronically ill appearing.    HENT:      Mouth/Throat:      Mouth: Mucous membranes are moist.      Pharynx: No oropharyngeal exudate.   Eyes:      General: Scleral icterus present.   Cardiovascular:      Rate  and Rhythm: Normal rate.      Heart sounds: No murmur heard.  Pulmonary:      Effort: No respiratory distress.      Breath sounds: No wheezing.   Abdominal:      General: There is distension.      Tenderness: There is no abdominal tenderness. There is no guarding.   Musculoskeletal:         General: No swelling or deformity.      Cervical back: No rigidity or tenderness.   Skin:     General: Skin is warm.      Coloration: Skin is jaundiced.   Neurological:      General: No focal deficit present.      Motor: Weakness present.      Comments: Asterixis present. Hold up head. Opens eye briefly.           Review of Symptoms      Symptom Assessment (ESAS 0-10 Scale)  Pain:  0  Dyspnea:  0  Anxiety:  0  Nausea:  0  Depression:  0  Anorexia:  0  Fatigue:  0  Insomnia:  0  Restlessness:  0  Agitation:  0 due to Mental status change         Pain Assessment in Advanced Demential Scale (PAINAD)   Breathing - Independent of vocalization:  0  Negative vocalization:  0  Facial expression:  0  Body language:  0  Consolability:  0  Total:  0    Living Arrangements:  Lives with spouse    Psychosocial/Cultural:   See Palliative Psychosocial Note: No  Social Issues Identified: Coping deficit pt/family  Bereavement Risk: No  Caregiver Needs Discussed. Caregiver Distress: Yes: Intensity of family caregiving  Cultural: none  **Primary  to Follow**  Palliative Care  Consult: No    Spiritual:  F - Ramya and Belief:  Yes  I - Importance:  Yes  C - Community:  Yes  A - Address in Care:  Yes     Time-Based Charting:  No      Advance Care Planning   Advance Directives:   Living Will: No    LaPOST: No    Do Not Resuscitate Status: No    Medical Power of : No      Decision Making:  Family answered questions  Goals of Care: What is most important right now is to focus on comfort and QOL , and hopes for recovering. Accordingly, we have decided that the best plan to meet the patient's goals includes continuing with  treatment.       Significant Labs: CBC:   No results for input(s): WBC, HGB, HCT, PLT in the last 48 hours.    CMP:   Recent Labs   Lab 06/22/23  0518 06/23/23  0511   * 125*   K 3.5 4.0   CL 85* 87*   CO2 32* 29   * 120*   BUN 8 7   CREATININE 0.7 0.7   CALCIUM 8.4* 9.3   PROT 5.0* 5.4*   ALBUMIN 2.2* 2.9*   BILITOT 11.5* 12.4*   ALKPHOS 140* 125   AST 80* 73*   ALT 55* 49*   ANIONGAP 8 9       Coagulation:   Recent Labs   Lab 06/23/23  0511   INR 2.2*       CBC:   Recent Labs   Lab 06/20/23  0521   WBC 9.14   HGB 7.4*   HCT 22.1*   *   *       BMP:  Recent Labs   Lab 06/23/23  0511   *   *   K 4.0   CL 87*   CO2 29   BUN 7   CREATININE 0.7   CALCIUM 9.3   MG 1.6       LFT:  Lab Results   Component Value Date    AST 73 (H) 06/23/2023    ALKPHOS 125 06/23/2023    BILITOT 12.4 (H) 06/23/2023     Albumin:   Albumin   Date Value Ref Range Status   06/23/2023 2.9 (L) 3.5 - 5.2 g/dL Final     Protein:   Total Protein   Date Value Ref Range Status   06/23/2023 5.4 (L) 6.0 - 8.4 g/dL Final     Lactic acid:   No results found for: LACTATE    Significant Imaging: CXR: I have reviewed all pertinent results/findings within the past 24 hours:           Matthew Castañeda MD  Palliative Medicine  [facility] - Intensive Care ([place] [id number])

## 2023-06-23 NOTE — PT/OT/SLP PROGRESS
Speech Language Pathology Treatment    Patient Name:  Cornelio Rivers   MRN:  [id number]  Admitting Diagnosis: Decompensated hepatic cirrhosis    Recommendations:                 General Recommendations:  Dysphagia therapy  Diet recommendations:  Pleasure Feeding, Puree, Liquid Diet Level: Thin   Aspiration Precautions: 1 bite/sip at a time, Eliminate distractions, Feed only when awake/alert, HOB to 90 degrees, Meds crushed in puree, Puree for pleasure, Small bites/sips, and Strict aspiration precautions   General Precautions: Standard, fall, aspiration, pureed diet  Communication strategies:  none    Assessment:     Cornelio Rivers is a 58 y.o. male with an SLP diagnosis of Dysphagia.  He presents with mildly improved alertness this service date.    Subjective     Pt asleep upon SLP entry. Pt's family member present at bedside.    Pain/Comfort:  Pain Rating 1: 0/10  Pain Rating Post-Intervention 1: 0/10    Respiratory Status: Nasal cannula    Objective:     Has the patient been evaluated by SLP for swallowing?   Yes  Keep patient NPO? No      Pt presents with generally improved JOHN, though swallow safety remains precarious 2/2 persistent waxing and waning presentation. Per spouse, he continues to tolerate the small, limited amounts of po intake accepted during times of wakefulness. Pt accepted x2 tsp cream of wheat and x2 straw sips water with delayed swallow initiation. No overt s/s airway compromise appreciated. SLP explained multiple risk factors for aspiration and only feeding the pt for pleasure purposes if he is awake/alert/accepting and seated with HOB completely upright. Pt's family member expressed agreement and understanding. Continue to monitor for signs and symptoms of aspiration and discontinue oral feeding should you notice any of the following: watery eyes, reddened facial area, wet vocal quality, increased work of breathing, change in respiratory status, increased congestion, coughing, fever, etc.    Goals:    Multidisciplinary Problems       SLP Goals          Problem: SLP    Goal Priority Disciplines Outcome   SLP Goal     SLP Ongoing, Progressing   Description: Speech Language Pathology Goals  Goals expected to be met by 6/30  1. Pt will tolerate mechanical soft solids and thin liquids without any overt s/sx of airway compromise.  2. Pt will participate in ongoing swallow assessment to determine least restrictive PO diet.                                   Plan:     Patient to be seen:  3 x/week   Plan of Care expires:  07/16/23  Plan of Care reviewed with:  patient, spouse   SLP Follow-Up:  Yes       Discharge recommendations:  nursing facility, skilled    Time Tracking:     SLP Treatment Date:   06/23/23  Speech Start Time:  0936  Speech Stop Time:  0947     Speech Total Time (min):  11 min    Billable Minutes: Treatment Swallowing Dysfunction 11  06/23/2023

## 2023-06-23 NOTE — ASSESSMENT & PLAN NOTE
Grade 3 HE. Ammonia improved with lactulose and rifaximin however patient still altered  - given lack of bowel movements, lactulose switched to rectal lactulose  - continue rifaximin

## 2023-06-23 NOTE — ASSESSMENT & PLAN NOTE
Waxing and waning mental status. Patient being treated for HE but still persistently encephalopathic. Neurology was consulted, patient placed on EEG. EEG showing slow and disorganized background with waxing and waning runs of triphasic waves consistent with a moderate-severe encephalopathy with evidence of cortical irritation diffusely which could be seen in liver disease  - Patient being treated for possible wernickes with IV thiamine  - MRI brain ordered 06/22/2023 which did not demonstrate any acute CVA however it did show there was some chronic changes in the basal ganglia which may be related to underlying liver disease however not particularly diagnostic  - pending further neurology recommendations

## 2023-06-23 NOTE — ASSESSMENT & PLAN NOTE
Palliative Care Encounter / Goals of care discussion:     Narrative:   Cornelio Rivers is a 58 y.o. male patient with ESLD, MELD 35 >> 25, admitted with acute mental status changes in the setting of presumed SBP, LETICIA. Has persistent MS changes despite aggressive management of SBP and HE. Workup for alternative reasons for AMS underway, EEG pending and Neuro consult requested.   Not felt a LTX candidate due to poor social support, ongoing ETOH and severe MS changes.        1: Psychosocial :     - Marital status: , met with wife Michelle [phone]   - Children: 4 grown children live close to the family home in [place]  - Profession: HVAC tech    2: Medicolegal / Advance Care Planning     - Decision making Capacity: does not have capacity   - Advance directive: not on file - conversations between the couple have been had in the past   - Surrogate Decision Maker: wife Michelle    3: Support System:    - Spiritual: yes  - Family: limited but supportive.      4: Prognostication: felt to have poor prognosis with very limited life expectancy    5: Prior Goals of Care discussions: ongoing while hospitalized.     6: Goals of care Decisions / Symptom Management / Recommendations / Plan:     1: Encounter for Palliative Care    - Code Status: DNR    - Present for discussion: Wife is at the bedside    - Experience with critical illness: limited, but wife has been through a lot with pt. Past ESLD exacerbations.     - Insight in Disease and Illness trajectory: Ms. Rivers is aware that the pt. Has end stage liver disease and tells me that he can not get a transplant in his condition. She tells me about her conversation with Dr. Menendez earlier today and that she was made aware of a severely limited life expectancy. It came as a shock but not a surprise as she felt he was getting sicker.   She feels overwhelmed and distraught and has not been able to think past this hospitalization.      - Goals of care discussion:  "  6/23/23  - Some improvement in neurologic function overall but remains profoundly encephalopathic. MRI brain done (images reviewed and no gross findings on my view) result pending. Neuro workup shows no reversible etiology for mental status and Hepatic encephalopathy vs. wernickes vs. Others is discussed.   - Wife has expressed hopes for some improvement. We discussed options post hospital stay including home health, Hospice and SNF vs. NH.   - We had extensive discussions about what hospice care is and what it is not and she was advised to speak to family and friends about their experience.     - She has told me that she does not wish her  to suffer, she wants no machines and no aggressive interventions or him to be a vegetable.   - She does not want him in a nursing home but wishes to take him home.     - No further questions or need from me at this point. She knows to reach out if new aspects arise.   - Will sign off, please call with any questions. Thanks for the consult.     Matthew Castañeda MD  Sr. Staff Physician  [facility]  Palliative Medicine    6/22/23   - reviewed Neurology evaluation and EEG results with wife. She is aware that no treatable changes are found and that EEG confirms severe encephalopathy from advanced liver disease   - wife and I agree that the pt. Is somewhat more awake today but remains profoundly encephalopathic. Only time will tell if he can regain some level of cognition.    - Discussed disposition again. Outlined options for comfort care with home hospice vs. NH / SNF placement with more aggressive care and rehabilitation with the understanding that he may not qualify for SNF or be discharged prematurely if he remains too encephalopathic for therapy   - wife tells me that she "wants him home".      - we had prior discussions about hospice and what support and the care plan would look like.    - introduced the idea of a peritoneal drain today if comfort care and " "hospice is decided.    - Wife is hoping for a few more days in the hospital - she is clearly struggling with making and plans for the future.      - questions answered. Discussed with  PA and RN.    - will follow for support.     A total of 17 min was spent on advance care planning, goals of care discussion, emotional support, formulating and communicating prognosis and goals of care, exploring burden/benefit of various approaches of treatment.     6/21/23   - discussed hospice with patient at length.    - clarified notions that hospice is stopping everything and withdrawing fluid and food (she has a very poor understanding of hospice services).    - expressed that hospice focuses on comfort and well being while accepting the fact that there is a terminal illness that we can not cure.    - outlined the support available, aids, nurses, chaplains and SW while also requiring the family to be the primary caregiver.      - Matt has a neighbor that had her mother in hospice and I encouraged her to reach out to discuss the experience.      - Outside of new findings from EEG it appears that the pt. MS change stems from advanced liver disease. He may be hospice eligible if no new findings present themself.       - we agreed to await final recs from neurology and DR. Menendez to make any further plans for disposition.    - will follow up in AM.       6/20/23   - reviewed findings and expressed concerns that we may not be able to reverse the pt. Persistent and severe AMS.    - outlined that we hope that EEG and neuro eval will give additional treatment options, but also worry that his severe MS impairment may persist due to end stage liver disease - she agrees   - Wife tells me that she knew he was getting sicker over the past months and that her  also told her that he knew something was wrong...     - We discussed goals and limitations of care. Michelle tells me that they spoke about "what if" in the past and that " her  never wanted to live like a vegetable. He did not want to go on machines.    - We agreed that in this setting DNR would be most appropriate and reflect the pt. Wishes and also prevent potential suffering without benefit.      - We also agreed to await formal evaluation by neurology to then decide the next steps in care. Wife is aware about hospice services and is open to hearing more once all the evaluations are available.     - Goals of Care: await Neurology evaluation.    No machines or CPR - DNR    - Approach to treatment:     - DNR entered in the chart and d/w Dr. Menendez.     2: Symptom Management:     - Pain: does not appear to be in pain  - Dyspnea: no breathless  - Anxiety: no anxiety    3: ESLD     - ETOH related, ongoing abuse  - MELD 35>> 25 driven by bilirubin  - not felt a transplant candidate  - history of variceal bleed and portal HTN (banding in the past_  - ascites s/p paracentesis and empiric therapy for SBP    4: AMS:  - evaluation underway - EEG and Neuro Consult to rule out seizures  - concerns that this is persistent severe HE vs. Other encephalopathy      6: Summary and Recommendation:     - DNR   - Disposition to be determined. Hospice was discussed.       7: Follow up plans:    Will sign off    Thank you for your consult. Please call [phone] with questions.

## 2023-06-24 LAB
ALBUMIN SERPL BCP-MCNC: 3.2 G/DL (ref 3.5–5.2)
ALP SERPL-CCNC: 120 U/L (ref 55–135)
ALT SERPL W/O P-5'-P-CCNC: 44 U/L (ref 10–44)
ANION GAP SERPL CALC-SCNC: 6 MMOL/L (ref 8–16)
AST SERPL-CCNC: 67 U/L (ref 10–40)
BACTERIA BLD CULT: NORMAL
BILIRUB SERPL-MCNC: 11.9 MG/DL (ref 0.1–1)
BUN SERPL-MCNC: 5 MG/DL (ref 6–20)
CALCIUM SERPL-MCNC: 9.5 MG/DL (ref 8.7–10.5)
CHLORIDE SERPL-SCNC: 92 MMOL/L (ref 95–110)
CO2 SERPL-SCNC: 31 MMOL/L (ref 23–29)
CREAT SERPL-MCNC: 0.6 MG/DL (ref 0.5–1.4)
EST. GFR  (NO RACE VARIABLE): >60 ML/MIN/1.73 M^2
GLUCOSE SERPL-MCNC: 100 MG/DL (ref 70–110)
INR PPP: 2.1 (ref 0.8–1.2)
MAGNESIUM SERPL-MCNC: 1.6 MG/DL (ref 1.6–2.6)
POTASSIUM SERPL-SCNC: 4 MMOL/L (ref 3.5–5.1)
PROT SERPL-MCNC: 5.6 G/DL (ref 6–8.4)
PROTHROMBIN TIME: 21.4 SEC (ref 9–12.5)
SODIUM SERPL-SCNC: 129 MMOL/L (ref 136–145)

## 2023-06-24 PROCEDURE — 36415 COLL VENOUS BLD VENIPUNCTURE: CPT | Mod: NTX | Performed by: HOSPITALIST

## 2023-06-24 PROCEDURE — 99233 SBSQ HOSP IP/OBS HIGH 50: CPT | Mod: NTX,,, | Performed by: STUDENT IN AN ORGANIZED HEALTH CARE EDUCATION/TRAINING PROGRAM

## 2023-06-24 PROCEDURE — 83735 ASSAY OF MAGNESIUM: CPT | Mod: NTX | Performed by: STUDENT IN AN ORGANIZED HEALTH CARE EDUCATION/TRAINING PROGRAM

## 2023-06-24 PROCEDURE — 25000003 PHARM REV CODE 250: Mod: NTX | Performed by: HOSPITALIST

## 2023-06-24 PROCEDURE — 63600175 PHARM REV CODE 636 W HCPCS: Mod: JZ,JG,NTX | Performed by: STUDENT IN AN ORGANIZED HEALTH CARE EDUCATION/TRAINING PROGRAM

## 2023-06-24 PROCEDURE — 20600001 HC STEP DOWN PRIVATE ROOM: Mod: NTX

## 2023-06-24 PROCEDURE — C9113 INJ PANTOPRAZOLE SODIUM, VIA: HCPCS | Mod: NTX | Performed by: STUDENT IN AN ORGANIZED HEALTH CARE EDUCATION/TRAINING PROGRAM

## 2023-06-24 PROCEDURE — 85610 PROTHROMBIN TIME: CPT | Mod: NTX | Performed by: HOSPITALIST

## 2023-06-24 PROCEDURE — 25000003 PHARM REV CODE 250: Mod: NTX | Performed by: STUDENT IN AN ORGANIZED HEALTH CARE EDUCATION/TRAINING PROGRAM

## 2023-06-24 PROCEDURE — 99233 PR SUBSEQUENT HOSPITAL CARE,LEVL III: ICD-10-PCS | Mod: NTX,,, | Performed by: STUDENT IN AN ORGANIZED HEALTH CARE EDUCATION/TRAINING PROGRAM

## 2023-06-24 PROCEDURE — 80053 COMPREHEN METABOLIC PANEL: CPT | Mod: NTX | Performed by: HOSPITALIST

## 2023-06-24 PROCEDURE — P9047 ALBUMIN (HUMAN), 25%, 50ML: HCPCS | Mod: JZ,JG,NTX | Performed by: STUDENT IN AN ORGANIZED HEALTH CARE EDUCATION/TRAINING PROGRAM

## 2023-06-24 RX ORDER — ALBUMIN HUMAN 250 G/1000ML
25 SOLUTION INTRAVENOUS EVERY 12 HOURS
Status: COMPLETED | OUTPATIENT
Start: 2023-06-24 | End: 2023-06-24

## 2023-06-24 RX ADMIN — MIDODRINE HYDROCHLORIDE 15 MG: 5 TABLET ORAL at 08:06

## 2023-06-24 RX ADMIN — RIFAXIMIN 550 MG: 550 TABLET ORAL at 08:06

## 2023-06-24 RX ADMIN — THIAMINE HYDROCHLORIDE 250 MG: 100 INJECTION, SOLUTION INTRAMUSCULAR; INTRAVENOUS at 09:06

## 2023-06-24 RX ADMIN — CIPROFLOXACIN 500 MG: 250 TABLET, COATED ORAL at 08:06

## 2023-06-24 RX ADMIN — MIDODRINE HYDROCHLORIDE 15 MG: 5 TABLET ORAL at 05:06

## 2023-06-24 RX ADMIN — ALBUMIN (HUMAN) 25 G: 12.5 SOLUTION INTRAVENOUS at 08:06

## 2023-06-24 RX ADMIN — PANTOPRAZOLE SODIUM 40 MG: 40 INJECTION, POWDER, FOR SOLUTION INTRAVENOUS at 08:06

## 2023-06-24 RX ADMIN — MIDODRINE HYDROCHLORIDE 15 MG: 5 TABLET ORAL at 11:06

## 2023-06-24 RX ADMIN — LACTULOSE 200 G: 10 SOLUTION ORAL at 02:06

## 2023-06-24 NOTE — ASSESSMENT & PLAN NOTE
Alcoholic liver cirrhosis with ascites   Portal hypertension   Esophageal varices w/o bleeding   Hepatic encephalopathy   Coagulopathy   Thrombocytopenia     -admitted to [facility] ICU on 6/02 for presumed septic shock and hepatic encephalopathy (ammonia 102)  -treated with pressors, broad spectrum antibiotics and lactulose with some improvement of clinical status      MELD-Na: 28 at 6/24/2023  4:49 AM  MELD: 24 at 6/24/2023  4:49 AM  Calculated from:  Serum Creatinine: 0.6 mg/dL (Using min of 1 mg/dL) at 6/24/2023  4:49 AM  Serum Sodium: 129 mmol/L at 6/24/2023  4:49 AM  Total Bilirubin: 11.9 mg/dL at 6/24/2023  4:49 AM  INR(ratio): 2.1 at 6/24/2023  4:49 AM  No signs of active bleeding or overt sepsis. Paracentesis on 6/16 no SBP   -continue lactulose and rifaximin for hepatic encephalopathy (ammonia improved to 40)  - hold diuretics given hyponatremia  - TTE with EF of 70%  - PETH negative   - continue midodrine for borderline low BP   - continue protonix daily   - hepatology following  - on antibitoics as infection risk high and had episode hypothermia, treat empirically with vanc/zosyn for 5 days  - due to mental status no transplant evaluation opened yet  - palliative team following.

## 2023-06-24 NOTE — CARE UPDATE
RAPID RESPONSE NURSE ROUND       Rounding completed with charge RNDen for hypotension reports pt stable at this time. No additional concerns verbalized at this time. Instructed to call [phone] for further concerns or assistance.

## 2023-06-24 NOTE — PROGRESS NOTES
[facility] - Intensive Care ([address] Medicine  Progress Note    Patient Name: Cornelio Rivers  MRN: [id number]  Patient Class: IP- Inpatient   Admission Date: 6/15/2023  Length of Stay: 9 days  Attending Physician: Pascale Cotto MD  Primary Care Provider: Primary Doctor No        Subjective:     Principal Problem:Decompensated hepatic cirrhosis        HPI:  Cornelio Rivers is a 58-year-old male with a history of alcoholic cirrhosis diagnosed in 2020 complicated by portal hypertension, bleeding esophageal varices s/p banding in 2020, ascites, thrombocytopenia, coagulopathy, hepatic encephalopathy, SBP, alcohol use, and hyperlipidemia who presents as a transfer from [facility] for management of decompensated liver cirrhosis. Patient was admitted to [facility] on June 2 with altered mental status, increasing weakness, low blood pressure, poor appetite, and possible pneumonia.  He had ammonia level 102 and a MELD score 32 (sodium 118, INR 2.2, total bilirubin 16.3, creatinine 1.02). Chest x-ray on admission had mild interstitial edema versus pneumonitis with findings suggestive of developing airspace disease or atelectasis in the right chest.  Initially he had hypotension with concern for septic shock and was treated in the ICU with pressors.  He was treated with broad-spectrum antibiotics.  Hemoglobin decreased during his stay and he received packed red blood cells, but he did not have signs of GI bleeding.  INR increased during his stay and he received vitamin K/FFP.  He gradually weaned off pressors and was transferred out of the ICU on June 8.  Mental status has not improved, and he remains intermittently lethargic.  Bilirubin was stable to slightly improved, but INR has worsened.  He was empirically started on Rocephin for SBP prophylaxis (no paracentesis with elevated INR//blood cultures with no growth so far).  He has persistent abdominal distention.  Current medications  include Xifaxan and lactulose, Protonix, midodrine, ceftriaxone and Lasix/Aldactone.  Last alcohol use was noted to be about 2 months ago. Current MELD score 29.  Referring team spoke with Hepatology at [facility] with plan to transfer for further evaluation.   Current diagnoses include hepatic encephalopathy, alcoholic hepatitis, coagulopathy, and anemia.     June 13: Sodium 141, potassium 3.4, chloride 105 CO2 30, BUN 11, creatinine 0.51, glucose 103, total bilirubin 13.8, , ALT 75, INR 3, white blood cells 8.3, hemoglobin 8.4, hematocrit 25.5, platelets 89     June 12: Sodium 142, potassium 3, chloride 106, CO2 29, BUN 10, creatinine 0.59, glucose 125, calcium 9.5, magnesium 1.64, bilirubin 13.5, , ALT 78, white blood cells 7.4, hemoglobin 8.3, hematocrit 25.5, platelets 109, INR 2.9     June 9:  Right upper extremity Doppler venous ultrasound had no DVT  -chest x-ray had stable patchy bilateral pulmonary infiltrates.     June 2: COVID negative, influenza negative  -gallbladder ultrasound noted moderate volume ascites.  Thick-walled gallbladder seen, nonspecific.  Internal gallbladder sludge.  Chest x-ray had mild interstitial edema or pneumonitis with findings suggestive of developing airspace disease or atelectasis in the right chest.  -CT head had no evidence of acute intracranial hemorrhage.  Some microvascular disease is present.     February 16, 2023: EGD had no significant recurrence of varices in the esophagus.  Moderate portal hypertensive gastropathy with friable mucosa.    During my interview upon arrival to [facility], patient with waxing and waning mental status. He open eyes upon calling name, speaks few words but then falls back to sleep. He is oriented to person only as he was able to tell his name and his wife's name correctly who is present at bedside. He is able to follow simple commands like open his mouth upon asking. Wife states patient had a long history of alcohol use prior to  2020 when he developed acute esophageal variceal bleeding and diagnosed with alcoholic liver cirrhosis. He has been following with local GI doctor Dr. Gallegos in [place] for cirrhosis. His last hospital admission was in January of this year when he was admitted with SBP and had 5 liter paracentesis. He was discharged home on course of prednisone and ciprofloxacin at that time. Wife states patient cut down alcohol since 2020 and his last alcohol use about 2-3 months ago. Wife noticed overall declining about 2 weeks prior to this hospital admission as he was getting more weak, confused, lethargy, losing weight and muscle mass, decreased appetite. On June 2nd wife noticed his blood pressure to be low in 70s when she drove him to [facility]. Denies tobacco, IVDU or other illicit drug use. He worked as a .          Overview/Hospital Course:  For patient presented at outside hospital transfer for transplant evaluation.  Transplant evaluation currently not an option given patient's persistent encephalopathy and inability to conduct the interview.  Patient's persistent encephalopathy without unclear etiology.  Patient is maximized on his lactulose dosing, he has been on and continuous EEG that any identifiable is seizure activity.  EEG demonstrating slow and disorganized background with waxing and waning runs of triphasic waves consistent with a moderate-severe encephalopathy with evidence of cortical irritation diffusely.  Urine lack of improvement, palliative consulted.  Patient's wife states that she would like for him to return home with her if all options are exhausted.  She however still wants to continue treatment for various causes.      Interval History:   No acute events overnight.  Per wife, patient more active in the afternoon yesterday and into today.  Patient this morning able to track me across the room.  When asked to move his extremities showed intention however did not have great  "muscle coordination or movement.  Patient's wife reports that he is stated "I love you to her this morning.    Review of Systems   Unable to perform ROS: Mental status change (lethargy and confusion)   Objective:     Vital Signs (Most Recent):  Temp: 97.6 °F (36.4 °C) (06/24/23 0815)  Pulse: 97 (06/24/23 0815)  Resp: 19 (06/24/23 0815)  BP: (!) 93/51 (06/24/23 0815)  SpO2: 100 % (06/24/23 0815) Vital Signs (24h Range):  Temp:  [97 °F (36.1 °C)-98.5 °F (36.9 °C)] 97.6 °F (36.4 °C)  Pulse:  [79-97] 97  Resp:  [16-19] 19  SpO2:  [93 %-100 %] 100 %  BP: ()/(51-61) 93/51     Weight: 85.7 kg (188 lb 15 oz)  Body mass index is 27.11 kg/m².    Intake/Output Summary (Last 24 hours) at 6/24/2023 1053  Last data filed at 6/23/2023 1730  Gross per 24 hour   Intake 200 ml   Output 1600 ml   Net -1400 ml           Physical Exam  Constitutional:       General: He is not in acute distress.     Appearance: He is well-developed. He is ill-appearing. He is not diaphoretic.   HENT:      Head: Normocephalic and atraumatic.      Nose: Nose normal.      Mouth/Throat:      Pharynx: No oropharyngeal exudate.   Eyes:      General: No scleral icterus.     Conjunctiva/sclera: Conjunctivae normal.      Pupils: Pupils are equal, round, and reactive to light.   Neck:      Thyroid: No thyromegaly.      Vascular: No JVD.      Trachea: No tracheal deviation.   Cardiovascular:      Rate and Rhythm: Normal rate and regular rhythm.      Heart sounds: Normal heart sounds. No murmur heard.  Pulmonary:      Effort: Pulmonary effort is normal. No respiratory distress.      Breath sounds: Rhonchi present. No wheezing or rales.   Chest:      Chest wall: No tenderness.   Abdominal:      General: Bowel sounds are normal. There is distension (moderate distention with ascites).      Palpations: Abdomen is soft. There is no mass.      Tenderness: There is abdominal tenderness (mild diffuse TTP w/o guarding). There is no guarding or rebound.      Hernia: A " hernia (reducilble umbilical hernia) is present.   Musculoskeletal:         General: No tenderness.      Cervical back: Normal range of motion and neck supple.      Right lower leg: Edema present.      Left lower leg: Edema present.   Lymphadenopathy:      Cervical: No cervical adenopathy.   Skin:     General: Skin is warm and dry.      Coloration: Skin is jaundiced.      Findings: No erythema or rash.   Neurological:      Mental Status: He is alert.      Cranial Nerves: No cranial nerve deficit.      Motor: No abnormal muscle tone.      Coordination: Coordination normal.      Deep Tendon Reflexes: Reflexes are normal and symmetric. Reflexes normal.      Comments: Aox0, waxing and waning mental status. Limited neuro status due to inability to process commands/coordinate muscle movement. Withdraws to pain. Tracks minimally           Significant Labs: All pertinent labs within the past 24 hours have been reviewed.    Significant Imaging: I have reviewed all pertinent imaging results/findings within the past 24 hours.      Assessment/Plan:      * Decompensated hepatic cirrhosis  Alcoholic liver cirrhosis with ascites   Portal hypertension   Esophageal varices w/o bleeding   Hepatic encephalopathy   Coagulopathy   Thrombocytopenia     -admitted to [facility] ICU on 6/02 for presumed septic shock and hepatic encephalopathy (ammonia 102)  -treated with pressors, broad spectrum antibiotics and lactulose with some improvement of clinical status      MELD-Na: 28 at 6/24/2023  4:49 AM  MELD: 24 at 6/24/2023  4:49 AM  Calculated from:  Serum Creatinine: 0.6 mg/dL (Using min of 1 mg/dL) at 6/24/2023  4:49 AM  Serum Sodium: 129 mmol/L at 6/24/2023  4:49 AM  Total Bilirubin: 11.9 mg/dL at 6/24/2023  4:49 AM  INR(ratio): 2.1 at 6/24/2023  4:49 AM  No signs of active bleeding or overt sepsis. Paracentesis on 6/16 no SBP   -continue lactulose and rifaximin for hepatic encephalopathy (ammonia improved to 40)  - hold  diuretics given hyponatremia  - TTE with EF of 70%  - PETH negative   - continue midodrine for borderline low BP   - continue protonix daily   - hepatology following  - on antibitoics as infection risk high and had episode hypothermia, treat empirically with vanc/zosyn for 5 days  - due to mental status no transplant evaluation opened yet  - palliative team following.         Encephalopathy, metabolic  Waxing and waning mental status. Patient being treated for HE but still persistently encephalopathic. Neurology was consulted, patient placed on EEG. EEG showing slow and disorganized background with waxing and waning runs of triphasic waves consistent with a moderate-severe encephalopathy with evidence of cortical irritation diffusely which could be seen in liver disease  - Patient being treated for possible wernickes with IV thiamine  - MRI brain ordered 06/22/2023 which did not demonstrate any acute CVA however it did show there was some chronic changes in the basal ganglia which may be related to underlying liver disease however not particularly diagnostic  - on 06/24/2023 after switching to rectal lactulose patient more alert but still has myoclonic jerks when attempting to coordinate muscle movement  - pending further neurology recommendations      Hyponatremia  Acutely worsening over the past few days in setting of continued diuretic use.  Diuretics were held 6/21 however the sodium remained 125.  Could be contributing to patient's encephalopathy  - Started albumin 25 g q.12 on 6/22  - Na up to 129 on 6/24, continue albumin as above      Moderate malnutrition  Nutrition consulted. Most recent weight and BMI monitored-     Measurements:  Wt Readings from Last 1 Encounters:   06/22/23 85.7 kg (188 lb 15 oz)   Body mass index is 27.11 kg/m².    Patient has been screened and assessed by RD.    Malnutrition Type:  Context: social/environmental circumstances  Level: moderate    Malnutrition Characteristic  Summary:  Weight Loss (Malnutrition): 5% in 1 month  Energy Intake (Malnutrition): less than 75% for greater than or equal to 1 month    Interventions/Recommendations (treatment strategy):  1.    Palliative care encounter  Patient currently DNR.  If patient's mental status does not improve within the next few days, patient's wife would like for him to return home with her under hospice services       Physical debility  -due to liver cirrhosis and prolonged hospital stay for 2 weeks   -PT evaluation and treat       Hypomagnesemia  -likely from diuretic use   -will replace with MgSO4      Hypokalemia  - CTM daily, especially with diuretic usage      Coagulopathy  INR remains elevated at 2.2 despite vitamin K  - Due to decomp liver cirrhosis. No signs of bleeding   - received FFP and vitamin K at outside hospital   - s/p vitamin K dailyx 3 days  - monitor INR daily      Thrombocytopenia  Platelet 107 6/22  - due to portal hypertension and splenic sequestration   - monitor trend   - transfuse for hgb <10k or <50k with active bleed    Hepatic encephalopathy  Grade 3 HE. Ammonia improved with lactulose and rifaximin however patient still altered  - given lack of bowel movements, lactulose switched to rectal lactulose.  Patient had 3 good bowel movements on 06/23.  Will continue rectal lactulose today and transitioned to oral tomorrow  - continue rifaximin      Secondary esophageal varices without bleeding  Had initial episode of bleeding in 2020 treated with banding   - no further bleeding episode since then per wife   - follows with local GI. Dr. Gallegos   - last EGD in February 2023 showed no bleeding from varices, but had portal hypertensive gastropathy   - continue protonix daily       Portal hypertension  -see above under decomp cirrhosis       Alcoholic cirrhosis of liver with ascites  Cut down alcohol in 2020 when diagnosed with esophageal varices and cirrhosis   - last alcohol use 2-3 months ago per wife   - PETH  negative  - unable to have psychiatry assessment as mental status has yet to improve.           VTE Risk Mitigation (From admission, onward)         Ordered     IP VTE LOW RISK PATIENT  Once         06/15/23 0148     Place sequential compression device  Until discontinued         06/15/23 0148                Discharge Planning   NICKOLAS: 6/25/2023     Code Status: DNR   Is the patient medically ready for discharge?: No    Reason for patient still in hospital (select all that apply): Patient trending condition  Discharge Plan A: Skilled Nursing Facility   Discharge Delays: (!) Procedure Scheduling (IR, OR, Labs, Echo, Cath, Echo, EEG)              Pascale Cotto MD  Department of Hospital Medicine   [facility] - Intensive Care ([place]

## 2023-06-24 NOTE — NURSING
Alert this shift. Lactulose enema given and effective. No sign of distress. Family at bedside. Safety measures in place.

## 2023-06-24 NOTE — PLAN OF CARE
Arouses to pain. SR-ST on tele. Garbled speech. 3L NC. Bedfast. Ascites and incontinent. Jaundice. Condom cath in place. Lactulose enema given. 1 moderate size formed stool. Bedfast. Wife at bedside. Bed in lowest position and call light within reach. No acute events overnight.

## 2023-06-24 NOTE — ASSESSMENT & PLAN NOTE
Grade 3 HE. Ammonia improved with lactulose and rifaximin however patient still altered  - given lack of bowel movements, lactulose switched to rectal lactulose.  Patient had 3 good bowel movements on 06/23.  Will continue rectal lactulose today and transitioned to oral tomorrow  - continue rifaximin

## 2023-06-24 NOTE — ASSESSMENT & PLAN NOTE
Waxing and waning mental status. Patient being treated for HE but still persistently encephalopathic. Neurology was consulted, patient placed on EEG. EEG showing slow and disorganized background with waxing and waning runs of triphasic waves consistent with a moderate-severe encephalopathy with evidence of cortical irritation diffusely which could be seen in liver disease  - Patient being treated for possible wernickes with IV thiamine  - MRI brain ordered 06/22/2023 which did not demonstrate any acute CVA however it did show there was some chronic changes in the basal ganglia which may be related to underlying liver disease however not particularly diagnostic  - on 06/24/2023 after switching to rectal lactulose patient more alert but still has myoclonic jerks when attempting to coordinate muscle movement  - pending further neurology recommendations

## 2023-06-24 NOTE — SUBJECTIVE & OBJECTIVE
"Interval History:   No acute events overnight.  Per wife, patient more active in the afternoon yesterday and into today.  Patient this morning able to track me across the room.  When asked to move his extremities showed intention however did not have great muscle coordination or movement.  Patient's wife reports that he is stated "I love you to her this morning.    Review of Systems   Unable to perform ROS: Mental status change (lethargy and confusion)   Objective:     Vital Signs (Most Recent):  Temp: 97.6 °F (36.4 °C) (06/24/23 0815)  Pulse: 97 (06/24/23 0815)  Resp: 19 (06/24/23 0815)  BP: (!) 93/51 (06/24/23 0815)  SpO2: 100 % (06/24/23 0815) Vital Signs (24h Range):  Temp:  [97 °F (36.1 °C)-98.5 °F (36.9 °C)] 97.6 °F (36.4 °C)  Pulse:  [79-97] 97  Resp:  [16-19] 19  SpO2:  [93 %-100 %] 100 %  BP: ()/(51-61) 93/51     Weight: 85.7 kg (188 lb 15 oz)  Body mass index is 27.11 kg/m².    Intake/Output Summary (Last 24 hours) at 6/24/2023 1053  Last data filed at 6/23/2023 1730  Gross per 24 hour   Intake 200 ml   Output 1600 ml   Net -1400 ml           Physical Exam  Constitutional:       General: He is not in acute distress.     Appearance: He is well-developed. He is ill-appearing. He is not diaphoretic.   HENT:      Head: Normocephalic and atraumatic.      Nose: Nose normal.      Mouth/Throat:      Pharynx: No oropharyngeal exudate.   Eyes:      General: No scleral icterus.     Conjunctiva/sclera: Conjunctivae normal.      Pupils: Pupils are equal, round, and reactive to light.   Neck:      Thyroid: No thyromegaly.      Vascular: No JVD.      Trachea: No tracheal deviation.   Cardiovascular:      Rate and Rhythm: Normal rate and regular rhythm.      Heart sounds: Normal heart sounds. No murmur heard.  Pulmonary:      Effort: Pulmonary effort is normal. No respiratory distress.      Breath sounds: Rhonchi present. No wheezing or rales.   Chest:      Chest wall: No tenderness.   Abdominal:      General: " Bowel sounds are normal. There is distension (moderate distention with ascites).      Palpations: Abdomen is soft. There is no mass.      Tenderness: There is abdominal tenderness (mild diffuse TTP w/o guarding). There is no guarding or rebound.      Hernia: A hernia (reducilble umbilical hernia) is present.   Musculoskeletal:         General: No tenderness.      Cervical back: Normal range of motion and neck supple.      Right lower leg: Edema present.      Left lower leg: Edema present.   Lymphadenopathy:      Cervical: No cervical adenopathy.   Skin:     General: Skin is warm and dry.      Coloration: Skin is jaundiced.      Findings: No erythema or rash.   Neurological:      Mental Status: He is alert.      Cranial Nerves: No cranial nerve deficit.      Motor: No abnormal muscle tone.      Coordination: Coordination normal.      Deep Tendon Reflexes: Reflexes are normal and symmetric. Reflexes normal.      Comments: Aox0, waxing and waning mental status. Limited neuro status due to inability to process commands/coordinate muscle movement. Withdraws to pain. Tracks minimally           Significant Labs: All pertinent labs within the past 24 hours have been reviewed.    Significant Imaging: I have reviewed all pertinent imaging results/findings within the past 24 hours.

## 2023-06-25 PROBLEM — G93.41 ENCEPHALOPATHY, METABOLIC: Status: RESOLVED | Noted: 2023-06-15 | Resolved: 2023-06-25

## 2023-06-25 PROBLEM — K72.91 HEPATIC COMA/ENCEPHALOPATHY: Status: ACTIVE | Noted: 2023-06-15

## 2023-06-25 PROBLEM — K72.91 HEPATIC COMA/ENCEPHALOPATHY: Status: RESOLVED | Noted: 2023-06-15 | Resolved: 2023-06-25

## 2023-06-25 LAB
ALBUMIN SERPL BCP-MCNC: 3.5 G/DL (ref 3.5–5.2)
ALP SERPL-CCNC: 111 U/L (ref 55–135)
ALT SERPL W/O P-5'-P-CCNC: 39 U/L (ref 10–44)
ANION GAP SERPL CALC-SCNC: 8 MMOL/L (ref 8–16)
AST SERPL-CCNC: 59 U/L (ref 10–40)
BILIRUB SERPL-MCNC: 12 MG/DL (ref 0.1–1)
BUN SERPL-MCNC: 6 MG/DL (ref 6–20)
CALCIUM SERPL-MCNC: 9.5 MG/DL (ref 8.7–10.5)
CHLORIDE SERPL-SCNC: 89 MMOL/L (ref 95–110)
CO2 SERPL-SCNC: 32 MMOL/L (ref 23–29)
CREAT SERPL-MCNC: 0.6 MG/DL (ref 0.5–1.4)
EST. GFR  (NO RACE VARIABLE): >60 ML/MIN/1.73 M^2
GLUCOSE SERPL-MCNC: 101 MG/DL (ref 70–110)
INR PPP: 2.2 (ref 0.8–1.2)
MAGNESIUM SERPL-MCNC: 1.6 MG/DL (ref 1.6–2.6)
POTASSIUM SERPL-SCNC: 3.6 MMOL/L (ref 3.5–5.1)
PROT SERPL-MCNC: 5.8 G/DL (ref 6–8.4)
PROTHROMBIN TIME: 22.1 SEC (ref 9–12.5)
SODIUM SERPL-SCNC: 129 MMOL/L (ref 136–145)

## 2023-06-25 PROCEDURE — C9113 INJ PANTOPRAZOLE SODIUM, VIA: HCPCS | Mod: NTX | Performed by: STUDENT IN AN ORGANIZED HEALTH CARE EDUCATION/TRAINING PROGRAM

## 2023-06-25 PROCEDURE — 25000003 PHARM REV CODE 250: Mod: NTX | Performed by: STUDENT IN AN ORGANIZED HEALTH CARE EDUCATION/TRAINING PROGRAM

## 2023-06-25 PROCEDURE — 25000003 PHARM REV CODE 250: Mod: NTX | Performed by: HOSPITALIST

## 2023-06-25 PROCEDURE — 99233 PR SUBSEQUENT HOSPITAL CARE,LEVL III: ICD-10-PCS | Mod: NTX,,, | Performed by: STUDENT IN AN ORGANIZED HEALTH CARE EDUCATION/TRAINING PROGRAM

## 2023-06-25 PROCEDURE — 99900035 HC TECH TIME PER 15 MIN (STAT): Mod: NTX

## 2023-06-25 PROCEDURE — 99233 SBSQ HOSP IP/OBS HIGH 50: CPT | Mod: NTX,,, | Performed by: STUDENT IN AN ORGANIZED HEALTH CARE EDUCATION/TRAINING PROGRAM

## 2023-06-25 PROCEDURE — 63600175 PHARM REV CODE 636 W HCPCS: Mod: NTX | Performed by: STUDENT IN AN ORGANIZED HEALTH CARE EDUCATION/TRAINING PROGRAM

## 2023-06-25 PROCEDURE — 27000221 HC OXYGEN, UP TO 24 HOURS: Mod: NTX

## 2023-06-25 PROCEDURE — 94761 N-INVAS EAR/PLS OXIMETRY MLT: CPT | Mod: NTX

## 2023-06-25 PROCEDURE — 80053 COMPREHEN METABOLIC PANEL: CPT | Mod: NTX | Performed by: HOSPITALIST

## 2023-06-25 PROCEDURE — 20600001 HC STEP DOWN PRIVATE ROOM: Mod: NTX

## 2023-06-25 PROCEDURE — 85610 PROTHROMBIN TIME: CPT | Mod: NTX | Performed by: HOSPITALIST

## 2023-06-25 PROCEDURE — 36415 COLL VENOUS BLD VENIPUNCTURE: CPT | Mod: NTX | Performed by: HOSPITALIST

## 2023-06-25 PROCEDURE — 83735 ASSAY OF MAGNESIUM: CPT | Mod: NTX | Performed by: STUDENT IN AN ORGANIZED HEALTH CARE EDUCATION/TRAINING PROGRAM

## 2023-06-25 RX ORDER — LACTULOSE 10 G/15ML
30 SOLUTION ORAL 3 TIMES DAILY
Status: DISCONTINUED | OUTPATIENT
Start: 2023-06-25 | End: 2023-06-27

## 2023-06-25 RX ORDER — PANTOPRAZOLE SODIUM 40 MG/1
40 TABLET, DELAYED RELEASE ORAL DAILY
Status: DISCONTINUED | OUTPATIENT
Start: 2023-06-26 | End: 2023-06-26

## 2023-06-25 RX ORDER — BISACODYL 10 MG
10 SUPPOSITORY, RECTAL RECTAL DAILY
Status: DISCONTINUED | OUTPATIENT
Start: 2023-06-25 | End: 2023-06-28 | Stop reason: HOSPADM

## 2023-06-25 RX ADMIN — PANTOPRAZOLE SODIUM 40 MG: 40 INJECTION, POWDER, FOR SOLUTION INTRAVENOUS at 09:06

## 2023-06-25 RX ADMIN — LACTULOSE 30 G: 20 SOLUTION ORAL at 05:06

## 2023-06-25 RX ADMIN — CIPROFLOXACIN 500 MG: 250 TABLET, COATED ORAL at 09:06

## 2023-06-25 RX ADMIN — MIDODRINE HYDROCHLORIDE 15 MG: 5 TABLET ORAL at 05:06

## 2023-06-25 RX ADMIN — MIDODRINE HYDROCHLORIDE 15 MG: 5 TABLET ORAL at 02:06

## 2023-06-25 RX ADMIN — THIAMINE HYDROCHLORIDE 250 MG: 100 INJECTION, SOLUTION INTRAMUSCULAR; INTRAVENOUS at 09:06

## 2023-06-25 RX ADMIN — MIDODRINE HYDROCHLORIDE 15 MG: 5 TABLET ORAL at 09:06

## 2023-06-25 RX ADMIN — RIFAXIMIN 550 MG: 550 TABLET ORAL at 09:06

## 2023-06-25 RX ADMIN — LACTULOSE 30 G: 20 SOLUTION ORAL at 09:06

## 2023-06-25 RX ADMIN — BISACODYL 10 MG: 10 SUPPOSITORY RECTAL at 05:06

## 2023-06-25 RX ADMIN — LACTULOSE 200 G: 10 SOLUTION ORAL at 11:06

## 2023-06-25 NOTE — PROGRESS NOTES
[facility] - Int[facility] ([address] Medicine  Progress Note    Patient Name: Cornelio Rivers  MRN: [id number]  Patient Class: IP- Inpatient   Admission Date: 6/15/2023  Length of Stay: 10 days  Attending Physician: Pascale Cotto MD  Primary Care Provider: Primary Doctor No        Subjective:     Principal Problem:Decompensated hepatic cirrhosis        HPI:  Cornelio Rivers is a 58-year-old male with a history of alcoholic cirrhosis diagnosed in 2020 complicated by portal hypertension, bleeding esophageal varices s/p banding in 2020, ascites, thrombocytopenia, coagulopathy, hepatic encephalopathy, SBP, alcohol use, and hyperlipidemia who presents as a transfer from [facility] for management of decompensated liver cirrhosis. Patient was admitted to [facility] on June 2 with altered mental status, increasing weakness, low blood pressure, poor appetite, and possible pneumonia.  He had ammonia level 102 and a MELD score 32 (sodium 118, INR 2.2, total bilirubin 16.3, creatinine 1.02). Chest x-ray on admission had mild interstitial edema versus pneumonitis with findings suggestive of developing airspace disease or atelectasis in the right chest.  Initially he had hypotension with concern for septic shock and was treated in the ICU with pressors.  He was treated with broad-spectrum antibiotics.  Hemoglobin decreased during his stay and he received packed red blood cells, but he did not have signs of GI bleeding.  INR increased during his stay and he received vitamin K/FFP.  He gradually weaned off pressors and was transferred out of the ICU on June 8.  Mental status has not improved, and he remains intermittently lethargic.  Bilirubin was stable to slightly improved, but INR has worsened.  He was empirically started on Rocephin for SBP prophylaxis (no paracentesis with elevated INR//blood cultures with no growth so far).  He has persistent abdominal distention.  Current medications  include Xifaxan and lactulose, Protonix, midodrine, ceftriaxone and Lasix/Aldactone.  Last alcohol use was noted to be about 2 months ago. Current MELD score 29.  Referring team spoke with Hepatology at [facility] with plan to transfer for further evaluation.   Current diagnoses include hepatic encephalopathy, alcoholic hepatitis, coagulopathy, and anemia.     June 13: Sodium 141, potassium 3.4, chloride 105 CO2 30, BUN 11, creatinine 0.51, glucose 103, total bilirubin 13.8, , ALT 75, INR 3, white blood cells 8.3, hemoglobin 8.4, hematocrit 25.5, platelets 89     June 12: Sodium 142, potassium 3, chloride 106, CO2 29, BUN 10, creatinine 0.59, glucose 125, calcium 9.5, magnesium 1.64, bilirubin 13.5, , ALT 78, white blood cells 7.4, hemoglobin 8.3, hematocrit 25.5, platelets 109, INR 2.9     June 9:  Right upper extremity Doppler venous ultrasound had no DVT  -chest x-ray had stable patchy bilateral pulmonary infiltrates.     June 2: COVID negative, influenza negative  -gallbladder ultrasound noted moderate volume ascites.  Thick-walled gallbladder seen, nonspecific.  Internal gallbladder sludge.  Chest x-ray had mild interstitial edema or pneumonitis with findings suggestive of developing airspace disease or atelectasis in the right chest.  -CT head had no evidence of acute intracranial hemorrhage.  Some microvascular disease is present.     February 16, 2023: EGD had no significant recurrence of varices in the esophagus.  Moderate portal hypertensive gastropathy with friable mucosa.    During my interview upon arrival to [facility], patient with waxing and waning mental status. He open eyes upon calling name, speaks few words but then falls back to sleep. He is oriented to person only as he was able to tell his name and his wife's name correctly who is present at bedside. He is able to follow simple commands like open his mouth upon asking. Wife states patient had a long history of alcohol use prior to  2020 when he developed acute esophageal variceal bleeding and diagnosed with alcoholic liver cirrhosis. He has been following with local GI doctor Dr. Gallegos in [place] for cirrhosis. His last hospital admission was in January of this year when he was admitted with SBP and had 5 liter paracentesis. He was discharged home on course of prednisone and ciprofloxacin at that time. Wife states patient cut down alcohol since 2020 and his last alcohol use about 2-3 months ago. Wife noticed overall declining about 2 weeks prior to this hospital admission as he was getting more weak, confused, lethargy, losing weight and muscle mass, decreased appetite. On June 2nd wife noticed his blood pressure to be low in 70s when she drove him to [facility]. Denies tobacco, IVDU or other illicit drug use. He worked as a .          Overview/Hospital Course:  For patient presented at outside hospital transfer for transplant evaluation.  Transplant evaluation currently not an option given patient's persistent encephalopathy and inability to conduct the interview.  Patient's persistent encephalopathy without unclear etiology.  Patient is maximized on his lactulose dosing, he has been on and continuous EEG that any identifiable is seizure activity.  EEG demonstrating slow and disorganized background with waxing and waning runs of triphasic waves consistent with a moderate-severe encephalopathy with evidence of cortical irritation diffusely.  Urine lack of improvement, palliative consulted.  Patient's wife states that she would like for him to return home with her if all options are exhausted.  She however still wants to continue treatment for various causes.      Interval History:   No acute events overnight.  Per wife, patient intermittently able to say some words but with soft tone. Patient awakened eyes to my voice. We discussed KUB findings that were notable for gaseous distension throughout colon and small bowel  up to stomach and that patient's lack of bowel movements are contributing to it.     Review of Systems   Unable to perform ROS: Mental status change (lethargy and confusion)   Objective:     Vital Signs (Most Recent):  Temp: 98.6 °F (37 °C) (06/25/23 0743)  Pulse: 97 (06/25/23 0750)  Resp: 16 (06/25/23 0743)  BP: (!) 98/59 (06/25/23 0743)  SpO2: 98 % (06/25/23 0743) Vital Signs (24h Range):  Temp:  [98 °F (36.7 °C)-98.6 °F (37 °C)] 98.6 °F (37 °C)  Pulse:  [] 97  Resp:  [16-19] 16  SpO2:  [95 %-100 %] 98 %  BP: ()/(53-62) 98/59     Weight: 85.7 kg (188 lb 15 oz)  Body mass index is 27.11 kg/m².    Intake/Output Summary (Last 24 hours) at 6/25/2023 1132  Last data filed at 6/24/2023 2014  Gross per 24 hour   Intake 200 ml   Output 375 ml   Net -175 ml           Physical Exam  Constitutional:       General: He is not in acute distress.     Appearance: He is well-developed. He is ill-appearing. He is not diaphoretic.   HENT:      Head: Normocephalic and atraumatic.      Nose: Nose normal.      Mouth/Throat:      Pharynx: No oropharyngeal exudate.   Eyes:      General: No scleral icterus.     Conjunctiva/sclera: Conjunctivae normal.      Pupils: Pupils are equal, round, and reactive to light.   Neck:      Thyroid: No thyromegaly.      Vascular: No JVD.      Trachea: No tracheal deviation.   Cardiovascular:      Rate and Rhythm: Normal rate and regular rhythm.      Heart sounds: Normal heart sounds. No murmur heard.  Pulmonary:      Effort: Pulmonary effort is normal. No respiratory distress.      Breath sounds: Rhonchi present. No wheezing or rales.   Chest:      Chest wall: No tenderness.   Abdominal:      General: Bowel sounds are normal. There is distension (gaseous distension).      Palpations: Abdomen is soft. There is no mass.      Tenderness: There is abdominal tenderness. There is guarding. There is no rebound.      Hernia: A hernia (reducilble umbilical hernia) is present.   Musculoskeletal:          General: No tenderness.      Cervical back: Normal range of motion and neck supple.      Right lower leg: Edema present.      Left lower leg: Edema present.   Lymphadenopathy:      Cervical: No cervical adenopathy.   Skin:     General: Skin is warm and dry.      Coloration: Skin is jaundiced.      Findings: No erythema or rash.   Neurological:      Mental Status: He is alert.      Cranial Nerves: No cranial nerve deficit.      Motor: No abnormal muscle tone.      Coordination: Coordination normal.      Deep Tendon Reflexes: Reflexes are normal and symmetric. Reflexes normal.      Comments: Aox0, waxing and waning mental status. Tracks briefly across room, able to verbalize some words. Eating with assistance of wife.            Significant Labs: All pertinent labs within the past 24 hours have been reviewed.    Significant Imaging: I have reviewed all pertinent imaging results/findings within the past 24 hours.      Assessment/Plan:      * Decompensated hepatic cirrhosis  Alcoholic liver cirrhosis with ascites   Portal hypertension   Esophageal varices w/o bleeding   Hepatic encephalopathy   Coagulopathy   Thrombocytopenia     -admitted to [facility] ICU on 6/02 for presumed septic shock and hepatic encephalopathy (ammonia 102)  -treated with pressors, broad spectrum antibiotics and lactulose with some improvement of clinical status      MELD-Na: 29 at 6/25/2023  7:11 AM  MELD: 25 at 6/25/2023  7:11 AM  Calculated from:  Serum Creatinine: 0.6 mg/dL (Using min of 1 mg/dL) at 6/25/2023  7:11 AM  Serum Sodium: 129 mmol/L at 6/25/2023  7:11 AM  Total Bilirubin: 12.0 mg/dL at 6/25/2023  7:11 AM  INR(ratio): 2.2 at 6/25/2023  7:11 AM  No signs of active bleeding or overt sepsis. Paracentesis on 6/16 no SBP   - continue lactulose and rifaximin for hepatic encephalopathy  - hold diuretics given hyponatremia  - TTE with EF of 70%  - PETH negative   - continue midodrine for borderline low BP   - continue  protonix daily   - hepatology following  - on antibitoics as infection risk high and had episode hypothermia, treat empirically with vanc/zosyn for 5 days  - due to mental status no transplant evaluation opened yet  - palliative team following.         Encephalopathy, metabolic  Waxing and waning mental status. Patient being treated for HE but still persistently encephalopathic. Neurology was consulted, patient placed on EEG. EEG showing slow and disorganized background with waxing and waning runs of triphasic waves consistent with a moderate-severe encephalopathy with evidence of cortical irritation diffusely which could be seen in liver disease  - Patient being treated for possible wernickes with IV thiamine  - MRI brain ordered 06/22/2023 which did not demonstrate any acute CVA however it did show there was some chronic changes in the basal ganglia which may be related to underlying liver disease however not particularly diagnostic  - on 06/24/2023 after switching to rectal lactulose patient more alert but still has myoclonic jerks when attempting to coordinate muscle movement. Gaseous distension noted on abdominal imaging which may be preventing further ammonia clearance as he has not had consistent BMs  - 06/25 switched back to oral lactulose, repeat KUB ordered    Hepatic encephalopathy  Grade 3 HE. Ammonia improved with lactulose and rifaximin however patient still altered  - given lack of bowel movements, lactulose switched to rectal lactulose.  Patient had 3 good bowel movements on 06/23.  Will continue rectal lactulose today and transitioned to oral tomorrow  - continue rifaximin      Hyponatremia  Acutely worsening over the past few days in setting of continued diuretic use.  Diuretics were held 6/21 however the sodium remained 125.  Could be contributing to patient's encephalopathy  - Started albumin 25 g q.12 on 6/22  - Na up to 129 after 3 days of albumin, hold on further albumin for now      Moderate  malnutrition  Nutrition consulted. Most recent weight and BMI monitored-     Measurements:  Wt Readings from Last 1 Encounters:   06/22/23 85.7 kg (188 lb 15 oz)   Body mass index is 27.11 kg/m².    Patient has been screened and assessed by RD.    Malnutrition Type:  Context: social/environmental circumstances  Level: moderate    Malnutrition Characteristic Summary:  Weight Loss (Malnutrition): 5% in 1 month  Energy Intake (Malnutrition): less than 75% for greater than or equal to 1 month    Interventions/Recommendations (treatment strategy):  1.    Palliative care encounter  Patient currently DNR.  If patient's mental status does not improve within the next few days, patient's wife would like for him to return home with her under hospice services       Physical debility  -due to liver cirrhosis and prolonged hospital stay for 2 weeks   -PT evaluation and treat       Hypomagnesemia  -likely from diuretic use   -will replace with MgSO4      Hypokalemia  - CTM daily, especially with diuretic usage      Coagulopathy  INR remains elevated at 2.2 despite vitamin K  - Due to decomp liver cirrhosis. No signs of bleeding   - received FFP and vitamin K at outside hospital   - s/p vitamin K dailyx 3 days  - monitor INR daily      Thrombocytopenia  Platelet 107 6/22  - due to portal hypertension and splenic sequestration   - monitor trend   - transfuse for hgb <10k or <50k with active bleed    Secondary esophageal varices without bleeding  Had initial episode of bleeding in 2020 treated with banding   - no further bleeding episode since then per wife   - follows with local GI. Dr. Gallegos   - last EGD in February 2023 showed no bleeding from varices, but had portal hypertensive gastropathy   - continue protonix daily       Portal hypertension  -see above under decomp cirrhosis       Alcoholic cirrhosis of liver with ascites  MELD-Na: 29 at 6/25/2023  7:11 AM  MELD: 25 at 6/25/2023  7:11 AM  Calculated from:  Serum Creatinine:  0.6 mg/dL (Using min of 1 mg/dL) at 6/25/2023  7:11 AM  Serum Sodium: 129 mmol/L at 6/25/2023  7:11 AM  Total Bilirubin: 12.0 mg/dL at 6/25/2023  7:11 AM  INR(ratio): 2.2 at 6/25/2023  7:11 AM  Cut down alcohol in 2020 when diagnosed with esophageal varices and cirrhosis   - last alcohol use 2-3 months ago per wife   - PETH negative  - unable to have psychiatry assessment as mental status has yet to improve.           VTE Risk Mitigation (From admission, onward)         Ordered     IP VTE LOW RISK PATIENT  Once         06/15/23 0148     Place sequential compression device  Until discontinued         06/15/23 0148                Discharge Planning   NICKOLAS: 6/25/2023     Code Status: DNR   Is the patient medically ready for discharge?: No    Reason for patient still in hospital (select all that apply): Patient trending condition  Discharge Plan A: Skilled Nursing Facility   Discharge Delays: (!) Procedure Scheduling (IR, OR, Labs, Echo, Cath, Echo, EEG)              Pascale Cotto MD  Department of Hospital Medicine   [facility] - Intensive Care ([place]-[facility])

## 2023-06-25 NOTE — ASSESSMENT & PLAN NOTE
Acutely worsening over the past few days in setting of continued diuretic use.  Diuretics were held 6/21 however the sodium remained 125.  Could be contributing to patient's encephalopathy  - Started albumin 25 g q.12 on 6/22  - Na up to 129 after 3 days of albumin, hold on further albumin for now

## 2023-06-25 NOTE — PLAN OF CARE
Pt disoriented x4. Arouses to pain. 3L NC. Incontinent condom cath and brief in place. Swallows pills crushed in ice cream. Wife at bedside. Bed in lowest position and call light within reach. No acute events overnight.

## 2023-06-25 NOTE — ASSESSMENT & PLAN NOTE
Alcoholic liver cirrhosis with ascites   Portal hypertension   Esophageal varices w/o bleeding   Hepatic encephalopathy   Coagulopathy   Thrombocytopenia     -admitted to [facility] ICU on 6/02 for presumed septic shock and hepatic encephalopathy (ammonia 102)  -treated with pressors, broad spectrum antibiotics and lactulose with some improvement of clinical status      MELD-Na: 29 at 6/25/2023  7:11 AM  MELD: 25 at 6/25/2023  7:11 AM  Calculated from:  Serum Creatinine: 0.6 mg/dL (Using min of 1 mg/dL) at 6/25/2023  7:11 AM  Serum Sodium: 129 mmol/L at 6/25/2023  7:11 AM  Total Bilirubin: 12.0 mg/dL at 6/25/2023  7:11 AM  INR(ratio): 2.2 at 6/25/2023  7:11 AM  No signs of active bleeding or overt sepsis. Paracentesis on 6/16 no SBP   - continue lactulose and rifaximin for hepatic encephalopathy  - hold diuretics given hyponatremia  - TTE with EF of 70%  - PETH negative   - continue midodrine for borderline low BP   - continue protonix daily   - hepatology following  - on antibitoics as infection risk high and had episode hypothermia, treat empirically with vanc/zosyn for 5 days  - due to mental status no transplant evaluation opened yet  - palliative team following.

## 2023-06-25 NOTE — ASSESSMENT & PLAN NOTE
MELD-Na: 29 at 6/25/2023  7:11 AM  MELD: 25 at 6/25/2023  7:11 AM  Calculated from:  Serum Creatinine: 0.6 mg/dL (Using min of 1 mg/dL) at 6/25/2023  7:11 AM  Serum Sodium: 129 mmol/L at 6/25/2023  7:11 AM  Total Bilirubin: 12.0 mg/dL at 6/25/2023  7:11 AM  INR(ratio): 2.2 at 6/25/2023  7:11 AM  Cut down alcohol in 2020 when diagnosed with esophageal varices and cirrhosis   - last alcohol use 2-3 months ago per wife   - PETH negative  - unable to have psychiatry assessment as mental status has yet to improve.

## 2023-06-25 NOTE — SUBJECTIVE & OBJECTIVE
Interval History:   No acute events overnight.  Per wife, patient intermittently able to say some words but with soft tone. Patient awakened eyes to my voice. We discussed KUB findings that were notable for gaseous distension throughout colon and small bowel up to stomach and that patient's lack of bowel movements are contributing to it.     Review of Systems   Unable to perform ROS: Mental status change (lethargy and confusion)   Objective:     Vital Signs (Most Recent):  Temp: 98.6 °F (37 °C) (06/25/23 0743)  Pulse: 97 (06/25/23 0750)  Resp: 16 (06/25/23 0743)  BP: (!) 98/59 (06/25/23 0743)  SpO2: 98 % (06/25/23 0743) Vital Signs (24h Range):  Temp:  [98 °F (36.7 °C)-98.6 °F (37 °C)] 98.6 °F (37 °C)  Pulse:  [] 97  Resp:  [16-19] 16  SpO2:  [95 %-100 %] 98 %  BP: ()/(53-62) 98/59     Weight: 85.7 kg (188 lb 15 oz)  Body mass index is 27.11 kg/m².    Intake/Output Summary (Last 24 hours) at 6/25/2023 1132  Last data filed at 6/24/2023 2014  Gross per 24 hour   Intake 200 ml   Output 375 ml   Net -175 ml           Physical Exam  Constitutional:       General: He is not in acute distress.     Appearance: He is well-developed. He is ill-appearing. He is not diaphoretic.   HENT:      Head: Normocephalic and atraumatic.      Nose: Nose normal.      Mouth/Throat:      Pharynx: No oropharyngeal exudate.   Eyes:      General: No scleral icterus.     Conjunctiva/sclera: Conjunctivae normal.      Pupils: Pupils are equal, round, and reactive to light.   Neck:      Thyroid: No thyromegaly.      Vascular: No JVD.      Trachea: No tracheal deviation.   Cardiovascular:      Rate and Rhythm: Normal rate and regular rhythm.      Heart sounds: Normal heart sounds. No murmur heard.  Pulmonary:      Effort: Pulmonary effort is normal. No respiratory distress.      Breath sounds: Rhonchi present. No wheezing or rales.   Chest:      Chest wall: No tenderness.   Abdominal:      General: Bowel sounds are normal. There is  distension (gaseous distension).      Palpations: Abdomen is soft. There is no mass.      Tenderness: There is abdominal tenderness. There is guarding. There is no rebound.      Hernia: A hernia (reducilble umbilical hernia) is present.   Musculoskeletal:         General: No tenderness.      Cervical back: Normal range of motion and neck supple.      Right lower leg: Edema present.      Left lower leg: Edema present.   Lymphadenopathy:      Cervical: No cervical adenopathy.   Skin:     General: Skin is warm and dry.      Coloration: Skin is jaundiced.      Findings: No erythema or rash.   Neurological:      Mental Status: He is alert.      Cranial Nerves: No cranial nerve deficit.      Motor: No abnormal muscle tone.      Coordination: Coordination normal.      Deep Tendon Reflexes: Reflexes are normal and symmetric. Reflexes normal.      Comments: Aox0, waxing and waning mental status. Tracks briefly across room, able to verbalize some words. Eating with assistance of wife.            Significant Labs: All pertinent labs within the past 24 hours have been reviewed.    Significant Imaging: I have reviewed all pertinent imaging results/findings within the past 24 hours.

## 2023-06-25 NOTE — ASSESSMENT & PLAN NOTE
Waxing and waning mental status. Patient being treated for HE but still persistently encephalopathic. Neurology was consulted, patient placed on EEG. EEG showing slow and disorganized background with waxing and waning runs of triphasic waves consistent with a moderate-severe encephalopathy with evidence of cortical irritation diffusely which could be seen in liver disease  - Patient being treated for possible wernickes with IV thiamine  - MRI brain ordered 06/22/2023 which did not demonstrate any acute CVA however it did show there was some chronic changes in the basal ganglia which may be related to underlying liver disease however not particularly diagnostic  - on 06/24/2023 after switching to rectal lactulose patient more alert but still has myoclonic jerks when attempting to coordinate muscle movement. Gaseous distension noted on abdominal imaging which may be preventing further ammonia clearance as he has not had consistent BMs  - 06/25 switched back to oral lactulose, repeat KUB ordered

## 2023-06-26 LAB
ALBUMIN SERPL BCP-MCNC: 3.5 G/DL (ref 3.5–5.2)
ALP SERPL-CCNC: 104 U/L (ref 55–135)
ALT SERPL W/O P-5'-P-CCNC: 37 U/L (ref 10–44)
ANION GAP SERPL CALC-SCNC: 10 MMOL/L (ref 8–16)
AST SERPL-CCNC: 60 U/L (ref 10–40)
BACTERIA SPEC ANAEROBE CULT: NORMAL
BILIRUB SERPL-MCNC: 12.4 MG/DL (ref 0.1–1)
BUN SERPL-MCNC: 7 MG/DL (ref 6–20)
CALCIUM SERPL-MCNC: 9.6 MG/DL (ref 8.7–10.5)
CHLORIDE SERPL-SCNC: 91 MMOL/L (ref 95–110)
CO2 SERPL-SCNC: 30 MMOL/L (ref 23–29)
CREAT SERPL-MCNC: 0.6 MG/DL (ref 0.5–1.4)
ERYTHROCYTE [DISTWIDTH] IN BLOOD BY AUTOMATED COUNT: 22.2 % (ref 11.5–14.5)
EST. GFR  (NO RACE VARIABLE): >60 ML/MIN/1.73 M^2
GLUCOSE SERPL-MCNC: 113 MG/DL (ref 70–110)
HCT VFR BLD AUTO: 20.8 % (ref 40–54)
HGB BLD-MCNC: 7 G/DL (ref 14–18)
INR PPP: 2.2 (ref 0.8–1.2)
MAGNESIUM SERPL-MCNC: 1.6 MG/DL (ref 1.6–2.6)
MCH RBC QN AUTO: 38.9 PG (ref 27–31)
MCHC RBC AUTO-ENTMCNC: 33.7 G/DL (ref 32–36)
MCV RBC AUTO: 116 FL (ref 82–98)
PLATELET # BLD AUTO: 75 K/UL (ref 150–450)
PMV BLD AUTO: 10.7 FL (ref 9.2–12.9)
POTASSIUM SERPL-SCNC: 3.8 MMOL/L (ref 3.5–5.1)
PROT SERPL-MCNC: 5.8 G/DL (ref 6–8.4)
PROTHROMBIN TIME: 22.6 SEC (ref 9–12.5)
RBC # BLD AUTO: 1.8 M/UL (ref 4.6–6.2)
SODIUM SERPL-SCNC: 131 MMOL/L (ref 136–145)
VIT B1 BLD-MCNC: 113 UG/L (ref 38–122)
WBC # BLD AUTO: 6.15 K/UL (ref 3.9–12.7)

## 2023-06-26 PROCEDURE — 99233 PR SUBSEQUENT HOSPITAL CARE,LEVL III: ICD-10-PCS | Mod: NTX,,, | Performed by: STUDENT IN AN ORGANIZED HEALTH CARE EDUCATION/TRAINING PROGRAM

## 2023-06-26 PROCEDURE — 97530 THERAPEUTIC ACTIVITIES: CPT | Mod: NTX,CQ

## 2023-06-26 PROCEDURE — 92526 ORAL FUNCTION THERAPY: CPT | Mod: NTX

## 2023-06-26 PROCEDURE — 83735 ASSAY OF MAGNESIUM: CPT | Mod: NTX | Performed by: STUDENT IN AN ORGANIZED HEALTH CARE EDUCATION/TRAINING PROGRAM

## 2023-06-26 PROCEDURE — 85027 COMPLETE CBC AUTOMATED: CPT | Mod: NTX | Performed by: STUDENT IN AN ORGANIZED HEALTH CARE EDUCATION/TRAINING PROGRAM

## 2023-06-26 PROCEDURE — 25000003 PHARM REV CODE 250: Mod: NTX | Performed by: HOSPITALIST

## 2023-06-26 PROCEDURE — 25000003 PHARM REV CODE 250: Mod: NTX | Performed by: STUDENT IN AN ORGANIZED HEALTH CARE EDUCATION/TRAINING PROGRAM

## 2023-06-26 PROCEDURE — 20600001 HC STEP DOWN PRIVATE ROOM: Mod: NTX

## 2023-06-26 PROCEDURE — 36415 COLL VENOUS BLD VENIPUNCTURE: CPT | Mod: NTX | Performed by: HOSPITALIST

## 2023-06-26 PROCEDURE — 99233 SBSQ HOSP IP/OBS HIGH 50: CPT | Mod: NTX,,, | Performed by: STUDENT IN AN ORGANIZED HEALTH CARE EDUCATION/TRAINING PROGRAM

## 2023-06-26 PROCEDURE — 85610 PROTHROMBIN TIME: CPT | Mod: NTX | Performed by: HOSPITALIST

## 2023-06-26 PROCEDURE — 94761 N-INVAS EAR/PLS OXIMETRY MLT: CPT | Mod: NTX

## 2023-06-26 PROCEDURE — 80053 COMPREHEN METABOLIC PANEL: CPT | Mod: NTX | Performed by: HOSPITALIST

## 2023-06-26 PROCEDURE — 27000221 HC OXYGEN, UP TO 24 HOURS: Mod: NTX

## 2023-06-26 RX ORDER — PANTOPRAZOLE SODIUM 40 MG/10ML
40 INJECTION, POWDER, LYOPHILIZED, FOR SOLUTION INTRAVENOUS DAILY
Status: DISCONTINUED | OUTPATIENT
Start: 2023-06-27 | End: 2023-06-28 | Stop reason: HOSPADM

## 2023-06-26 RX ADMIN — MIDODRINE HYDROCHLORIDE 15 MG: 5 TABLET ORAL at 08:06

## 2023-06-26 RX ADMIN — LACTULOSE 30 G: 20 SOLUTION ORAL at 05:06

## 2023-06-26 RX ADMIN — Medication 250 MG: at 08:06

## 2023-06-26 RX ADMIN — LACTULOSE 30 G: 20 SOLUTION ORAL at 10:06

## 2023-06-26 RX ADMIN — RIFAXIMIN 550 MG: 550 TABLET ORAL at 10:06

## 2023-06-26 RX ADMIN — CIPROFLOXACIN 500 MG: 250 TABLET, COATED ORAL at 08:06

## 2023-06-26 RX ADMIN — PANTOPRAZOLE SODIUM 40 MG: 40 TABLET, DELAYED RELEASE ORAL at 08:06

## 2023-06-26 RX ADMIN — MIDODRINE HYDROCHLORIDE 15 MG: 5 TABLET ORAL at 05:06

## 2023-06-26 RX ADMIN — MIDODRINE HYDROCHLORIDE 15 MG: 5 TABLET ORAL at 01:06

## 2023-06-26 RX ADMIN — BISACODYL 10 MG: 10 SUPPOSITORY RECTAL at 08:06

## 2023-06-26 RX ADMIN — RIFAXIMIN 550 MG: 550 TABLET ORAL at 08:06

## 2023-06-26 RX ADMIN — LACTULOSE 30 G: 20 SOLUTION ORAL at 08:06

## 2023-06-26 NOTE — PLAN OF CARE
06/26/23 0828   Discharge Reassessment   Assessment Type Discharge Planning Reassessment   Discharge Plan discussed with: Spouse/sig other   Name(s) and Number(s) Rogers Rivers (Spouse)   [phone] (Mobile)   Discharge Plan A Hospice/home   Discharge Plan B Home with family   DME Needed Upon Discharge  other (see comments)  (TBD)   Transition of Care Barriers None   Why the patient remains in the hospital Requires continued medical care   Post-Acute Status   Post-Acute Authorization Hospice   Hospice Status Referrals Sent   Discharge Delays None known at this time     CM spoke with wife and provided a list of referrals and wife requested a hospice close to home or in the [facility] area.  The wife informed CM that Dr Del Rio will monitor mentation and if no improvement , then  home with hospice. The wife preference is [facility] in [place].    Dilia Aguirre RN  Case Management  [facility]  [phone]

## 2023-06-26 NOTE — PT/OT/SLP PROGRESS
Physical Therapy Treatment    Patient Name:  Cornelio Rivers   MRN:  [id number]    Recommendations:     Discharge Recommendations: nursing facility, skilled  Discharge Equipment Recommendations: hospital bed, lift device, wheelchair  Barriers to discharge: Decreased caregiver support Pt requiring increased skilled assistance at current time.     Assessment:     Cornelio Rivers is a 58 y.o. male admitted with a medical diagnosis of Decompensated hepatic cirrhosis.  He presents with the following impairments/functional limitations: weakness, impaired endurance, impaired self care skills, impaired functional mobility, gait instability, impaired balance, impaired cognition, decreased coordination, decreased upper extremity function, decreased lower extremity function, decreased safety awareness, impaired coordination, edema, impaired cardiopulmonary response to activity requiring significant assistance and verbal cues for bed mob, scooting to EOB/HOB, static sitting at the EOB to prevent falls due to weakness, lethargy/decreased cognition, decreased trunk/head control.   In light of pt's current functional level and deficits, it is anticipated that pt will need to participate in an intense rehab program consisting of PT and OT in order to achieve full rehab potential to return to previous level of function and roles.  Pt will cont to benefit from skilled PT intervention to address deficits and improve functional mobility.   .    Rehab Prognosis: Fair; patient would benefit from acute skilled PT services to address these deficits and reach maximum level of function.    Recent Surgery: * No surgery found *      Plan:     During this hospitalization, patient to be seen 3 x/week to address the identified rehab impairments via gait training, therapeutic activities, therapeutic exercises, neuromuscular re-education and progress toward the following goals:    Plan of Care Expires:  07/15/23    Subjective     Chief Complaint: weakness,  lethargy  Pain/Comfort:  Pain Rating 1: 0/10      Objective:     Communicated with nurse (Den) prior to session.  Patient found HOB elevated with oxygen, PureWick, telemetry (waffle pad) and wife present upon PT entry to room.     General Precautions: Standard, aspiration, fall, pureed diet  Orthopedic Precautions: N/A  Braces: N/A  Respiratory Status: Nasal cannula, flow 2 L/min     Functional Mobility:  Bed Mobility:     Rolling Left:  max A with use of rail  Rolling Right: max A with use of rail  Scooting: to EOB with max A; to HOB dependent of 2 via drawsheet  Supine to Sit: max/total A of 2 for trunk elevation and LE's, exiting on the L side, HOB flat  Sit to Supine: max/total A of 2 for trunk and LE's. HOB flat  Transfers:     Sit to Stand:  from EOB NP 2* pt with poor head/trunk control while seated at the EOB  Balance: static sitting at the EOB with mod A for head/trunk control with B UE support progressing to min A and brief periods of SBA x10 min.  Pt demonstrates post lean, flexed thoracic/cervical spine, rounded shoulders      AM-PAC 6 CLICK MOBILITY  Turning over in bed (including adjusting bedclothes, sheets and blankets)?: 2  Sitting down on and standing up from a chair with arms (e.g., wheelchair, bedside commode, etc.): 1  Moving from lying on back to sitting on the side of the bed?: 2  Moving to and from a bed to a chair (including a wheelchair)?: 1  Need to walk in hospital room?: 1  Climbing 3-5 steps with a railing?: 1  Basic Mobility Total Score: 8       Treatment & Education:  Patient provided with daily orientation and goals of this PT session. They were educated to call for assistance and to transfer with hospital staff only.  Also, pt was educated on the effects of prolonged immobility and the importance of performing OOB activity and exercises to promote healing and reduce recovery time      Patient left HOB elevated with all lines intact, call button in reach, bed alarm on, and wife  notified..    GOALS:   Multidisciplinary Problems       Physical Therapy Goals          Problem: Physical Therapy    Goal Priority Disciplines Outcome Goal Variances Interventions   Physical Therapy Goal     PT, PT/OT Ongoing, Progressing     Description: Goals to be met by: 2023     Patient will increase functional independence with mobility by performin. Supine to sit with moderate assistance  2. Sit to supine with moderate assistance  3. Sit to stand transfer with maximal assistance  4. Bed to chair transfer with maximal assistance using LRAD as needed  5. Gait  x 10 feet with maximal assistance using LRAD as needed  6. Lower extremity exercise program x10 reps per handout, with supervision                        Time Tracking:     PT Received On: 23  PT Start Time: 1042     PT Stop Time: 1107  PT Total Time (min): 25 min     Billable Minutes: Therapeutic Activity 25    Treatment Type: Treatment  PT/PTA: PTA     Number of PTA visits since last PT visit: 4     2023

## 2023-06-26 NOTE — SUBJECTIVE & OBJECTIVE
Interval History:   No acute events overnight. Patient had 3 large bowel movements yesterday. This AM, was seen working with PT. He required maximal assistance sitting at edge of bed. Required additional assistance in maintaining head posture. Abd felt to be tympanic still. Discussed GOC with wife again, current plans to continue aggressive lactulose therapy and monitor for further neurological recovery. If he does not improve by wednesday, she likely will decide on hospice.     Review of Systems   Unable to perform ROS: Mental status change (lethargy and confusion)   Objective:     Vital Signs (Most Recent):  Temp: 98.2 °F (36.8 °C) (06/26/23 1123)  Pulse: 93 (06/26/23 1123)  Resp: 18 (06/26/23 1123)  BP: 101/63 (06/26/23 1123)  SpO2: 95 % (06/26/23 1123) Vital Signs (24h Range):  Temp:  [97.7 °F (36.5 °C)-98.3 °F (36.8 °C)] 98.2 °F (36.8 °C)  Pulse:  [] 93  Resp:  [17-19] 18  SpO2:  [95 %-99 %] 95 %  BP: ()/(53-66) 101/63     Weight: 85.7 kg (188 lb 15 oz)  Body mass index is 27.11 kg/m².    Intake/Output Summary (Last 24 hours) at 6/26/2023 1207  Last data filed at 6/26/2023 0647  Gross per 24 hour   Intake 200 ml   Output 400 ml   Net -200 ml           Physical Exam  Constitutional:       General: He is not in acute distress.     Appearance: He is well-developed. He is ill-appearing. He is not diaphoretic.   HENT:      Head: Normocephalic and atraumatic.      Nose: Nose normal.      Mouth/Throat:      Pharynx: No oropharyngeal exudate.   Eyes:      General: No scleral icterus.     Conjunctiva/sclera: Conjunctivae normal.      Pupils: Pupils are equal, round, and reactive to light.   Neck:      Thyroid: No thyromegaly.      Vascular: No JVD.      Trachea: No tracheal deviation.   Cardiovascular:      Rate and Rhythm: Normal rate and regular rhythm.      Heart sounds: Normal heart sounds. No murmur heard.  Pulmonary:      Effort: Pulmonary effort is normal. No respiratory distress.      Breath sounds:  Rhonchi present. No wheezing or rales.   Chest:      Chest wall: No tenderness.   Abdominal:      General: Bowel sounds are normal. There is distension (gaseous distension).      Palpations: Abdomen is soft. There is no mass.      Tenderness: There is abdominal tenderness. There is guarding. There is no rebound.      Hernia: A hernia (reducilble umbilical hernia) is present.   Musculoskeletal:         General: No tenderness.      Cervical back: Normal range of motion and neck supple.      Right lower leg: Edema present.      Left lower leg: Edema present.   Lymphadenopathy:      Cervical: No cervical adenopathy.   Skin:     General: Skin is warm and dry.      Coloration: Skin is jaundiced.      Findings: No erythema or rash.   Neurological:      Mental Status: He is alert.      Cranial Nerves: No cranial nerve deficit.      Motor: No abnormal muscle tone.      Coordination: Coordination normal.      Deep Tendon Reflexes: Reflexes are normal and symmetric. Reflexes normal.      Comments: Aox0, waxing and waning mental status. Tracks briefly across room, able to verbalize some words. Eating with assistance of wife.            Significant Labs: All pertinent labs within the past 24 hours have been reviewed.    Significant Imaging: I have reviewed all pertinent imaging results/findings within the past 24 hours.

## 2023-06-26 NOTE — PLAN OF CARE
Pt disoriented x4. Responds to voice and pain. Does not follow commands. SR on tele. 3L NC. Jaundice in appearance. Purewick in place and patent. Bed in lowest position and call light within reach.

## 2023-06-26 NOTE — PROGRESS NOTES
[facility] - Intensive Care ([address] Medicine  Progress Note    Patient Name: Cornelio Rivers  MRN: [id number]  Patient Class: IP- Inpatient   Admission Date: 6/15/2023  Length of Stay: 11 days  Attending Physician: Pascale Cotto MD  Primary Care Provider: Primary Doctor No        Subjective:     Principal Problem:Decompensated hepatic cirrhosis        HPI:  Cornelio Rivers is a 58-year-old male with a history of alcoholic cirrhosis diagnosed in 2020 complicated by portal hypertension, bleeding esophageal varices s/p banding in 2020, ascites, thrombocytopenia, coagulopathy, hepatic encephalopathy, SBP, alcohol use, and hyperlipidemia who presents as a transfer from [facility] for management of decompensated liver cirrhosis. Patient was admitted to [facility] on June 2 with altered mental status, increasing weakness, low blood pressure, poor appetite, and possible pneumonia.  He had ammonia level 102 and a MELD score 32 (sodium 118, INR 2.2, total bilirubin 16.3, creatinine 1.02). Chest x-ray on admission had mild interstitial edema versus pneumonitis with findings suggestive of developing airspace disease or atelectasis in the right chest.  Initially he had hypotension with concern for septic shock and was treated in the ICU with pressors.  He was treated with broad-spectrum antibiotics.  Hemoglobin decreased during his stay and he received packed red blood cells, but he did not have signs of GI bleeding.  INR increased during his stay and he received vitamin K/FFP.  He gradually weaned off pressors and was transferred out of the ICU on June 8.  Mental status has not improved, and he remains intermittently lethargic.  Bilirubin was stable to slightly improved, but INR has worsened.  He was empirically started on Rocephin for SBP prophylaxis (no paracentesis with elevated INR//blood cultures with no growth so far).  He has persistent abdominal distention.  Current medications  include Xifaxan and lactulose, Protonix, midodrine, ceftriaxone and Lasix/Aldactone.  Last alcohol use was noted to be about 2 months ago. Current MELD score 29.  Referring team spoke with Hepatology at [facility] with plan to transfer for further evaluation.   Current diagnoses include hepatic encephalopathy, alcoholic hepatitis, coagulopathy, and anemia.     June 13: Sodium 141, potassium 3.4, chloride 105 CO2 30, BUN 11, creatinine 0.51, glucose 103, total bilirubin 13.8, , ALT 75, INR 3, white blood cells 8.3, hemoglobin 8.4, hematocrit 25.5, platelets 89     June 12: Sodium 142, potassium 3, chloride 106, CO2 29, BUN 10, creatinine 0.59, glucose 125, calcium 9.5, magnesium 1.64, bilirubin 13.5, , ALT 78, white blood cells 7.4, hemoglobin 8.3, hematocrit 25.5, platelets 109, INR 2.9     June 9:  Right upper extremity Doppler venous ultrasound had no DVT  -chest x-ray had stable patchy bilateral pulmonary infiltrates.     June 2: COVID negative, influenza negative  -gallbladder ultrasound noted moderate volume ascites.  Thick-walled gallbladder seen, nonspecific.  Internal gallbladder sludge.  Chest x-ray had mild interstitial edema or pneumonitis with findings suggestive of developing airspace disease or atelectasis in the right chest.  -CT head had no evidence of acute intracranial hemorrhage.  Some microvascular disease is present.     February 16, 2023: EGD had no significant recurrence of varices in the esophagus.  Moderate portal hypertensive gastropathy with friable mucosa.    During my interview [place] [facility] with waxing and waning mental status. He open eyes upon calling name, speaks few words but then falls back to sleep. He is oriented to person only as he was able to tell his name and his wife's name correctly who is present at bedside. He is able to follow simple commands like open his mouth upon asking. Wife states patient had a long history of alcohol use prior to  2020 when he developed acute esophageal variceal bleeding and diagnosed with alcoholic liver cirrhosis. He has been following with local GI doctor Dr. Gallegos in [place] for cirrhosis. His last hospital admission was in January of this year when he was admitted with SBP and had 5 liter paracentesis. He was discharged home on course of prednisone and ciprofloxacin at that time. Wife states patient cut down alcohol since 2020 and his last alcohol use about 2-3 months ago. Wife noticed overall declining about 2 weeks prior to this hospital admission as he was getting more weak, confused, lethargy, losing weight and muscle mass, decreased appetite. On June 2nd wife noticed his blood pressure to be low in 70s when she drove him to [facility]. Denies tobacco, IVDU or other illicit drug use. He worked as a .          Overview/Hospital Course:  For patient presented at outside hospital transfer for transplant evaluation.  Transplant evaluation currently not an option given patient's persistent encephalopathy and inability to conduct the interview.  Patient's persistent encephalopathy without unclear etiology.  Patient is maximized on his lactulose dosing, he has been on and continuous EEG that any identifiable is seizure activity.  EEG demonstrating slow and disorganized background with waxing and waning runs of triphasic waves consistent with a moderate-severe encephalopathy with evidence of cortical irritation diffusely.  Urine lack of improvement, palliative consulted.  Patient's wife states that she would like for him to return home with her if all options are exhausted.  She however still wants to continue treatment for various causes.      Interval History:   No acute events overnight. Patient had 3 large bowel movements yesterday. This AM, was seen working with PT. He required maximal assistance sitting at edge of bed. Required additional assistance in maintaining head posture. Abd felt to be  tympanic still. Discussed GOC with wife again, current plans to continue aggressive lactulose therapy and monitor for further neurological recovery. If he does not improve by wednesday, she likely will decide on hospice.     Review of Systems   Unable to perform ROS: Mental status change (lethargy and confusion)   Objective:     Vital Signs (Most Recent):  Temp: 98.2 °F (36.8 °C) (06/26/23 1123)  Pulse: 93 (06/26/23 1123)  Resp: 18 (06/26/23 1123)  BP: 101/63 (06/26/23 1123)  SpO2: 95 % (06/26/23 1123) Vital Signs (24h Range):  Temp:  [97.7 °F (36.5 °C)-98.3 °F (36.8 °C)] 98.2 °F (36.8 °C)  Pulse:  [] 93  Resp:  [17-19] 18  SpO2:  [95 %-99 %] 95 %  BP: ()/(53-66) 101/63     Weight: 85.7 kg (188 lb 15 oz)  Body mass index is 27.11 kg/m².    Intake/Output Summary (Last 24 hours) at 6/26/2023 1207  Last data filed at 6/26/2023 0647  Gross per 24 hour   Intake 200 ml   Output 400 ml   Net -200 ml           Physical Exam  Constitutional:       General: He is not in acute distress.     Appearance: He is well-developed. He is ill-appearing. He is not diaphoretic.   HENT:      Head: Normocephalic and atraumatic.      Nose: Nose normal.      Mouth/Throat:      Pharynx: No oropharyngeal exudate.   Eyes:      General: No scleral icterus.     Conjunctiva/sclera: Conjunctivae normal.      Pupils: Pupils are equal, round, and reactive to light.   Neck:      Thyroid: No thyromegaly.      Vascular: No JVD.      Trachea: No tracheal deviation.   Cardiovascular:      Rate and Rhythm: Normal rate and regular rhythm.      Heart sounds: Normal heart sounds. No murmur heard.  Pulmonary:      Effort: Pulmonary effort is normal. No respiratory distress.      Breath sounds: Rhonchi present. No wheezing or rales.   Chest:      Chest wall: No tenderness.   Abdominal:      General: Bowel sounds are normal. There is distension (gaseous distension).      Palpations: Abdomen is soft. There is no mass.      Tenderness: There is  abdominal tenderness. There is guarding. There is no rebound.      Hernia: A hernia (reducilble umbilical hernia) is present.   Musculoskeletal:         General: No tenderness.      Cervical back: Normal range of motion and neck supple.      Right lower leg: Edema present.      Left lower leg: Edema present.   Lymphadenopathy:      Cervical: No cervical adenopathy.   Skin:     General: Skin is warm and dry.      Coloration: Skin is jaundiced.      Findings: No erythema or rash.   Neurological:      Mental Status: He is alert.      Cranial Nerves: No cranial nerve deficit.      Motor: No abnormal muscle tone.      Coordination: Coordination normal.      Deep Tendon Reflexes: Reflexes are normal and symmetric. Reflexes normal.      Comments: Aox0, waxing and waning mental status. Tracks briefly across room, able to verbalize some words. Eating with assistance of wife.            Significant Labs: All pertinent labs within the past 24 hours have been reviewed.    Significant Imaging: I have reviewed all pertinent imaging results/findings within the past 24 hours.      Assessment/Plan:      * Decompensated hepatic cirrhosis  Alcoholic liver cirrhosis with ascites   Portal hypertension   Esophageal varices w/o bleeding   Hepatic encephalopathy   Coagulopathy   Thrombocytopenia     -admitted to [facility] ICU on 6/02 for presumed septic shock and hepatic encephalopathy (ammonia 102)  -treated with pressors, broad spectrum antibiotics and lactulose with some improvement of clinical status      MELD-Na: 28 at 6/26/2023  3:57 AM  MELD: 25 at 6/26/2023  3:57 AM  Calculated from:  Serum Creatinine: 0.6 mg/dL (Using min of 1 mg/dL) at 6/26/2023  3:57 AM  Serum Sodium: 131 mmol/L at 6/26/2023  3:57 AM  Total Bilirubin: 12.4 mg/dL at 6/26/2023  3:57 AM  INR(ratio): 2.2 at 6/26/2023  3:57 AM  No signs of active bleeding or overt sepsis. Paracentesis on 6/16 no SBP   - continue lactulose and rifaximin for hepatic  encephalopathy  - hold diuretics given hyponatremia  - TTE with EF of 70%  - PETH negative   - continue midodrine for borderline low BP   - continue protonix daily   - hepatology following  - on antibitoics as infection risk high and had episode hypothermia, treat empirically with vanc/zosyn for 5 days  - due to mental status no transplant evaluation opened yet  - palliative team signed off, patient is hospice eligible should his encephalopathy conitnue.         Encephalopathy, metabolic  Waxing and waning mental status. Patient being treated for HE but still persistently encephalopathic. Neurology was consulted, patient placed on EEG. EEG showing slow and disorganized background with waxing and waning runs of triphasic waves consistent with a moderate-severe encephalopathy with evidence of cortical irritation diffusely which could be seen in liver disease  - Patient being treated for possible wernickes with IV thiamine  - MRI brain ordered 06/22/2023 which did not demonstrate any acute CVA however it did show there was some chronic changes in the basal ganglia which may be related to underlying liver disease however not particularly diagnostic  - on 06/24/2023 after switching to rectal lactulose patient more alert but still has myoclonic jerks when attempting to coordinate muscle movement. Gaseous distension noted on abdominal imaging which may be preventing further ammonia clearance as he has not had consistent BMs  - 06/25 switched back to oral lactulose, repeat KUB ordered which showed improvement in gaseous distension  - 06/26 abdomen softer but still tympanic    Hepatic encephalopathy  Grade 3 HE. Ammonia improved with lactulose and rifaximin however patient still altered  - given lack of bowel movements, lactulose switched to rectal lactulose.  Patient had 3 good bowel movements on 06/23. Continuing oral lactulose now. Had 3 large BMs on 6/25 as well  - continue rifaximin      Hyponatremia  Acutely worsening  over the past few days in setting of continued diuretic use.  Diuretics were held 6/21 however the sodium remained 125.  Could be contributing to patient's encephalopathy  - Started albumin 25 g q.12 on 6/22. Na up to 129 after 3 days of albumin, hold on further albumin for now  - Na 131 on 6/26      Moderate malnutrition  Nutrition consulted. Most recent weight and BMI monitored-     Measurements:  Wt Readings from Last 1 Encounters:   06/22/23 85.7 kg (188 lb 15 oz)   Body mass index is 27.11 kg/m².    Patient has been screened and assessed by RD.    Malnutrition Type:  Context: social/environmental circumstances  Level: moderate    Malnutrition Characteristic Summary:  Weight Loss (Malnutrition): 5% in 1 month  Energy Intake (Malnutrition): less than 75% for greater than or equal to 1 month    Interventions/Recommendations (treatment strategy):  1.    Palliative care encounter  Patient currently DNR.  If patient's mental status does not improve by 06/28, patient's wife would like for him to return home with her under hospice services       Physical debility  -due to liver cirrhosis and prolonged hospital stay for 2 weeks   -PT evaluation and treat       Hypomagnesemia  -likely from diuretic use   -will replace with MgSO4      Hypokalemia  - CTM daily, especially with diuretic usage      Coagulopathy  INR remains elevated at 2.2 despite vitamin K  - Due to decomp liver cirrhosis. No signs of bleeding   - received FFP and vitamin K at outside hospital   - s/p vitamin K dailyx 3 days  - monitor INR daily      Thrombocytopenia  Platelet 107 6/22  - due to portal hypertension and splenic sequestration   - monitor trend   - transfuse for hgb <10k or <50k with active bleed    Secondary esophageal varices without bleeding  Had initial episode of bleeding in 2020 treated with banding   - no further bleeding episode since then per wife   - follows with local GI. Dr. Gallegos   - last EGD in February 2023 showed no bleeding  from varices, but had portal hypertensive gastropathy   - continue protonix daily       Portal hypertension  -see above under decomp cirrhosis       Alcoholic cirrhosis of liver with ascites  MELD-Na: 29 at 6/25/2023  7:11 AM  MELD: 25 at 6/25/2023  7:11 AM  Calculated from:  Serum Creatinine: 0.6 mg/dL (Using min of 1 mg/dL) at 6/25/2023  7:11 AM  Serum Sodium: 129 mmol/L at 6/25/2023  7:11 AM  Total Bilirubin: 12.0 mg/dL at 6/25/2023  7:11 AM  INR(ratio): 2.2 at 6/25/2023  7:11 AM  Cut down alcohol in 2020 when diagnosed with esophageal varices and cirrhosis   - last alcohol use 2-3 months ago per wife   - PETH negative  - unable to have psychiatry assessment as mental status has yet to improve.           VTE Risk Mitigation (From admission, onward)         Ordered     IP VTE LOW RISK PATIENT  Once         06/15/23 0148     Place sequential compression device  Until discontinued         06/15/23 0148                Discharge Planning   NICKOLAS: 6/29/2023     Code Status: DNR   Is the patient medically ready for discharge?: No    Reason for patient still in hospital (select all that apply): Patient trending condition  Discharge Plan A: Hospice/home   Discharge Delays: None known at this time              Pascale Cotto MD  Department of Hospital Medicine   [facility] - Intensive Care ([place]

## 2023-06-26 NOTE — PT/OT/SLP PROGRESS
Occupational Therapy      Patient Name:  Cornelio Rivers   MRN:  [id number]    Patient not seen today secondary to on first attempt pt. Being cleaned on second attempt in radiology.    6/26/2023

## 2023-06-26 NOTE — ASSESSMENT & PLAN NOTE
Patient currently DNR.  If patient's mental status does not improve by 06/28, patient's wife would like for him to return home with her under hospice services

## 2023-06-26 NOTE — NURSING
Alert and confused. No wob or sign of distress. 3 large stools for this shift. Wife at bedside. Safety measures in place.

## 2023-06-26 NOTE — PT/OT/SLP PROGRESS
Speech Language Pathology Treatment    Patient Name:  Cornelio Rivers   MRN:  [id number]  Admitting Diagnosis: Decompensated hepatic cirrhosis    Recommendations:                 General Recommendations:  Dysphagia therapy  Diet recommendations:  Pleasure Feeding, Puree Diet - IDDSI Level 4, Liquid Diet Level: Thin liquids - IDDSI Level 0   Aspiration Precautions: 1 bite/sip at a time, Eliminate distractions, Feed only when awake/alert, HOB to 90 degrees, Meds crushed in puree, Puree for pleasure, Small bites/sips, and Strict aspiration precautions   General Precautions: Standard, fall, aspiration, pureed diet  Communication strategies:  none    Assessment:     Cornelio Rivers is a 58 y.o. male with an SLP diagnosis of Dysphagia. Pt's poor alertness continues to be a limiting factor regarding progress with SLP.     Subjective     Pt asleep upon SLP entry. Pt's family member present at bedside.    Pain/Comfort:  Pain Rating 1: 0/10    Respiratory Status: Nasal cannula    Objective:     Has the patient been evaluated by SLP for swallowing?   Yes  Keep patient NPO? No      Pt presents with poor JOHN. Per spouse, pt was exhausted from recently completing a session with PT. Swallow safety remains precarious 2/2 persistent waxing and waning presentation. Per spouse, he continues to tolerate the small, limited amounts of po intake accepted during times of wakefulness. Pt unable to rouse appropriately for po trials this date therefore trials deferred. SLP explained multiple risk factors for aspiration and only feeding the pt for pleasure purposes if he is awake/alert/accepting and seated with HOB completely upright. Pt's family member expressed agreement and understanding. Continue to monitor for signs and symptoms of aspiration and discontinue oral feeding should you notice any of the following: watery eyes, reddened facial area, wet vocal quality, increased work of breathing, change in respiratory status, increased congestion,  coughing, fever, etc.    Goals:   Multidisciplinary Problems       SLP Goals          Problem: SLP    Goal Priority Disciplines Outcome   SLP Goal     SLP Ongoing, Progressing   Description: Speech Language Pathology Goals  Goals expected to be met by 6/30  1. Pt will tolerate mechanical soft solids and thin liquids without any overt s/sx of airway compromise.  2. Pt will participate in ongoing swallow assessment to determine least restrictive PO diet.                                   Plan:     Patient to be seen:  2 x/week   Plan of Care expires:  07/16/23  Plan of Care reviewed with:  patient, spouse   SLP Follow-Up:  Yes       Discharge recommendations:  nursing facility, skilled    Time Tracking:     SLP Treatment Date:   06/26/23  Speech Start Time:  1204  Speech Stop Time:  1209     Speech Total Time (min):  5 min    Billable Minutes: Treatment Swallowing Dysfunction 5  06/26/2023

## 2023-06-26 NOTE — ASSESSMENT & PLAN NOTE
Alcoholic liver cirrhosis with ascites   Portal hypertension   Esophageal varices w/o bleeding   Hepatic encephalopathy   Coagulopathy   Thrombocytopenia     -admitted to [facility] ICU on 6/02 for presumed septic shock and hepatic encephalopathy (ammonia 102)  -treated with pressors, broad spectrum antibiotics and lactulose with some improvement of clinical status      MELD-Na: 28 at 6/26/2023  3:57 AM  MELD: 25 at 6/26/2023  3:57 AM  Calculated from:  Serum Creatinine: 0.6 mg/dL (Using min of 1 mg/dL) at 6/26/2023  3:57 AM  Serum Sodium: 131 mmol/L at 6/26/2023  3:57 AM  Total Bilirubin: 12.4 mg/dL at 6/26/2023  3:57 AM  INR(ratio): 2.2 at 6/26/2023  3:57 AM  No signs of active bleeding or overt sepsis. Paracentesis on 6/16 no SBP   - continue lactulose and rifaximin for hepatic encephalopathy  - hold diuretics given hyponatremia  - TTE with EF of 70%  - PETH negative   - continue midodrine for borderline low BP   - continue protonix daily   - hepatology following  - on antibitoics as infection risk high and had episode hypothermia, treat empirically with vanc/zosyn for 5 days  - due to mental status no transplant evaluation opened yet  - palliative team signed off, patient is hospice eligible should his encephalopathy conitnue.

## 2023-06-26 NOTE — ASSESSMENT & PLAN NOTE
Grade 3 HE. Ammonia improved with lactulose and rifaximin however patient still altered  - given lack of bowel movements, lactulose switched to rectal lactulose.  Patient had 3 good bowel movements on 06/23. Continuing oral lactulose now. Had 3 large BMs on 6/25 as well  - continue rifaximin

## 2023-06-26 NOTE — ASSESSMENT & PLAN NOTE
Waxing and waning mental status. Patient being treated for HE but still persistently encephalopathic. Neurology was consulted, patient placed on EEG. EEG showing slow and disorganized background with waxing and waning runs of triphasic waves consistent with a moderate-severe encephalopathy with evidence of cortical irritation diffusely which could be seen in liver disease  - Patient being treated for possible wernickes with IV thiamine  - MRI brain ordered 06/22/2023 which did not demonstrate any acute CVA however it did show there was some chronic changes in the basal ganglia which may be related to underlying liver disease however not particularly diagnostic  - on 06/24/2023 after switching to rectal lactulose patient more alert but still has myoclonic jerks when attempting to coordinate muscle movement. Gaseous distension noted on abdominal imaging which may be preventing further ammonia clearance as he has not had consistent BMs  - 06/25 switched back to oral lactulose, repeat KUB ordered which showed improvement in gaseous distension  - 06/26 abdomen softer but still tympanic

## 2023-06-26 NOTE — ASSESSMENT & PLAN NOTE
Acutely worsening over the past few days in setting of continued diuretic use.  Diuretics were held 6/21 however the sodium remained 125.  Could be contributing to patient's encephalopathy  - Started albumin 25 g q.12 on 6/22. Na up to 129 after 3 days of albumin, hold on further albumin for now  - Na 131 on 6/26

## 2023-06-27 PROBLEM — E87.6 HYPOKALEMIA: Status: RESOLVED | Noted: 2023-06-15 | Resolved: 2023-06-27

## 2023-06-27 PROBLEM — E83.42 HYPOMAGNESEMIA: Status: RESOLVED | Noted: 2023-06-15 | Resolved: 2023-06-27

## 2023-06-27 LAB
ALBUMIN SERPL BCP-MCNC: 3.3 G/DL (ref 3.5–5.2)
ALP SERPL-CCNC: 99 U/L (ref 55–135)
ALT SERPL W/O P-5'-P-CCNC: 34 U/L (ref 10–44)
ANION GAP SERPL CALC-SCNC: 9 MMOL/L (ref 8–16)
AST SERPL-CCNC: 54 U/L (ref 10–40)
BILIRUB SERPL-MCNC: 12.3 MG/DL (ref 0.1–1)
BUN SERPL-MCNC: 7 MG/DL (ref 6–20)
CALCIUM SERPL-MCNC: 9.5 MG/DL (ref 8.7–10.5)
CHLORIDE SERPL-SCNC: 93 MMOL/L (ref 95–110)
CO2 SERPL-SCNC: 30 MMOL/L (ref 23–29)
CREAT SERPL-MCNC: 0.7 MG/DL (ref 0.5–1.4)
ERYTHROCYTE [DISTWIDTH] IN BLOOD BY AUTOMATED COUNT: 21.6 % (ref 11.5–14.5)
EST. GFR  (NO RACE VARIABLE): >60 ML/MIN/1.73 M^2
GLUCOSE SERPL-MCNC: 122 MG/DL (ref 70–110)
HCT VFR BLD AUTO: 19.8 % (ref 40–54)
HGB BLD-MCNC: 6.7 G/DL (ref 14–18)
INR PPP: 2.3 (ref 0.8–1.2)
MAGNESIUM SERPL-MCNC: 1.6 MG/DL (ref 1.6–2.6)
MCH RBC QN AUTO: 39 PG (ref 27–31)
MCHC RBC AUTO-ENTMCNC: 33.8 G/DL (ref 32–36)
MCV RBC AUTO: 115 FL (ref 82–98)
PLATELET # BLD AUTO: 86 K/UL (ref 150–450)
PMV BLD AUTO: 10 FL (ref 9.2–12.9)
POTASSIUM SERPL-SCNC: 3.8 MMOL/L (ref 3.5–5.1)
PROT SERPL-MCNC: 5.6 G/DL (ref 6–8.4)
PROTHROMBIN TIME: 23 SEC (ref 9–12.5)
RBC # BLD AUTO: 1.72 M/UL (ref 4.6–6.2)
SODIUM SERPL-SCNC: 132 MMOL/L (ref 136–145)
WBC # BLD AUTO: 6.67 K/UL (ref 3.9–12.7)

## 2023-06-27 PROCEDURE — C9113 INJ PANTOPRAZOLE SODIUM, VIA: HCPCS | Mod: NTX | Performed by: STUDENT IN AN ORGANIZED HEALTH CARE EDUCATION/TRAINING PROGRAM

## 2023-06-27 PROCEDURE — 25000003 PHARM REV CODE 250: Mod: NTX | Performed by: STUDENT IN AN ORGANIZED HEALTH CARE EDUCATION/TRAINING PROGRAM

## 2023-06-27 PROCEDURE — 99233 SBSQ HOSP IP/OBS HIGH 50: CPT | Mod: NTX,,, | Performed by: STUDENT IN AN ORGANIZED HEALTH CARE EDUCATION/TRAINING PROGRAM

## 2023-06-27 PROCEDURE — 83735 ASSAY OF MAGNESIUM: CPT | Mod: NTX | Performed by: STUDENT IN AN ORGANIZED HEALTH CARE EDUCATION/TRAINING PROGRAM

## 2023-06-27 PROCEDURE — 97530 THERAPEUTIC ACTIVITIES: CPT | Mod: NTX,CO

## 2023-06-27 PROCEDURE — 20600001 HC STEP DOWN PRIVATE ROOM: Mod: NTX

## 2023-06-27 PROCEDURE — 85610 PROTHROMBIN TIME: CPT | Mod: NTX | Performed by: HOSPITALIST

## 2023-06-27 PROCEDURE — 36415 COLL VENOUS BLD VENIPUNCTURE: CPT | Mod: NTX | Performed by: HOSPITALIST

## 2023-06-27 PROCEDURE — 99233 PR SUBSEQUENT HOSPITAL CARE,LEVL III: ICD-10-PCS | Mod: NTX,,, | Performed by: STUDENT IN AN ORGANIZED HEALTH CARE EDUCATION/TRAINING PROGRAM

## 2023-06-27 PROCEDURE — 63600175 PHARM REV CODE 636 W HCPCS: Mod: NTX | Performed by: STUDENT IN AN ORGANIZED HEALTH CARE EDUCATION/TRAINING PROGRAM

## 2023-06-27 PROCEDURE — 85027 COMPLETE CBC AUTOMATED: CPT | Mod: NTX | Performed by: STUDENT IN AN ORGANIZED HEALTH CARE EDUCATION/TRAINING PROGRAM

## 2023-06-27 PROCEDURE — 97110 THERAPEUTIC EXERCISES: CPT | Mod: NTX,CO

## 2023-06-27 PROCEDURE — 99900035 HC TECH TIME PER 15 MIN (STAT): Mod: NTX

## 2023-06-27 PROCEDURE — 80053 COMPREHEN METABOLIC PANEL: CPT | Mod: NTX | Performed by: HOSPITALIST

## 2023-06-27 PROCEDURE — 27000221 HC OXYGEN, UP TO 24 HOURS: Mod: NTX

## 2023-06-27 PROCEDURE — 25000003 PHARM REV CODE 250: Mod: NTX | Performed by: HOSPITALIST

## 2023-06-27 RX ORDER — LACTULOSE 10 G/15ML
30 SOLUTION ORAL EVERY 6 HOURS
Status: DISCONTINUED | OUTPATIENT
Start: 2023-06-27 | End: 2023-06-28 | Stop reason: HOSPADM

## 2023-06-27 RX ORDER — ZINC SULFATE 50(220)MG
220 CAPSULE ORAL DAILY
Status: DISCONTINUED | OUTPATIENT
Start: 2023-06-27 | End: 2023-06-28 | Stop reason: HOSPADM

## 2023-06-27 RX ORDER — POLYETHYLENE GLYCOL 3350 17 G/17G
17 POWDER, FOR SOLUTION ORAL DAILY
Status: DISCONTINUED | OUTPATIENT
Start: 2023-06-27 | End: 2023-06-28 | Stop reason: HOSPADM

## 2023-06-27 RX ORDER — LACTULOSE 10 G/15ML
200 SOLUTION ORAL; RECTAL 3 TIMES DAILY
Status: DISCONTINUED | OUTPATIENT
Start: 2023-06-27 | End: 2023-06-28

## 2023-06-27 RX ORDER — THIAMINE HCL 100 MG
100 TABLET ORAL DAILY
Status: DISCONTINUED | OUTPATIENT
Start: 2023-06-28 | End: 2023-06-28 | Stop reason: HOSPADM

## 2023-06-27 RX ADMIN — POLYETHYLENE GLYCOL 3350 17 G: 17 POWDER, FOR SOLUTION ORAL at 09:06

## 2023-06-27 RX ADMIN — MIDODRINE HYDROCHLORIDE 15 MG: 5 TABLET ORAL at 12:06

## 2023-06-27 RX ADMIN — MIDODRINE HYDROCHLORIDE 15 MG: 5 TABLET ORAL at 06:06

## 2023-06-27 RX ADMIN — MIDODRINE HYDROCHLORIDE 15 MG: 5 TABLET ORAL at 09:06

## 2023-06-27 RX ADMIN — RIFAXIMIN 550 MG: 550 TABLET ORAL at 09:06

## 2023-06-27 RX ADMIN — PANTOPRAZOLE SODIUM 40 MG: 40 INJECTION, POWDER, FOR SOLUTION INTRAVENOUS at 09:06

## 2023-06-27 RX ADMIN — LACTULOSE 200 G: 10 SOLUTION ORAL at 09:06

## 2023-06-27 RX ADMIN — BISACODYL 10 MG: 10 SUPPOSITORY RECTAL at 09:06

## 2023-06-27 RX ADMIN — LACTULOSE 30 G: 20 SOLUTION ORAL at 12:06

## 2023-06-27 RX ADMIN — ZINC SULFATE 220 MG (50 MG) CAPSULE 220 MG: CAPSULE at 09:06

## 2023-06-27 RX ADMIN — CIPROFLOXACIN 500 MG: 250 TABLET, COATED ORAL at 09:06

## 2023-06-27 RX ADMIN — LACTULOSE 30 G: 20 SOLUTION ORAL at 09:06

## 2023-06-27 RX ADMIN — LACTULOSE 200 G: 10 SOLUTION ORAL at 03:06

## 2023-06-27 RX ADMIN — Medication 250 MG: at 09:06

## 2023-06-27 NOTE — SUBJECTIVE & OBJECTIVE
Interval History:   No acute events overnight. Patient only had one BM on 6/26. He is less interactive/communicative today. Concern for worsening HE as a result of lack of Bms. Patient's wife expressed uncertainty on patient's prognosis. Informed her that we can try rectal lactulose again however, if she does not improve, he likely may not have much neurological recovery and that my recommendation would be hospice.     Review of Systems   Unable to perform ROS: Mental status change (lethargy and confusion)   Objective:     Vital Signs (Most Recent):  Temp: 98 °F (36.7 °C) (06/27/23 1221)  Pulse: 105 (06/27/23 1221)  Resp: 18 (06/27/23 1221)  BP: (!) 100/59 (06/27/23 1221)  SpO2: 100 % (06/27/23 1221) Vital Signs (24h Range):  Temp:  [97.5 °F (36.4 °C)-98.2 °F (36.8 °C)] 98 °F (36.7 °C)  Pulse:  [] 105  Resp:  [16-18] 18  SpO2:  [94 %-100 %] 100 %  BP: ()/(57-66) 100/59     Weight: 85.7 kg (188 lb 15 oz)  Body mass index is 27.11 kg/m².  No intake or output data in the 24 hours ending 06/27/23 1421        Physical Exam  Constitutional:       General: He is not in acute distress.     Appearance: He is well-developed. He is ill-appearing. He is not diaphoretic.   HENT:      Head: Normocephalic and atraumatic.      Nose: Nose normal.      Mouth/Throat:      Pharynx: No oropharyngeal exudate.   Eyes:      General: No scleral icterus.     Conjunctiva/sclera: Conjunctivae normal.      Pupils: Pupils are equal, round, and reactive to light.   Neck:      Thyroid: No thyromegaly.      Vascular: No JVD.      Trachea: No tracheal deviation.   Cardiovascular:      Rate and Rhythm: Normal rate and regular rhythm.      Heart sounds: Normal heart sounds. No murmur heard.  Pulmonary:      Effort: Pulmonary effort is normal. No respiratory distress.      Breath sounds: Rhonchi present. No wheezing or rales.   Chest:      Chest wall: No tenderness.   Abdominal:      General: Bowel sounds are normal. There is distension  (gaseous distension).      Palpations: Abdomen is soft. There is no mass.      Tenderness: There is abdominal tenderness. There is guarding. There is no rebound.      Hernia: A hernia (reducilble umbilical hernia) is present.   Musculoskeletal:         General: No tenderness.      Cervical back: Normal range of motion and neck supple.      Right lower leg: Edema present.      Left lower leg: Edema present.   Lymphadenopathy:      Cervical: No cervical adenopathy.   Skin:     General: Skin is warm and dry.      Coloration: Skin is jaundiced.      Findings: No erythema or rash.   Neurological:      Mental Status: He is alert.      Cranial Nerves: No cranial nerve deficit.      Motor: No abnormal muscle tone.      Coordination: Coordination normal.      Deep Tendon Reflexes: Reflexes are normal and symmetric. Reflexes normal.      Comments: Aox0, waxing and waning mental status. Tracks briefly across room, able to verbalize some words. Eating with assistance of wife.            Significant Labs: All pertinent labs within the past 24 hours have been reviewed.    Significant Imaging: I have reviewed all pertinent imaging results/findings within the past 24 hours.

## 2023-06-27 NOTE — ASSESSMENT & PLAN NOTE
Waxing and waning mental status. Patient being treated for HE but still persistently encephalopathic. Neurology was consulted, patient placed on EEG. EEG showing slow and disorganized background with waxing and waning runs of triphasic waves consistent with a moderate-severe encephalopathy with evidence of cortical irritation diffusely which could be seen in liver disease  - Patient being treated for possible wernickes with IV thiamine  - MRI brain ordered 06/22/2023 which did not demonstrate any acute CVA however it did show there was some chronic changes in the basal ganglia which may be related to underlying liver disease however not particularly diagnostic  - on 06/24/2023 after switching to rectal lactulose patient more alert but still has myoclonic jerks when attempting to coordinate muscle movement. Gaseous distension noted on abdominal imaging which may be preventing further ammonia clearance as he has not had consistent BMs  - 06/25 switched back to oral lactulose, repeat KUB ordered which showed improvement in gaseous distension  - 06/26 abdomen softer but still tympanic  - 06/27 mentation worse, switched back to rectal lactulose

## 2023-06-27 NOTE — PROGRESS NOTES
[facility] - Intensive Care ([address] Medicine  Progress Note    Patient Name: Cornelio Rivers  MRN: [id number]  Patient Class: IP- Inpatient   Admission Date: 6/15/2023  Length of Stay: 12 days  Attending Physician: Pascale Cotto MD  Primary Care Provider: Primary Doctor No        Subjective:     Principal Problem:Decompensated hepatic cirrhosis        HPI:  Cornelio Rivers is a 58-year-old male with a history of alcoholic cirrhosis diagnosed in 2020 complicated by portal hypertension, bleeding esophageal varices s/p banding in 2020, ascites, thrombocytopenia, coagulopathy, hepatic encephalopathy, SBP, alcohol use, and hyperlipidemia who presents as a transfer from [facility] for management of decompensated liver cirrhosis. Patient was admitted to [facility] on June 2 with altered mental status, increasing weakness, low blood pressure, poor appetite, and possible pneumonia.  He had ammonia level 102 and a MELD score 32 (sodium 118, INR 2.2, total bilirubin 16.3, creatinine 1.02). Chest x-ray on admission had mild interstitial edema versus pneumonitis with findings suggestive of developing airspace disease or atelectasis in the right chest.  Initially he had hypotension with concern for septic shock and was treated in the ICU with pressors.  He was treated with broad-spectrum antibiotics.  Hemoglobin decreased during his stay and he received packed red blood cells, but he did not have signs of GI bleeding.  INR increased during his stay and he received vitamin K/FFP.  He gradually weaned off pressors and was transferred out of the ICU on June 8.  Mental status has not improved, and he remains intermittently lethargic.  Bilirubin was stable to slightly improved, but INR has worsened.  He was empirically started on Rocephin for SBP prophylaxis (no paracentesis with elevated INR//blood cultures with no growth so far).  He has persistent abdominal distention.  Current medications  include Xifaxan and lactulose, Protonix, midodrine, ceftriaxone and Lasix/Aldactone.  Last alcohol use was noted to be about 2 months ago. Current MELD score 29.  Referring team spoke with Hepatology at [facility] with plan to transfer for further evaluation.   Current diagnoses include hepatic encephalopathy, alcoholic hepatitis, coagulopathy, and anemia.     June 13: Sodium 141, potassium 3.4, chloride 105 CO2 30, BUN 11, creatinine 0.51, glucose 103, total bilirubin 13.8, , ALT 75, INR 3, white blood cells 8.3, hemoglobin 8.4, hematocrit 25.5, platelets 89     June 12: Sodium 142, potassium 3, chloride 106, CO2 29, BUN 10, creatinine 0.59, glucose 125, calcium 9.5, magnesium 1.64, bilirubin 13.5, , ALT 78, white blood cells 7.4, hemoglobin 8.3, hematocrit 25.5, platelets 109, INR 2.9     June 9:  Right upper extremity Doppler venous ultrasound had no DVT  -chest x-ray had stable patchy bilateral pulmonary infiltrates.     June 2: COVID negative, influenza negative  -gallbladder ultrasound noted moderate volume ascites.  Thick-walled gallbladder seen, nonspecific.  Internal gallbladder sludge.  Chest x-ray had mild interstitial edema or pneumonitis with findings suggestive of developing airspace disease or atelectasis in the right chest.  -CT head had no evidence of acute intracranial hemorrhage.  Some microvascular disease is present.     February 16, 2023: EGD had no significant recurrence of varices in the esophagus.  Moderate portal hypertensive gastropathy with friable mucosa.    During my interview upon arrival to [facility], patient with waxing and waning mental status. He open eyes upon calling name, speaks few words but then falls back to sleep. He is oriented to person only as he was able to tell his name and his wife's name correctly who is present at bedside. He is able to follow simple commands like open his mouth upon asking. Wife states patient had a long history of alcohol use prior to  2020 when he developed acute esophageal variceal bleeding and diagnosed with alcoholic liver cirrhosis. He has been following with local GI doctor Dr. Gallegos in [place] for cirrhosis. His last hospital admission was in January of this year when he was admitted with SBP and had 5 liter paracentesis. He was discharged home on course of prednisone and ciprofloxacin at that time. Wife states patient cut down alcohol since 2020 and his last alcohol use about 2-3 months ago. Wife noticed overall declining about 2 weeks prior to this hospital admission as he was getting more weak, confused, lethargy, losing weight and muscle mass, decreased appetite. On June 2nd wife noticed his blood pressure to be low in 70s when she drove him to [facility]. Denies tobacco, IVDU or other illicit drug use. He worked as a .          Overview/Hospital Course:  For patient presented at outside hospital transfer for transplant evaluation.  Transplant evaluation currently not an option given patient's persistent encephalopathy and inability to conduct the interview.  Patient's persistent encephalopathy without unclear etiology.  Patient is maximized on his lactulose dosing, he has been on and continuous EEG that any identifiable is seizure activity.  EEG demonstrating slow and disorganized background with waxing and waning runs of triphasic waves consistent with a moderate-severe encephalopathy with evidence of cortical irritation diffusely.  Urine lack of improvement, palliative consulted.  Patient's wife states that she would like for him to return home with her if all options are exhausted.  She however still wants to continue treatment for various causes.      Interval History:   No acute events overnight. Patient only had one BM on 6/26. He is less interactive/communicative today. Concern for worsening HE as a result of lack of Bms. Patient's wife expressed uncertainty on patient's prognosis. Informed her that we  can try rectal lactulose again however, if she does not improve, he likely may not have much neurological recovery and that my recommendation would be hospice.     Review of Systems   Unable to perform ROS: Mental status change (lethargy and confusion)   Objective:     Vital Signs (Most Recent):  Temp: 98 °F (36.7 °C) (06/27/23 1221)  Pulse: 105 (06/27/23 1221)  Resp: 18 (06/27/23 1221)  BP: (!) 100/59 (06/27/23 1221)  SpO2: 100 % (06/27/23 1221) Vital Signs (24h Range):  Temp:  [97.5 °F (36.4 °C)-98.2 °F (36.8 °C)] 98 °F (36.7 °C)  Pulse:  [] 105  Resp:  [16-18] 18  SpO2:  [94 %-100 %] 100 %  BP: ()/(57-66) 100/59     Weight: 85.7 kg (188 lb 15 oz)  Body mass index is 27.11 kg/m².  No intake or output data in the 24 hours ending 06/27/23 1421        Physical Exam  Constitutional:       General: He is not in acute distress.     Appearance: He is well-developed. He is ill-appearing. He is not diaphoretic.   HENT:      Head: Normocephalic and atraumatic.      Nose: Nose normal.      Mouth/Throat:      Pharynx: No oropharyngeal exudate.   Eyes:      General: No scleral icterus.     Conjunctiva/sclera: Conjunctivae normal.      Pupils: Pupils are equal, round, and reactive to light.   Neck:      Thyroid: No thyromegaly.      Vascular: No JVD.      Trachea: No tracheal deviation.   Cardiovascular:      Rate and Rhythm: Normal rate and regular rhythm.      Heart sounds: Normal heart sounds. No murmur heard.  Pulmonary:      Effort: Pulmonary effort is normal. No respiratory distress.      Breath sounds: Rhonchi present. No wheezing or rales.   Chest:      Chest wall: No tenderness.   Abdominal:      General: Bowel sounds are normal. There is distension (gaseous distension).      Palpations: Abdomen is soft. There is no mass.      Tenderness: There is abdominal tenderness. There is guarding. There is no rebound.      Hernia: A hernia (reducilble umbilical hernia) is present.   Musculoskeletal:         General:  No tenderness.      Cervical back: Normal range of motion and neck supple.      Right lower leg: Edema present.      Left lower leg: Edema present.   Lymphadenopathy:      Cervical: No cervical adenopathy.   Skin:     General: Skin is warm and dry.      Coloration: Skin is jaundiced.      Findings: No erythema or rash.   Neurological:      Mental Status: He is alert.      Cranial Nerves: No cranial nerve deficit.      Motor: No abnormal muscle tone.      Coordination: Coordination normal.      Deep Tendon Reflexes: Reflexes are normal and symmetric. Reflexes normal.      Comments: Aox0, waxing and waning mental status. Tracks briefly across room, able to verbalize some words. Eating with assistance of wife.            Significant Labs: All pertinent labs within the past 24 hours have been reviewed.    Significant Imaging: I have reviewed all pertinent imaging results/findings within the past 24 hours.      Assessment/Plan:      * Decompensated hepatic cirrhosis  Alcoholic liver cirrhosis with ascites   Portal hypertension   Esophageal varices w/o bleeding   Hepatic encephalopathy   Coagulopathy   Thrombocytopenia     -admitted to [facility] ICU on 6/02 for presumed septic shock and hepatic encephalopathy (ammonia 102)  -treated with pressors, broad spectrum antibiotics and lactulose with some improvement of clinical status      MELD-Na: 28 at 6/26/2023  3:57 AM  MELD: 25 at 6/26/2023  3:57 AM  Calculated from:  Serum Creatinine: 0.6 mg/dL (Using min of 1 mg/dL) at 6/26/2023  3:57 AM  Serum Sodium: 131 mmol/L at 6/26/2023  3:57 AM  Total Bilirubin: 12.4 mg/dL at 6/26/2023  3:57 AM  INR(ratio): 2.2 at 6/26/2023  3:57 AM  No signs of active bleeding or overt sepsis. Paracentesis on 6/16 no SBP   - continue lactulose and rifaximin for hepatic encephalopathy  - hold diuretics given hyponatremia  - TTE with EF of 70%  - PETH negative   - continue midodrine for borderline low BP   - continue protonix daily    - hepatology following  - on antibitoics as infection risk high and had episode hypothermia, treat empirically with vanc/zosyn for 5 days  - due to mental status no transplant evaluation opened yet  - palliative team signed off, patient is hospice eligible should his encephalopathy conitnue.         Encephalopathy, metabolic  Waxing and waning mental status. Patient being treated for HE but still persistently encephalopathic. Neurology was consulted, patient placed on EEG. EEG showing slow and disorganized background with waxing and waning runs of triphasic waves consistent with a moderate-severe encephalopathy with evidence of cortical irritation diffusely which could be seen in liver disease  - Patient being treated for possible wernickes with IV thiamine  - MRI brain ordered 06/22/2023 which did not demonstrate any acute CVA however it did show there was some chronic changes in the basal ganglia which may be related to underlying liver disease however not particularly diagnostic  - on 06/24/2023 after switching to rectal lactulose patient more alert but still has myoclonic jerks when attempting to coordinate muscle movement. Gaseous distension noted on abdominal imaging which may be preventing further ammonia clearance as he has not had consistent BMs  - 06/25 switched back to oral lactulose, repeat KUB ordered which showed improvement in gaseous distension  - 06/26 abdomen softer but still tympanic  - 06/27 mentation worse, switched back to rectal lactulose    Hepatic encephalopathy  Grade 3 HE. Ammonia improved with lactulose and rifaximin however patient still altered  - given lack of bowel movements, lactulose switched to rectal lactulose.  Patient had 3 good bowel movements on 06/23. Continuing oral lactulose now. Had 3 large BMs on 6/25 as well  - continue rifaximin      Hyponatremia  Acutely worsening over the past few days in setting of continued diuretic use.  Diuretics were held 6/21 however the  sodium remained 125.  Could be contributing to patient's encephalopathy  - Started albumin 25 g q.12 on 6/22. Na up to 129 after 3 days of albumin, hold on further albumin for now  - Na 131 on 6/26      Moderate malnutrition  Nutrition consulted. Most recent weight and BMI monitored-     Measurements:  Wt Readings from Last 1 Encounters:   06/22/23 85.7 kg (188 lb 15 oz)   Body mass index is 27.11 kg/m².    Patient has been screened and assessed by RD.    Malnutrition Type:  Context: social/environmental circumstances  Level: moderate    Malnutrition Characteristic Summary:  Weight Loss (Malnutrition): 5% in 1 month  Energy Intake (Malnutrition): less than 75% for greater than or equal to 1 month    Interventions/Recommendations (treatment strategy):  1.    Palliative care encounter  Patient currently DNR.  If patient's mental status does not improve by 06/28, patient's wife would like for him to return home with her under hospice services   - Reconfirmed patient's wife's wishes since patient had decline in mental status on 06/27, if mentation does not improve by 06/28, likely will dc to hospice.      Physical debility  -due to liver cirrhosis and prolonged hospital stay for 2 weeks   -PT evaluation and treat       Coagulopathy  INR remains elevated at 2.2 despite vitamin K  - Due to decomp liver cirrhosis. No signs of bleeding   - received FFP and vitamin K at outside hospital   - s/p vitamin K dailyx 3 days  - monitor INR daily      Thrombocytopenia  Platelet 86 6/27  - due to portal hypertension and splenic sequestration   - monitor trend   - transfuse for hgb <10k or <50k with active bleed    Secondary esophageal varices without bleeding  Had initial episode of bleeding in 2020 treated with banding   - no further bleeding episode since then per wife   - follows with local GI. Dr. Gallegos   - last EGD in February 2023 showed no bleeding from varices, but had portal hypertensive gastropathy   - continue protonix  daily       Portal hypertension  -see above under decomp cirrhosis       Alcoholic cirrhosis of liver with ascites  MELD-Na: 29 at 6/25/2023  7:11 AM  MELD: 25 at 6/25/2023  7:11 AM  Calculated from:  Serum Creatinine: 0.6 mg/dL (Using min of 1 mg/dL) at 6/25/2023  7:11 AM  Serum Sodium: 129 mmol/L at 6/25/2023  7:11 AM  Total Bilirubin: 12.0 mg/dL at 6/25/2023  7:11 AM  INR(ratio): 2.2 at 6/25/2023  7:11 AM  Cut down alcohol in 2020 when diagnosed with esophageal varices and cirrhosis   - last alcohol use 2-3 months ago per wife   - PETH negative  - unable to have psychiatry assessment as mental status has yet to improve.           VTE Risk Mitigation (From admission, onward)         Ordered     IP VTE LOW RISK PATIENT  Once         06/15/23 0148     Place sequential compression device  Until discontinued         06/15/23 0148                Discharge Planning   NICKOLAS: 6/29/2023     Code Status: DNR   Is the patient medically ready for discharge?: No    Reason for patient still in hospital (select all that apply): Patient trending condition  Discharge Plan A: Hospice/home   Discharge Delays: None known at this time              Pascale Cotto MD  Department of Hospital Medicine   [facility] - Intensive Care ([facility]

## 2023-06-27 NOTE — PT/OT/SLP PROGRESS
Occupational Therapy   Treatment    Name: Cornelio Rivers  MRN: [id number]  Admitting Diagnosis:  Decompensated hepatic cirrhosis       Recommendations:     Discharge Recommendations: nursing facility, skilled  Discharge Equipment Recommendations:  hospital bed, lift device, wheelchair  Barriers to discharge:       Assessment:     Cornelio Rivers is a 58 y.o. male with a medical diagnosis of Decompensated hepatic cirrhosis.  He presents with the following performance deficits affecting function: weakness, impaired endurance, impaired functional mobility, impaired self care skills, gait instability, impaired balance, decreased upper extremity function, decreased lower extremity function, abnormal tone.     Pt agreeable to therapy and tolerated session well. Pt alert to voice commands but unable to respond/follow commands. He requires significant assistance for bed mobility. Pt displaying extensor tone on RUE.     Rehab Prognosis:  Good; patient would benefit from acute skilled OT services to address these deficits and reach maximum level of function.       Plan:     Patient to be seen 3 x/week to address the above listed problems via self-care/home management, therapeutic activities, therapeutic exercises, neuromuscular re-education  Plan of Care Expires: 07/08/23  Plan of Care Reviewed with: patient, spouse    Subjective     Chief Complaint: n/a  Pain/Comfort:  Pain Rating 1: 0/10  Pain Rating Post-Intervention 1: 0/10    Objective:     Communicated with: RN prior to session.  Patient found HOB elevated with PureWick, telemetry, oxygen (waffle mattress, foam boots) upon OT entry to room.  Treatment supervised by VLAD Sharma.    General Precautions: Standard, aspiration, fall    Orthopedic Precautions:N/A  Braces: N/A  Respiratory Status: Nasal cannula, flow 2 L/min     Occupational Performance:     Bed Mobility:    Patient completed Rolling/Turning to Left with  maximal assistance  Patient completed  Scooting/Bridging with total assistance and 2 persons  Patient completed Supine to Sit with total assistance and 2 persons  Patient completed Sit to Supine with total assistance and 2 persons     Functional Mobility/Transfers:  N/p      AMPAC 6 Click ADL: 6    Treatment & Education:  Seated EOB, pt completed BUE therex PROM (f01aghr each)  - Straight Arm Raises  - Bicep Curls    Weightbearing through upper extremity to decrease tone.    Pt and wife educated on OT POC and frequency during hospital stay.     Addressed all patient questions/concerns within [facility] scope of practice.    Patient left HOB elevated with all lines intact, call button in reach, and RN notified    GOALS:   Multidisciplinary Problems       Occupational Therapy Goals          Problem: Occupational Therapy    Goal Priority Disciplines Outcome Interventions   Occupational Therapy Goal     OT, PT/OT Ongoing, Progressing    Description: Goals to be met by: 7/8/23     Patient will increase functional independence with ADLs by performing:    Grooming while seated with Moderate Assistance.  Sitting at edge of bed x5 minutes with Moderate Assistance.  Rolling to Right, Left with Moderate Assistance.   Supine to sit with Maximum Assistance.  Upper extremity exercise program x10 reps per handout, with assistance as needed.                         Time Tracking:     OT Date of Treatment: 06/27/23  OT Start Time: 1430  OT Stop Time: 1455  OT Total Time (min): 25 min    Billable Minutes:Therapeutic Activity 25    OT/MARIELENA: MARIELENA     Number of MARIELENA visits since last OT visit: 1 6/27/2023

## 2023-06-27 NOTE — NURSING
Pt unable to follow simple commands. Lactulose given. Pt continues to have large bowel movements. Wife at the bedside. Will continue to monitor.

## 2023-06-27 NOTE — PLAN OF CARE
Patient A&OX0, take PO intake without any complication. Did not have any BM this shift and q2hr turned and no sign of bleeding noted this shift   Problem: Adult Inpatient Plan of Care  Goal: Plan of Care Review  Outcome: Ongoing, Progressing  Goal: Patient-Specific Goal (Individualized)  Outcome: Ongoing, Progressing  Goal: Absence of Hospital-Acquired Illness or Injury  Outcome: Ongoing, Progressing  Goal: Optimal Comfort and Wellbeing  Outcome: Ongoing, Progressing  Goal: Readiness for Transition of Care  Outcome: Ongoing, Progressing     Problem: Fall Injury Risk  Goal: Absence of Fall and Fall-Related Injury  Outcome: Ongoing, Progressing     Problem: Gas Exchange Impaired  Goal: Optimal Gas Exchange  Outcome: Ongoing, Progressing     Problem: Skin Injury Risk Increased  Goal: Skin Health and Integrity  Outcome: Ongoing, Progressing     Problem: Impaired Wound Healing  Goal: Optimal Wound Healing  Outcome: Ongoing, Progressing     Problem: Coping Ineffective  Goal: Effective Coping  Outcome: Ongoing, Progressing

## 2023-06-27 NOTE — PLAN OF CARE
Pt lethargic, arouses to physical stimulation. Pt continued on lactulose. Pt has had 3 large bowel movements.

## 2023-06-27 NOTE — ASSESSMENT & PLAN NOTE
Platelet 86 6/27  - due to portal hypertension and splenic sequestration   - monitor trend   - transfuse for hgb <10k or <50k with active bleed

## 2023-06-27 NOTE — ASSESSMENT & PLAN NOTE
Patient currently DNR.  If patient's mental status does not improve by 06/28, patient's wife would like for him to return home with her under hospice services   - Reconfirmed patient's wife's wishes since patient had decline in mental status on 06/27, if mentation does not improve by 06/28, likely will dc to hospice.

## 2023-06-27 NOTE — CARE UPDATE
"RAPID RESPONSE NURSE CHART REVIEW       Chart Reviewed: 06/27/2023, 10:31 AM    MRN: [id number]  Bed: 95332/65600 A    Dx: Decompensated hepatic cirrhosis    Cornelio Rivers has a past medical history of Alcoholic cirrhosis of liver with ascites, Ascites, Coagulopathy, Esophageal varices with bleeding, Hepatic encephalopathy, Mixed hyperlipidemia, Portal hypertension, and Thrombocytopenia, unspecified.    Last VS: BP (!) 96/57 (BP Location: Right arm, Patient Position: Lying)   Pulse 92   Temp 97.8 °F (36.6 °C) (Axillary)   Resp 16   Ht 5' 10" (1.778 m)   Wt 85.7 kg (188 lb 15 oz)   SpO2 100%   BMI 27.11 kg/m²     24H Vital Sign Range:  Temp:  [97.5 °F (36.4 °C)-98.2 °F (36.8 °C)]   Pulse:  [82-93]   Resp:  [16-18]   BP: ()/(57-66)   SpO2:  [94 %-100 %]     Level of Consciousness (AVPU): responds to voice    Recent Labs     06/26/23  0357 06/27/23  0441   WBC 6.15 6.67   HGB 7.0* 6.7*   HCT 20.8* 19.8*   PLT 75* 86*       Recent Labs     06/25/23  0711 06/26/23  0357 06/27/23  0441   * 131* 132*   K 3.6 3.8 3.8   CL 89* 91* 93*   CO2 32* 30* 30*   CREATININE 0.6 0.6 0.7    113* 122*   MG 1.6 1.6 1.6        No results for input(s): PH, PCO2, PO2, HCO3, POCSATURATED, BE in the last 72 hours.     OXYGEN:  Flow (L/min): 1.5  Oxygen Concentration (%): 93       MEWS score: 3    Charge RN, [place]  contacted. No concerns verbalized at this time. Instructed to call [phone]   or further concerns or assistance.    Aleshia Wilson RN        "

## 2023-06-27 NOTE — PLAN OF CARE
Pt OX1. Pt nonverbal, but able to follow simple commands at times. PT continued on PO lactulose. Large BM noted. Will continue to monitor.

## 2023-06-28 VITALS
SYSTOLIC BLOOD PRESSURE: 97 MMHG | HEIGHT: 70 IN | BODY MASS INDEX: 27.05 KG/M2 | TEMPERATURE: 99 F | RESPIRATION RATE: 19 BRPM | OXYGEN SATURATION: 98 % | HEART RATE: 98 BPM | WEIGHT: 188.94 LBS | DIASTOLIC BLOOD PRESSURE: 63 MMHG

## 2023-06-28 LAB
A1AT PHENOTYP SERPL-IMP: ABNORMAL BANDS
A1AT PHENOTYP SERPL-IMP: ABNORMAL BANDS
A1AT SERPL NEPH-MCNC: 64 MG/DL (ref 100–190)
A1AT SERPL NEPH-MCNC: 69 MG/DL (ref 100–190)
ALBUMIN SERPL BCP-MCNC: 3.3 G/DL (ref 3.5–5.2)
ALP SERPL-CCNC: 132 U/L (ref 55–135)
ALT SERPL W/O P-5'-P-CCNC: 34 U/L (ref 10–44)
ANION GAP SERPL CALC-SCNC: 8 MMOL/L (ref 8–16)
AST SERPL-CCNC: 55 U/L (ref 10–40)
BILIRUB SERPL-MCNC: 11.8 MG/DL (ref 0.1–1)
BUN SERPL-MCNC: 8 MG/DL (ref 6–20)
CALCIUM SERPL-MCNC: 9.4 MG/DL (ref 8.7–10.5)
CHLORIDE SERPL-SCNC: 93 MMOL/L (ref 95–110)
CO2 SERPL-SCNC: 31 MMOL/L (ref 23–29)
CREAT SERPL-MCNC: 0.7 MG/DL (ref 0.5–1.4)
ERYTHROCYTE [DISTWIDTH] IN BLOOD BY AUTOMATED COUNT: 21.3 % (ref 11.5–14.5)
EST. GFR  (NO RACE VARIABLE): >60 ML/MIN/1.73 M^2
GLUCOSE SERPL-MCNC: 151 MG/DL (ref 70–110)
HCT VFR BLD AUTO: 21.1 % (ref 40–54)
HGB BLD-MCNC: 6.8 G/DL (ref 14–18)
INR PPP: 2.1 (ref 0.8–1.2)
MAGNESIUM SERPL-MCNC: 1.6 MG/DL (ref 1.6–2.6)
MCH RBC QN AUTO: 37.2 PG (ref 27–31)
MCHC RBC AUTO-ENTMCNC: 32.2 G/DL (ref 32–36)
MCV RBC AUTO: 115 FL (ref 82–98)
PLATELET # BLD AUTO: 88 K/UL (ref 150–450)
PMV BLD AUTO: 9.9 FL (ref 9.2–12.9)
POTASSIUM SERPL-SCNC: 3.4 MMOL/L (ref 3.5–5.1)
PROT SERPL-MCNC: 5.8 G/DL (ref 6–8.4)
PROTHROMBIN TIME: 21.9 SEC (ref 9–12.5)
RBC # BLD AUTO: 1.83 M/UL (ref 4.6–6.2)
SODIUM SERPL-SCNC: 132 MMOL/L (ref 136–145)
WBC # BLD AUTO: 7.71 K/UL (ref 3.9–12.7)

## 2023-06-28 PROCEDURE — 63600175 PHARM REV CODE 636 W HCPCS: Mod: NTX | Performed by: STUDENT IN AN ORGANIZED HEALTH CARE EDUCATION/TRAINING PROGRAM

## 2023-06-28 PROCEDURE — 83735 ASSAY OF MAGNESIUM: CPT | Mod: NTX | Performed by: STUDENT IN AN ORGANIZED HEALTH CARE EDUCATION/TRAINING PROGRAM

## 2023-06-28 PROCEDURE — 85610 PROTHROMBIN TIME: CPT | Mod: NTX | Performed by: HOSPITALIST

## 2023-06-28 PROCEDURE — 25000003 PHARM REV CODE 250: Mod: NTX | Performed by: HOSPITALIST

## 2023-06-28 PROCEDURE — 85027 COMPLETE CBC AUTOMATED: CPT | Mod: NTX | Performed by: STUDENT IN AN ORGANIZED HEALTH CARE EDUCATION/TRAINING PROGRAM

## 2023-06-28 PROCEDURE — 25000003 PHARM REV CODE 250: Mod: NTX | Performed by: STUDENT IN AN ORGANIZED HEALTH CARE EDUCATION/TRAINING PROGRAM

## 2023-06-28 PROCEDURE — 97110 THERAPEUTIC EXERCISES: CPT | Mod: NTX,CQ

## 2023-06-28 PROCEDURE — 99239 HOSP IP/OBS DSCHRG MGMT >30: CPT | Mod: NTX,,, | Performed by: STUDENT IN AN ORGANIZED HEALTH CARE EDUCATION/TRAINING PROGRAM

## 2023-06-28 PROCEDURE — C9113 INJ PANTOPRAZOLE SODIUM, VIA: HCPCS | Mod: NTX | Performed by: STUDENT IN AN ORGANIZED HEALTH CARE EDUCATION/TRAINING PROGRAM

## 2023-06-28 PROCEDURE — 99239 PR HOSPITAL DISCHARGE DAY,>30 MIN: ICD-10-PCS | Mod: NTX,,, | Performed by: STUDENT IN AN ORGANIZED HEALTH CARE EDUCATION/TRAINING PROGRAM

## 2023-06-28 PROCEDURE — 36415 COLL VENOUS BLD VENIPUNCTURE: CPT | Mod: NTX | Performed by: HOSPITALIST

## 2023-06-28 PROCEDURE — 97530 THERAPEUTIC ACTIVITIES: CPT | Mod: NTX,CQ

## 2023-06-28 PROCEDURE — 80053 COMPREHEN METABOLIC PANEL: CPT | Mod: NTX | Performed by: HOSPITALIST

## 2023-06-28 RX ORDER — MIDODRINE HYDROCHLORIDE 5 MG/1
15 TABLET ORAL
Qty: 270 TABLET | Refills: 11
Start: 2023-06-28 | End: 2023-06-28 | Stop reason: SDUPTHER

## 2023-06-28 RX ORDER — LANOLIN ALCOHOL/MO/W.PET/CERES
100 CREAM (GRAM) TOPICAL DAILY
Qty: 30 TABLET | Refills: 2 | Status: SHIPPED | OUTPATIENT
Start: 2023-06-29 | End: 2023-09-27

## 2023-06-28 RX ORDER — SPIRONOLACTONE 100 MG/1
100 TABLET, FILM COATED ORAL DAILY
Start: 2023-06-28 | End: 2023-06-28 | Stop reason: SDUPTHER

## 2023-06-28 RX ORDER — ZINC SULFATE 50(220)MG
220 CAPSULE ORAL DAILY
Qty: 30 CAPSULE | Refills: 2 | Status: SHIPPED | OUTPATIENT
Start: 2023-06-29 | End: 2023-09-27

## 2023-06-28 RX ORDER — MIDODRINE HYDROCHLORIDE 5 MG/1
15 TABLET ORAL
Qty: 90 TABLET | Refills: 11 | Status: SHIPPED | OUTPATIENT
Start: 2023-06-28 | End: 2024-06-27

## 2023-06-28 RX ORDER — FUROSEMIDE 40 MG/1
40 TABLET ORAL DAILY PRN
Qty: 60 TABLET | Refills: 0 | Status: SHIPPED | OUTPATIENT
Start: 2023-06-28 | End: 2023-08-27

## 2023-06-28 RX ORDER — RIFAXIMIN 550 MG/1
550 TABLET ORAL 2 TIMES DAILY
Qty: 60 TABLET | Refills: 2 | Status: SHIPPED | OUTPATIENT
Start: 2023-06-28 | End: 2023-09-26

## 2023-06-28 RX ORDER — FUROSEMIDE 40 MG/1
40 TABLET ORAL DAILY PRN
Start: 2023-06-28 | End: 2023-06-28 | Stop reason: SDUPTHER

## 2023-06-28 RX ORDER — LACTULOSE 10 G/15ML
30 SOLUTION ORAL; RECTAL EVERY 6 HOURS
Qty: 5400 ML | Refills: 2 | Status: SHIPPED | OUTPATIENT
Start: 2023-06-28 | End: 2023-09-26

## 2023-06-28 RX ORDER — SPIRONOLACTONE 100 MG/1
100 TABLET, FILM COATED ORAL DAILY
Qty: 30 TABLET | Refills: 2 | Status: SHIPPED | OUTPATIENT
Start: 2023-06-28 | End: 2023-09-26

## 2023-06-28 RX ORDER — CIPROFLOXACIN 500 MG/1
500 TABLET ORAL DAILY
Qty: 30 TABLET | Refills: 2 | Status: SHIPPED | OUTPATIENT
Start: 2023-06-28 | End: 2023-09-26

## 2023-06-28 RX ORDER — LANOLIN ALCOHOL/MO/W.PET/CERES
100 CREAM (GRAM) TOPICAL DAILY
Start: 2023-06-29 | End: 2023-06-28 | Stop reason: SDUPTHER

## 2023-06-28 RX ORDER — ZINC SULFATE 50(220)MG
220 CAPSULE ORAL DAILY
Start: 2023-06-29 | End: 2023-06-28 | Stop reason: SDUPTHER

## 2023-06-28 RX ORDER — LACTULOSE 10 G/15ML
30 SOLUTION ORAL EVERY 6 HOURS
Start: 2023-06-28 | End: 2023-06-28 | Stop reason: SDUPTHER

## 2023-06-28 RX ADMIN — Medication 100 MG: at 09:06

## 2023-06-28 RX ADMIN — MIDODRINE HYDROCHLORIDE 15 MG: 5 TABLET ORAL at 09:06

## 2023-06-28 RX ADMIN — POLYETHYLENE GLYCOL 3350 17 G: 17 POWDER, FOR SOLUTION ORAL at 09:06

## 2023-06-28 RX ADMIN — ZINC SULFATE 220 MG (50 MG) CAPSULE 220 MG: CAPSULE at 09:06

## 2023-06-28 RX ADMIN — MIDODRINE HYDROCHLORIDE 15 MG: 5 TABLET ORAL at 12:06

## 2023-06-28 RX ADMIN — LACTULOSE 30 G: 20 SOLUTION ORAL at 12:06

## 2023-06-28 RX ADMIN — PANTOPRAZOLE SODIUM 40 MG: 40 INJECTION, POWDER, FOR SOLUTION INTRAVENOUS at 09:06

## 2023-06-28 RX ADMIN — BISACODYL 10 MG: 10 SUPPOSITORY RECTAL at 09:06

## 2023-06-28 RX ADMIN — LACTULOSE 30 G: 20 SOLUTION ORAL at 07:06

## 2023-06-28 RX ADMIN — POTASSIUM BICARBONATE 40 MEQ: 391 TABLET, EFFERVESCENT ORAL at 12:06

## 2023-06-28 RX ADMIN — CIPROFLOXACIN 500 MG: 250 TABLET, COATED ORAL at 09:06

## 2023-06-28 RX ADMIN — RIFAXIMIN 550 MG: 550 TABLET ORAL at 09:06

## 2023-06-28 NOTE — ASSESSMENT & PLAN NOTE
Waxing and waning mental status. Patient being treated for HE but still persistently encephalopathic. Neurology was consulted, patient placed on EEG. EEG showing slow and disorganized background with waxing and waning runs of triphasic waves consistent with a moderate-severe encephalopathy with evidence of cortical irritation diffusely which could be seen in liver disease  - Patient being treated for possible wernickes with IV thiamine  - MRI brain ordered 06/22/2023 which did not demonstrate any acute CVA however it did show there was some chronic changes in the basal ganglia which may be related to underlying liver disease however not particularly diagnostic  - on 06/24/2023 after switching to rectal lactulose patient more alert but still has myoclonic jerks when attempting to coordinate muscle movement. Gaseous distension noted on abdominal imaging which may be preventing further ammonia clearance as he has not had consistent BMs  - 06/25 switched back to oral lactulose, repeat KUB ordered which showed improvement in gaseous distension  - 06/26 abdomen softer but still tympanic  - 06/27 mentation worse, switched back to rectal lactulose  - 06/28, despite aggressive lactulose therapy, patient remains persistently encephalopathic and likely has irreversible neurological changes.

## 2023-06-28 NOTE — DISCHARGE SUMMARY
[facility] - Intensive Care ([address] Medicine  Discharge Summary      Patient Name: Cornelio Rivers  MRN: [id number]  [facility]: [id number]  Patient Class: IP- Inpatient  Admission Date: 6/15/2023  Hospital Length of Stay: 13 days  Discharge Date and Time:  06/28/2023 3:38 PM  Attending Physician: Pascale Cotto MD   Discharging Provider: Pascale Cotto MD  Primary Care Provider: [facility] Medicine Team: [facility] MED  Pascale Cotto MD  Primary Care Team: [facility]    HPI:   Cornelio Rivers is a 58-year-old male with a history of alcoholic cirrhosis diagnosed in 2020 complicated by portal hypertension, bleeding esophageal varices s/p banding in 2020, ascites, thrombocytopenia, coagulopathy, hepatic encephalopathy, SBP, alcohol use, and hyperlipidemia who presents as a transfer from [facility] for management of decompensated liver cirrhosis. Patient was admitted to [facility] on June 2 with altered mental status, increasing weakness, low blood pressure, poor appetite, and possible pneumonia.  He had ammonia level 102 and a MELD score 32 (sodium 118, INR 2.2, total bilirubin 16.3, creatinine 1.02). Chest x-ray on admission had mild interstitial edema versus pneumonitis with findings suggestive of developing airspace disease or atelectasis in the right chest.  Initially he had hypotension with concern for septic shock and was treated in the ICU with pressors.  He was treated with broad-spectrum antibiotics.  Hemoglobin decreased during his stay and he received packed red blood cells, but he did not have signs of GI bleeding.  INR increased during his stay and he received vitamin K/FFP.  He gradually weaned off pressors and was transferred out of the ICU on June 8.  Mental status has not improved, and he remains intermittently lethargic.  Bilirubin was stable to slightly improved, but INR has worsened.  He was empirically started on Rocephin for SBP prophylaxis  (no paracentesis with elevated INR//blood cultures with no growth so far).  He has persistent abdominal distention.  Current medications include Xifaxan and lactulose, Protonix, midodrine, ceftriaxone and Lasix/Aldactone.  Last alcohol use was noted to be about 2 months ago. Current MELD score 29.  Referring team spoke with Hepatology at [facility] with plan to transfer for further evaluation.   Current diagnoses include hepatic encephalopathy, alcoholic hepatitis, coagulopathy, and anemia.     June 13: Sodium 141, potassium 3.4, chloride 105 CO2 30, BUN 11, creatinine 0.51, glucose 103, total bilirubin 13.8, , ALT 75, INR 3, white blood cells 8.3, hemoglobin 8.4, hematocrit 25.5, platelets 89     June 12: Sodium 142, potassium 3, chloride 106, CO2 29, BUN 10, creatinine 0.59, glucose 125, calcium 9.5, magnesium 1.64, bilirubin 13.5, , ALT 78, white blood cells 7.4, hemoglobin 8.3, hematocrit 25.5, platelets 109, INR 2.9     June 9:  Right upper extremity Doppler venous ultrasound had no DVT  -chest x-ray had stable patchy bilateral pulmonary infiltrates.     June 2: COVID negative, influenza negative  -gallbladder ultrasound noted moderate volume ascites.  Thick-walled gallbladder seen, nonspecific.  Internal gallbladder sludge.  Chest x-ray had mild interstitial edema or pneumonitis with findings suggestive of developing airspace disease or atelectasis in the right chest.  -CT head had no evidence of acute intracranial hemorrhage.  Some microvascular disease is present.     February 16, 2023: EGD had no significant recurrence of varices in the esophagus.  Moderate portal hypertensive gastropathy with friable mucosa.    During my interview upon arrival to [facility], patient with waxing and waning mental status. He open eyes upon calling name, speaks few words but then falls back to sleep. He is oriented to person only as he was able to tell his name and his wife's name correctly who is present at  bedside. He is able to follow simple commands like open his mouth upon asking. Wife states patient had a long history of alcohol use prior to 2020 when he developed acute esophageal variceal bleeding and diagnosed with alcoholic liver cirrhosis. He has been following with local GI doctor Dr. Gallegos in [place] for cirrhosis. His last hospital admission was in January of this year when he was admitted with SBP and had 5 liter paracentesis. He was discharged home on course of prednisone and ciprofloxacin at that time. Wife states patient cut down alcohol since 2020 and his last alcohol use about 2-3 months ago. Wife noticed overall declining about 2 weeks prior to this hospital admission as he was getting more weak, confused, lethargy, losing weight and muscle mass, decreased appetite. On June 2nd wife noticed his blood pressure to be low in 70s when she drove him to [facility]. Denies tobacco, IVDU or other illicit drug use. He worked as a .          * No surgery found *      Hospital Course:   For patient presented at outside hospital transfer for transplant evaluation.  Transplant evaluation currently not an option given patient's persistent encephalopathy and inability to conduct the interview.  Patient's persistent encephalopathy without unclear etiology.  Patient is maximized on his lactulose dosing, he has been on and continuous EEG that any identifiable is seizure activity.  EEG demonstrating slow and disorganized background with waxing and waning runs of triphasic waves consistent with a moderate-severe encephalopathy with evidence of cortical irritation diffusely.  Given  lack of improvement, palliative consulted.  Patient's wife states that she would like for him to return home with her if all options are exhausted. We attempted maximal lactulose therapy for HE but unfortunately patient's symptoms were refractory. He had some periods where he was lucid enough to give one word  answers or occasionally look around the room but would then regress again. Patient discharged to home with hospice and wife.        Goals of Care Treatment Preferences:  Code Status: DNR          What is most important right now is to focus on comfort and QOL , spending time with family.  Accordingly, we have decided that the best plan to meet the patient's goals includes continuing with treatment, pivot to comfort-focused care.      Consults:   Consults (From admission, onward)        Status Ordering Provider     [facility]  Once        Provider:  (Not yet assigned)    Completed DENICE DE LEÓN     Inpatient consult to Neurology  Once        Provider:  (Not yet assigned)    Completed DENICE DE LEÓN     Inpatient consult to Palliative Care  Once        Provider:  (Not yet assigned)    Completed DENICE DE LEÓN     Inpatient consult to Spiritual Care  Once        Provider:  (Not yet assigned)    Completed DENICE DE LEÓN     Inpatient consult to Registered Dietitian/Nutritionist  Once        Provider:  (Not yet assigned)    Completed MARLENE JACOB     IP consult to case management  Once        Provider:  (Not yet assigned)    Completed DENICE DE LEÓN     Inpatient consult to Hepatology  Once        Provider:  (Not yet assigned)    Completed MARLENE JACOB          Neuro  Encephalopathy, metabolic  Waxing and waning mental status. Patient being treated for HE but still persistently encephalopathic. Neurology was consulted, patient placed on EEG. EEG showing slow and disorganized background with waxing and waning runs of triphasic waves consistent with a moderate-severe encephalopathy with evidence of cortical irritation diffusely which could be seen in liver disease  - Patient being treated for possible wernickes with IV thiamine  - MRI brain ordered 06/22/2023 which did not demonstrate any acute CVA however it did show there was some chronic changes in the basal ganglia which may  be related to underlying liver disease however not particularly diagnostic  - on 06/24/2023 after switching to rectal lactulose patient more alert but still has myoclonic jerks when attempting to coordinate muscle movement. Gaseous distension noted on abdominal imaging which may be preventing further ammonia clearance as he has not had consistent BMs  - 06/25 switched back to oral lactulose, repeat KUB ordered which showed improvement in gaseous distension  - 06/26 abdomen softer but still tympanic  - 06/27 mentation worse, switched back to rectal lactulose  - 06/28, despite aggressive lactulose therapy, patient remains persistently encephalopathic and likely has irreversible neurological changes.     GI  * Decompensated hepatic cirrhosis  Alcoholic liver cirrhosis with ascites   Portal hypertension   Esophageal varices w/o bleeding   Hepatic encephalopathy   Coagulopathy   Thrombocytopenia   MELD-Na: 27 at 6/28/2023  4:03 AM  MELD: 24 at 6/28/2023  4:03 AM  Calculated from:  Serum Creatinine: 0.7 mg/dL (Using min of 1 mg/dL) at 6/28/2023  4:03 AM  Serum Sodium: 132 mmol/L at 6/28/2023  4:03 AM  Total Bilirubin: 11.8 mg/dL at 6/28/2023  4:03 AM  INR(ratio): 2.1 at 6/28/2023  4:03 AM  No signs of active bleeding or overt sepsis. Paracentesis on 6/16 no SBP   - continue lactulose and rifaximin for hepatic encephalopathy  - diuretics prn on discharge w/ hospice  - continue midodrine for borderline low BP   - continue cipro for sbp ppx          Palliative Care  Palliative care encounter  Patient currently DNR.  If patient's mental status does not improve by 06/28, patient's wife would like for him to return home with her under hospice services   - Patient dc'ed home with wife/hospice        Final Active Diagnoses:    Diagnosis Date Noted POA    PRINCIPAL PROBLEM:  Decompensated hepatic cirrhosis [K72.90, K74.60] 06/15/2023 Yes    Hepatic encephalopathy [K76.82] 06/15/2023 Yes    Encephalopathy, metabolic [G93.41]  06/15/2023 Yes    Hyponatremia [E87.1] 06/22/2023 Yes    Moderate malnutrition [E44.0] 06/21/2023 Yes    Palliative care encounter [Z51.5] 06/20/2023 Not Applicable    Alcoholic cirrhosis of liver with ascites [K70.31] 06/15/2023 Yes    Portal hypertension [K76.6] 06/15/2023 Yes    Secondary esophageal varices without bleeding [I85.10] 06/15/2023 Yes    Thrombocytopenia [D69.6] 06/15/2023 Yes    Coagulopathy [D68.9] 06/15/2023 Yes    Physical debility [R53.81] 06/15/2023 Yes      Problems Resolved During this Admission:    Diagnosis Date Noted Date Resolved POA    Encephalopathy [G93.40] 06/20/2023 06/22/2023 Yes    Hypokalemia [E87.6] 06/15/2023 06/27/2023 Yes    Hypomagnesemia [E83.42] 06/15/2023 06/27/2023 Yes       Discharged Condition: poor    Disposition: Hospice/Home    Follow Up:    Patient Instructions:   No discharge procedures on file.    Significant Diagnostic Studies: Labs:   CMP   Recent Labs   Lab 06/27/23  0441 06/28/23 0403   * 132*   K 3.8 3.4*   CL 93* 93*   CO2 30* 31*   * 151*   BUN 7 8   CREATININE 0.7 0.7   CALCIUM 9.5 9.4   PROT 5.6* 5.8*   ALBUMIN 3.3* 3.3*   BILITOT 12.3* 11.8*   ALKPHOS 99 132   AST 54* 55*   ALT 34 34   ANIONGAP 9 8   , CBC   Recent Labs   Lab 06/27/23 0441 06/28/23 0403   WBC 6.67 7.71   HGB 6.7* 6.8*   HCT 19.8* 21.1*   PLT 86* 88*    and INR   Lab Results   Component Value Date    INR 2.1 (H) 06/28/2023    INR 2.3 (H) 06/27/2023    INR 2.2 (H) 06/26/2023     MELD-Na: 27 at 6/28/2023  4:03 AM  MELD: 24 at 6/28/2023  4:03 AM  Calculated from:  Serum Creatinine: 0.7 mg/dL (Using min of 1 mg/dL) at 6/28/2023  4:03 AM  Serum Sodium: 132 mmol/L at 6/28/2023  4:03 AM  Total Bilirubin: 11.8 mg/dL at 6/28/2023  4:03 AM  INR(ratio): 2.1 at 6/28/2023  4:03 AM      Radiology: MRI: Brain 6/23  Impression:     No definite acute intracranial process with no intracranial hemorrhage restricted diffusion or enhancing lesion.     There is subtle increased  signal within the basal ganglia and sahara that is thought more likely to reflect  chronic ischemic or metabolic insult however if a more acute process is suspected follow-up could be performed as clinically warranted.    Pending Diagnostic Studies:     None         Medications:  Reconciled Home Medications:      Medication List      START taking these medications    midodrine 5 MG Tab  Commonly known as: PROAMATINE  Take 3 tablets (15 mg total) by mouth 3 (three) times daily with meals.     thiamine 100 MG tablet  Take 1 tablet (100 mg total) by mouth once daily.  Start taking on: June 29, 2023     zinc sulfate 50 mg zinc (220 mg) capsule  Commonly known as: ZINCATE  Take 1 capsule (220 mg total) by mouth once daily.  Start taking on: June 29, 2023        CHANGE how you take these medications    furosemide 40 MG tablet  Commonly known as: LASIX  Take 1 tablet (40 mg total) by mouth daily as needed (ascites).  What changed:   · when to take this  · reasons to take this     lactulose 10 gram/15 mL solution  Commonly known as: CHRONULAC  Take 45 mLs (30 g total) by mouth every 6 (six) hours. Titrate for 3-4 bowel movements per day.  What changed:   · medication strength  · See the new instructions.        CONTINUE taking these medications    ciprofloxacin HCl 500 MG tablet  Commonly known as: CIPRO  Take 1 tablet (500 mg total) by mouth Daily.     spironolactone 100 MG tablet  Commonly known as: ALDACTONE  Take 1 tablet (100 mg total) by mouth once daily.     XIFAXAN 550 mg Tab  Generic drug: rifAXIMin  Take 1 tablet (550 mg total) by mouth 2 (two) times daily.        STOP taking these medications    meclizine 25 mg tablet  Commonly known as: ANTIVERT     mirtazapine 7.5 MG Tab  Commonly known as: REMERON     nadoloL 40 MG tablet  Commonly known as: CORGARD     pantoprazole 40 MG tablet  Commonly known as: PROTONIX            Indwelling Lines/Drains at time of discharge:   Lines/Drains/Airways     Drain  Duration            Male External Urinary Catheter 06/15/23 0110 Small 13 days                Time spent on the discharge of patient: 40 minutes         Pascale Cotto MD  Department of Hospital Medicine  [facility] - Intensive Care ([place])

## 2023-06-28 NOTE — PROGRESS NOTES
Pt IV's taken out and discharge papers given.  Pt going home via ambulance van and spouse is at bedside.  Pt going home with home hospice.

## 2023-06-28 NOTE — PLAN OF CARE
06/28/23 0845   Post-Acute Status   Post-Acute Authorization Hospice   Hospice Status Set-up Complete/Auth obtained   Discharge Plan   Discharge Plan A Hospice/home     [facility] Phone: [phone] is willing to accept.    0936 am  TIFFANY spoke with Alexia @ [facility]  # [phone] and has spoken with the patients wife, Rogers JONES, and will order home equipment and once I receive an ETA of HME delivery will arrange for transportation.      11:22 AM  Hospice orders sent via [facility] and CM called and spoke to Alexia and [employer] company will call the wife to schedule delivery time.    1:08 pm  Spoke to Alexia and HME is being delivered and transportation arranged for 2:30 pm . Patients wife roxi aware and assigned nurse.    3:33 PM  [facility] is here for a  and patient is transporting to [address] [place] [address].  CM called and spoke to [facility] to inform of patients transport to home.        Dilia Aguirre RN  Case Management  [facility]  [phone]

## 2023-06-28 NOTE — PLAN OF CARE
[facility]  Department of Hospital Medicine  [address] [place] [address]  [phone] after hours  [phone] fax    HOSPICE  ORDERS    06/28/2023    Admit to Hospice:  Home Service     Diagnoses:   Active Hospital Problems    Diagnosis  POA    *Decompensated hepatic cirrhosis [K72.90, K74.60]  Yes    Hepatic encephalopathy [K76.82]  Yes     Priority: 1 - High    Encephalopathy, metabolic [G93.41]  Yes     Priority: 1 - High    Hyponatremia [E87.1]  Yes    Moderate malnutrition [E44.0]  Yes    Palliative care encounter [Z51.5]  Not Applicable    Alcoholic cirrhosis of liver with ascites [K70.31]  Yes    Portal hypertension [K76.6]  Yes    Secondary esophageal varices without bleeding [I85.10]  Yes    Thrombocytopenia [D69.6]  Yes    Coagulopathy [D68.9]  Yes    Physical debility [R53.81]  Yes      Resolved Hospital Problems    Diagnosis Date Resolved POA    Encephalopathy [G93.40] 06/22/2023 Yes    Hypokalemia [E87.6] 06/27/2023 Yes    Hypomagnesemia [E83.42] 06/27/2023 Yes       Hospice Qualifying Diagnoses:        Patient has a life expectancy < 6 months due to:  Primary Hospice Diagnosis:  Hepatic encephalopathy  Comorbid Conditions Contributing to Decline:  Cirrhosis    Vital Signs: Routine per Hospice Protocol.    Code Status: DNR    Allergies: Review of patient's allergies indicates:  No Known Allergies    Diet: Mechanical soft    Activities: As tolerated    Goals of Care Treatment Preferences:  Code Status: DNR          What is most important right now is to focus on comfort and QOL and being around family in a familiar environment.  Accordingly, we have decided that the best plan to meet the patient's goals includes continuing with treatment, pivot to comfort-focused care.      Nursing: Per Hospice Routine.      Routine Skin for Bedridden Patients: Apply moisture barrier cream to all skin folds and   wet areas in perineal area daily and after baths and all  bowel movements.        Oxygen: 2L NC    Other Miscellaneous Care:   Medications:          Medication List        START taking these medications      midodrine 5 MG Tab  Commonly known as: PROAMATINE  Take 3 tablets (15 mg total) by mouth 3 (three) times daily with meals.     thiamine 100 MG tablet  Take 1 tablet (100 mg total) by mouth once daily.  Start taking on: June 29, 2023     zinc sulfate 50 mg zinc (220 mg) capsule  Commonly known as: ZINCATE  Take 1 capsule (220 mg total) by mouth once daily.  Start taking on: June 29, 2023            CHANGE how you take these medications      furosemide 40 MG tablet  Commonly known as: LASIX  Take 1 tablet (40 mg total) by mouth daily as needed (ascites).  What changed:   when to take this  reasons to take this     lactulose 20 gram/30 mL Soln  Commonly known as: CHRONULAC  Take 45 mLs (30 g total) by mouth every 6 (six) hours. Titrate for 3-4 bowel movements per day.  What changed: See the new instructions.            CONTINUE taking these medications      ciprofloxacin HCl 500 MG tablet  Commonly known as: CIPRO  Take 500 mg by mouth Daily.     spironolactone 100 MG tablet  Commonly known as: ALDACTONE  Take 1 tablet (100 mg total) by mouth once daily.     XIFAXAN 550 mg Tab  Generic drug: rifAXIMin  Take 550 mg by mouth 2 (two) times daily.            STOP taking these medications      meclizine 25 mg tablet  Commonly known as: ANTIVERT     mirtazapine 7.5 MG Tab  Commonly known as: REMERON     nadoloL 40 MG tablet  Commonly known as: CORGARD     pantoprazole 40 MG tablet  Commonly known as: PROTONIX                    Future Orders:  Hospice Medical Director may dictate new orders for comfortable care measures & sign death certificate.        _________________________________  Pascale Cotto MD  06/28/2023

## 2023-06-28 NOTE — NURSING
Patient's wife was concerned of the frequent bowel movements, and the lactlose doses he had scheduled for this shift. I told her I could page the med team and see about holding the next dose. He had a large bowel movement this shift and 3 large loose stools on day shift. I offered a rectal tube and has been refused.

## 2023-06-28 NOTE — PROGRESS NOTES
[facility] - Intensive Care ([facility]  Adult Nutrition  Progress Note    SUMMARY     Reason for Assessment    Reason For Assessment: RD follow-up  Diagnosis: liver disease  Relevant Medical History: Alcoholic cirrhosis of liver with ascites and portal HTN, HLD  Interdisciplinary Rounds: did not attend    General Information Comments: Pt pending discharge to hospice today - poor PO intake continues; ONS ordered. If further nutrition intervention warranted, please re-consult.  Nutrition Discharge Planning: Adequate nutrition    Nutrition Follow-Up    RD Follow-up?: No

## 2023-06-28 NOTE — PLAN OF CARE
Problem: Adult Inpatient Plan of Care  Goal: Plan of Care Review  Outcome: Ongoing, Not Progressing  Goal: Patient-Specific Goal (Individualized)  Outcome: Ongoing, Not Progressing  Goal: Absence of Hospital-Acquired Illness or Injury  Outcome: Ongoing, Not Progressing  Goal: Optimal Comfort and Wellbeing  Outcome: Ongoing, Not Progressing  Goal: Readiness for Transition of Care  Outcome: Ongoing, Not Progressing     Problem: Fall Injury Risk  Goal: Absence of Fall and Fall-Related Injury  Outcome: Ongoing, Not Progressing     Problem: Gas Exchange Impaired  Goal: Optimal Gas Exchange  Outcome: Ongoing, Not Progressing     Problem: Skin Injury Risk Increased  Goal: Skin Health and Integrity  Outcome: Ongoing, Not Progressing     Problem: Impaired Wound Healing  Goal: Optimal Wound Healing  Outcome: Ongoing, Not Progressing     Problem: Coping Ineffective  Goal: Effective Coping  Outcome: Ongoing, Not Progressing   EOS: patient continues to have loose stools, wife at bedside. She was concerned about the multiple stools, but refused a rectal tube. Secure chat team concerning holding the lactulose dose. VSS. Patient was just alert enough he took his rifampin crushed in applesauce, he went right back to sleep after.

## 2023-06-28 NOTE — PLAN OF CARE
CM met with wife and wife informed that attending physician will evaluate mentation after medication trials to improve mentation 2nd to  Alcoholic cirrhosis of liver with ascites and Encephalopathy, metabolic will discuss hospice.  Wife informed CM she has selected [facility] Phone: [phone].    Dilia Aguirre RN  Case Management  [facility]  [phone]

## 2023-06-28 NOTE — PLAN OF CARE
06/28/23 0842   Post-Acute Status   Post-Acute Authorization Hospice   Hospice Status Referrals Sent   Discharge Delays None known at this time   Discharge Plan   Discharge Plan A Hospice/home     CM spoke with attending and wife is in agreement with home with hospice. Patients wife choice is [facility] # [phone].    Dilia Aguirre RN  Case Management  [facility]  [phone]

## 2023-06-28 NOTE — PLAN OF CARE
Problem: Adult Inpatient Plan of Care  Goal: Optimal Comfort and Wellbeing  6/28/2023 1433 by Je Barth RN  Outcome: Ongoing, Progressing  6/28/2023 1433 by Je Barth RN  Outcome: Ongoing, Not Progressing     Problem: Impaired Wound Healing  Goal: Optimal Wound Healing  6/28/2023 1433 by Je Barth RN  Outcome: Ongoing, Progressing  6/28/2023 1433 by Je Barth RN  Outcome: Ongoing, Not Progressing

## 2023-06-28 NOTE — ASSESSMENT & PLAN NOTE
Alcoholic liver cirrhosis with ascites   Portal hypertension   Esophageal varices w/o bleeding   Hepatic encephalopathy   Coagulopathy   Thrombocytopenia   MELD-Na: 27 at 6/28/2023  4:03 AM  MELD: 24 at 6/28/2023  4:03 AM  Calculated from:  Serum Creatinine: 0.7 mg/dL (Using min of 1 mg/dL) at 6/28/2023  4:03 AM  Serum Sodium: 132 mmol/L at 6/28/2023  4:03 AM  Total Bilirubin: 11.8 mg/dL at 6/28/2023  4:03 AM  INR(ratio): 2.1 at 6/28/2023  4:03 AM  No signs of active bleeding or overt sepsis. Paracentesis on 6/16 no SBP   - continue lactulose and rifaximin for hepatic encephalopathy  - diuretics prn on discharge w/ hospice  - continue midodrine for borderline low BP   - continue cipro for sbp ppx

## 2023-06-28 NOTE — PT/OT/SLP PROGRESS
Physical Therapy Treatment    Patient Name:  Cornelio Rivers   MRN:  [id number]    Recommendations:     Discharge Recommendations: nursing facility, skilled  Discharge Equipment Recommendations: hospital bed, lift device, wheelchair  Barriers to discharge:  Decreased caregiver support Pt requiring increased skilled assistance at current time.     Assessment:     Cornelio Rivers is a 58 y.o. male admitted with a medical diagnosis of Decompensated hepatic cirrhosis.  He presents with the following impairments/functional limitations: weakness, impaired endurance, impaired self care skills, impaired functional mobility, gait instability, impaired balance, impaired cognition, decreased coordination, decreased upper extremity function, decreased lower extremity function, decreased safety awareness, impaired cardiopulmonary response to activity, abnormal tone, impaired fine motor.    Pt was non-verbal and kept his eyes closed most of treatment time, however, pt was able to knock or shake his head lightly to respond to Yes/No questions. Pt required increased assistance for Bed Mobility 2* weakness, lethargic, impaired cognition, decreased postural control. Pt with multiple functional deficits and will cont to benefit from Skilled therapy at SNF with multidisciplinary approach to reach goals.     Rehab Prognosis: Fair; patient would benefit from acute skilled PT services to address these deficits and reach maximum level of function.    Recent Surgery: * No surgery found *      Plan:     During this hospitalization, patient to be seen 3 x/week to address the identified rehab impairments via gait training, therapeutic activities, therapeutic exercises, neuromuscular re-education and progress toward the following goals:    Plan of Care Expires:  07/15/23    Subjective     Chief Complaint: n/a  Pain/Comfort:  Pain Rating 1: 0/10  Pain Rating Post-Intervention 1: 0/10      Objective:     Communicated with nurse Wooten prior to session.   Patient found HOB elevated with telemetry, PureWick, oxygen (waffle mattress, foam boots), Spouse present upon PT entry to room.     General Precautions: Standard, fall, aspiration  Orthopedic Precautions: N/A  Braces: N/A  Respiratory Status: Nasal cannula, flow 1.5 L/min SpO2 93-95% throughout treatment     Functional Mobility:  Bed Mobility:     Rolling Left:  maximal assistance  Rolling Right: maximal assistance  Scooting: anterior scoot to EOB with maximal assistance and of 2 persons via drawsheet  Bridging: toward HOB with dependence and of 2 persons via drawsheet  Supine to Sit: total assistance and of 2 persons for Trunk and BLE management with hOB elevated  Sit to Supine: total assistance and of 2 persons for Trunk and BLE management  Transfers:   Gait belt don prior standing activity  Sit to Stand: attempted to stand pt up from EOB, pt required total assistance and of 2 persons with hand-held assist, pt with B Knees flexing and was able to clear the bottom off bed but not achieve a full stand  Balance: Poor Sitting, pt with flexed trunk + rounded shoulders and posterior + L side leaning, required Max>>CGA for balance support, head + trunk control. Pt cameron >10 min in sitting at EOB      AM-PAC 6 CLICK MOBILITY  Turning over in bed (including adjusting bedclothes, sheets and blankets)?: 2  Sitting down on and standing up from a chair with arms (e.g., wheelchair, bedside commode, etc.): 1  Moving from lying on back to sitting on the side of the bed?: 2  Moving to and from a bed to a chair (including a wheelchair)?: 1  Need to walk in hospital room?: 1  Climbing 3-5 steps with a railing?: 1  Basic Mobility Total Score: 8       Treatment & Education:  Pt and Spouse were educated on call for assistance from hospital staffs with transfer or when needed; reposition pt in bed every 2 hours and BLE PROM ex for ROM/promote blood flow, Spouse verbalized understanding.    BLE ex in seated 10 reps PROM LAQ, AP    Patient  left HOB elevated with BUE offloaded by pillows, B heel foam boots, tray table near by, all lines intact, call button in reach, bed alarm on, nurse notified, and Spouse present.    GOALS:   Multidisciplinary Problems       Physical Therapy Goals          Problem: Physical Therapy    Goal Priority Disciplines Outcome Goal Variances Interventions   Physical Therapy Goal     PT, PT/OT Ongoing, Progressing     Description: Goals to be met by: 2023     Patient will increase functional independence with mobility by performin. Supine to sit with moderate assistance  2. Sit to supine with moderate assistance  3. Sit to stand transfer with maximal assistance  4. Bed to chair transfer with maximal assistance using LRAD as needed  5. Gait  x 10 feet with maximal assistance using LRAD as needed  6. Lower extremity exercise program x10 reps per handout, with supervision                        Time Tracking:     PT Received On: 23  PT Start Time: 1019     PT Stop Time: 1045  PT Total Time (min): 26 min     Billable Minutes: Therapeutic Activity 16 min  and Therapeutic Exercise 10 min    Treatment Type: Treatment  PT/PTA: PTA     Number of PTA visits since last PT visit: 2023

## 2023-06-28 NOTE — PLAN OF CARE
06/28/23 1535   Final Note   Assessment Type Final Discharge Note   Anticipated Discharge Disposition HospiceHome   What phone number can be called within the next 1-3 days to see how you are doing after discharge? [phone]   Hospital Resources/Appts/Education Provided Provided patient/caregiver with written discharge plan information   Post-Acute Status   Post-Acute Authorization Hospice   Hospice Status Set-up Complete/Auth obtained  ([facility])   Discharge Delays None known at this time     Dilia Aguirre RN  Case Management  [facility]  [phone]

## 2023-06-28 NOTE — PLAN OF CARE
[facility] - Intensive Care ([facility])  Discharge Final Note    Primary Care Provider: Primary Doctor No    Expected Discharge Date: 6/28/2023    Final Discharge Note (most recent)       Final Note - 06/28/23 1535          Final Note    Assessment Type Final Discharge Note     Anticipated Discharge Disposition Hospice/Home     What phone number can be called within the next 1-3 days to see how you are doing after discharge? [phone]     Hospital Resources/Appts/Education Provided Provided patient/caregiver with written discharge plan information        Post-Acute Status    Post-Acute Authorization Hospice     Hospice Status Set-up Complete/Auth obtained   [facility]    Discharge Delays None known at this time                     Important Message from Medicare  Important Message from Medicare regarding Discharge Appeal Rights: Explained to patient/caregiver, Signed/date by patient/caregiver     Date IMM was signed: 06/28/23  Time IMM was signed: 1254      Patient discharged home/hospice and family.       Dilia Aguirre RN  Case Management  [facility]  [phone]

## 2023-06-28 NOTE — ASSESSMENT & PLAN NOTE
Patient currently DNR.  If patient's mental status does not improve by 06/28, patient's wife would like for him to return home with her under hospice services   - Patient dc'ed home with wife/hospice